# Patient Record
Sex: MALE | Race: BLACK OR AFRICAN AMERICAN | Employment: OTHER | URBAN - METROPOLITAN AREA
[De-identification: names, ages, dates, MRNs, and addresses within clinical notes are randomized per-mention and may not be internally consistent; named-entity substitution may affect disease eponyms.]

---

## 2019-02-28 ENCOUNTER — HOSPITAL ENCOUNTER (OUTPATIENT)
Age: 64
Discharge: HOME | End: 2019-03-02
Payer: MEDICARE

## 2019-02-28 ENCOUNTER — OFFICE VISIT (OUTPATIENT)
Dept: FAMILY MEDICINE CLINIC | Age: 64
End: 2019-02-28
Payer: MEDICARE

## 2019-02-28 VITALS
DIASTOLIC BLOOD PRESSURE: 82 MMHG | SYSTOLIC BLOOD PRESSURE: 118 MMHG | HEIGHT: 69 IN | OXYGEN SATURATION: 98 % | HEART RATE: 82 BPM | WEIGHT: 207.6 LBS | BODY MASS INDEX: 30.75 KG/M2

## 2019-02-28 DIAGNOSIS — Z12.11 COLON CANCER SCREENING: ICD-10-CM

## 2019-02-28 DIAGNOSIS — M19.011 ARTHRITIS OF RIGHT ACROMIOCLAVICULAR JOINT: ICD-10-CM

## 2019-02-28 DIAGNOSIS — J30.89 ALLERGIC RHINITIS DUE TO OTHER ALLERGIC TRIGGER, UNSPECIFIED SEASONALITY: ICD-10-CM

## 2019-02-28 DIAGNOSIS — M54.50 LUMBAR PAIN: ICD-10-CM

## 2019-02-28 DIAGNOSIS — M19.041 OSTEOARTHRITIS OF FINGER OF RIGHT HAND: Primary | ICD-10-CM

## 2019-02-28 DIAGNOSIS — Z79.899 ENCOUNTER FOR MEDICATION MANAGEMENT: ICD-10-CM

## 2019-02-28 DIAGNOSIS — M19.041 OSTEOARTHRITIS OF FINGER OF RIGHT HAND: ICD-10-CM

## 2019-02-28 DIAGNOSIS — F14.11 HISTORY OF COCAINE ABUSE (HCC): ICD-10-CM

## 2019-02-28 LAB
ALBUMIN SERPL-MCNC: 4 G/DL (ref 3.5–5.2)
ALP BLD-CCNC: 64 U/L (ref 40–129)
ALT SERPL-CCNC: 19 U/L (ref 0–40)
ANION GAP SERPL CALCULATED.3IONS-SCNC: 14 MMOL/L (ref 7–16)
AST SERPL-CCNC: 22 U/L (ref 0–39)
BILIRUB SERPL-MCNC: 0.2 MG/DL (ref 0–1.2)
BUN BLDV-MCNC: 16 MG/DL (ref 8–23)
CALCIUM SERPL-MCNC: 9.2 MG/DL (ref 8.6–10.2)
CHLORIDE BLD-SCNC: 101 MMOL/L (ref 98–107)
CO2: 26 MMOL/L (ref 22–29)
CREAT SERPL-MCNC: 0.8 MG/DL (ref 0.7–1.2)
GFR AFRICAN AMERICAN: >60
GFR NON-AFRICAN AMERICAN: >60 ML/MIN/1.73
GLUCOSE BLD-MCNC: 77 MG/DL (ref 74–99)
HCT VFR BLD CALC: 40.1 % (ref 37–54)
HEMOGLOBIN: 13 G/DL (ref 12.5–16.5)
MCH RBC QN AUTO: 30 PG (ref 26–35)
MCHC RBC AUTO-ENTMCNC: 32.4 % (ref 32–34.5)
MCV RBC AUTO: 92.6 FL (ref 80–99.9)
PDW BLD-RTO: 13.4 FL (ref 11.5–15)
PLATELET # BLD: 319 E9/L (ref 130–450)
PMV BLD AUTO: 11.5 FL (ref 7–12)
POTASSIUM SERPL-SCNC: 4.1 MMOL/L (ref 3.5–5)
RBC # BLD: 4.33 E12/L (ref 3.8–5.8)
SODIUM BLD-SCNC: 141 MMOL/L (ref 132–146)
TOTAL PROTEIN: 6.9 G/DL (ref 6.4–8.3)
WBC # BLD: 6.4 E9/L (ref 4.5–11.5)

## 2019-02-28 PROCEDURE — 99203 OFFICE O/P NEW LOW 30 MIN: CPT | Performed by: PHYSICIAN ASSISTANT

## 2019-02-28 PROCEDURE — 85027 COMPLETE CBC AUTOMATED: CPT

## 2019-02-28 PROCEDURE — 81003 URINALYSIS AUTO W/O SCOPE: CPT | Performed by: PHYSICIAN ASSISTANT

## 2019-02-28 PROCEDURE — 80053 COMPREHEN METABOLIC PANEL: CPT

## 2019-02-28 RX ORDER — CYCLOBENZAPRINE HCL 5 MG
5 TABLET ORAL 2 TIMES DAILY PRN
Qty: 30 TABLET | Refills: 0 | Status: SHIPPED | OUTPATIENT
Start: 2019-02-28 | End: 2019-03-10

## 2019-02-28 RX ORDER — FLUTICASONE PROPIONATE 50 MCG
1 SPRAY, SUSPENSION (ML) NASAL DAILY
Qty: 1 BOTTLE | Refills: 3 | Status: SHIPPED | OUTPATIENT
Start: 2019-02-28 | End: 2019-02-28 | Stop reason: SDUPTHER

## 2019-02-28 RX ORDER — LORATADINE 10 MG/1
10 TABLET ORAL DAILY
Qty: 30 TABLET | Refills: 3 | Status: SHIPPED | OUTPATIENT
Start: 2019-02-28 | End: 2019-02-28 | Stop reason: SDUPTHER

## 2019-02-28 RX ORDER — LORATADINE 10 MG/1
10 TABLET ORAL DAILY
Qty: 30 TABLET | Refills: 3 | Status: SHIPPED | OUTPATIENT
Start: 2019-02-28 | End: 2019-07-31

## 2019-02-28 RX ORDER — FLUTICASONE PROPIONATE 50 MCG
1 SPRAY, SUSPENSION (ML) NASAL DAILY
Qty: 1 BOTTLE | Refills: 3 | Status: SHIPPED | OUTPATIENT
Start: 2019-02-28 | End: 2019-07-31 | Stop reason: ALTCHOICE

## 2019-02-28 ASSESSMENT — PATIENT HEALTH QUESTIONNAIRE - PHQ9
SUM OF ALL RESPONSES TO PHQ QUESTIONS 1-9: 0
2. FEELING DOWN, DEPRESSED OR HOPELESS: 0
SUM OF ALL RESPONSES TO PHQ QUESTIONS 1-9: 0
SUM OF ALL RESPONSES TO PHQ9 QUESTIONS 1 & 2: 0
1. LITTLE INTEREST OR PLEASURE IN DOING THINGS: 0

## 2019-03-01 ENCOUNTER — HOSPITAL ENCOUNTER (OUTPATIENT)
Age: 64
Discharge: HOME | End: 2019-03-03
Payer: MEDICARE

## 2019-03-01 DIAGNOSIS — Z79.899 ENCOUNTER FOR MEDICATION MANAGEMENT: ICD-10-CM

## 2019-03-01 LAB
AMPHETAMINE SCREEN, URINE: NOT DETECTED
BARBITURATE SCREEN URINE: NOT DETECTED
BENZODIAZEPINE SCREEN, URINE: NOT DETECTED
BILIRUBIN, POC: NEGATIVE
BLOOD URINE, POC: NEGATIVE
CANNABINOID SCREEN URINE: NOT DETECTED
CLARITY, POC: CLEAR
COCAINE METABOLITE SCREEN URINE: NOT DETECTED
COLOR, POC: YELLOW
GLUCOSE URINE, POC: NEGATIVE
KETONES, POC: NEGATIVE
LEUKOCYTE EST, POC: NEGATIVE
METHADONE SCREEN, URINE: NOT DETECTED
NITRITE, POC: NEGATIVE
OPIATE SCREEN URINE: NOT DETECTED
PH, POC: 6.5
PHENCYCLIDINE SCREEN URINE: NOT DETECTED
PROPOXYPHENE SCREEN: NOT DETECTED
PROTEIN, POC: NEGATIVE
SPECIFIC GRAVITY, POC: 1.02
UROBILINOGEN, POC: NORMAL

## 2019-03-01 PROCEDURE — 80307 DRUG TEST PRSMV CHEM ANLYZR: CPT

## 2019-03-04 ENCOUNTER — TELEPHONE (OUTPATIENT)
Dept: FAMILY MEDICINE CLINIC | Age: 64
End: 2019-03-04

## 2019-03-04 ENCOUNTER — HOSPITAL ENCOUNTER (OUTPATIENT)
Age: 64
Discharge: HOME | End: 2019-03-06
Payer: MEDICARE

## 2019-03-04 ENCOUNTER — HOSPITAL ENCOUNTER (OUTPATIENT)
Dept: GENERAL RADIOLOGY | Age: 64
Discharge: HOME | End: 2019-03-06
Payer: MEDICARE

## 2019-03-04 DIAGNOSIS — M54.50 LUMBAR PAIN: ICD-10-CM

## 2019-03-04 DIAGNOSIS — Z12.11 COLON CANCER SCREENING: ICD-10-CM

## 2019-03-04 LAB
CONTROL: PRESENT
HEMOCCULT STL QL: POSITIVE

## 2019-03-04 PROCEDURE — 82274 ASSAY TEST FOR BLOOD FECAL: CPT | Performed by: PHYSICIAN ASSISTANT

## 2019-03-04 PROCEDURE — 72110 X-RAY EXAM L-2 SPINE 4/>VWS: CPT

## 2019-03-05 DIAGNOSIS — M19.041 OSTEOARTHRITIS OF FINGER OF RIGHT HAND: Primary | ICD-10-CM

## 2019-03-05 DIAGNOSIS — R19.5 POSITIVE FIT (FECAL IMMUNOCHEMICAL TEST): Primary | ICD-10-CM

## 2019-03-06 ENCOUNTER — HOSPITAL ENCOUNTER (OUTPATIENT)
Dept: GENERAL RADIOLOGY | Age: 64
Discharge: HOME | End: 2019-03-08
Payer: MEDICARE

## 2019-03-06 ENCOUNTER — HOSPITAL ENCOUNTER (OUTPATIENT)
Age: 64
Discharge: HOME | End: 2019-03-08
Payer: MEDICARE

## 2019-03-06 ENCOUNTER — TELEPHONE (OUTPATIENT)
Dept: FAMILY MEDICINE CLINIC | Age: 64
End: 2019-03-06

## 2019-03-06 DIAGNOSIS — M19.041 OSTEOARTHRITIS OF FINGER OF RIGHT HAND: ICD-10-CM

## 2019-03-06 PROCEDURE — 73130 X-RAY EXAM OF HAND: CPT

## 2019-03-13 ENCOUNTER — OFFICE VISIT (OUTPATIENT)
Dept: FAMILY MEDICINE CLINIC | Age: 64
End: 2019-03-13
Payer: MEDICARE

## 2019-03-13 VITALS
HEART RATE: 89 BPM | DIASTOLIC BLOOD PRESSURE: 72 MMHG | HEIGHT: 69 IN | WEIGHT: 206.4 LBS | SYSTOLIC BLOOD PRESSURE: 118 MMHG | OXYGEN SATURATION: 97 % | BODY MASS INDEX: 30.57 KG/M2

## 2019-03-13 DIAGNOSIS — M54.50 LUMBAR PAIN: ICD-10-CM

## 2019-03-13 DIAGNOSIS — G89.29 CHRONIC RIGHT SHOULDER PAIN: ICD-10-CM

## 2019-03-13 DIAGNOSIS — R05.9 COUGH: ICD-10-CM

## 2019-03-13 DIAGNOSIS — M25.511 CHRONIC RIGHT SHOULDER PAIN: ICD-10-CM

## 2019-03-13 DIAGNOSIS — M19.041 OSTEOARTHRITIS OF FINGER OF RIGHT HAND: ICD-10-CM

## 2019-03-13 DIAGNOSIS — J32.0 CHRONIC MAXILLARY SINUSITIS: Primary | ICD-10-CM

## 2019-03-13 DIAGNOSIS — Z13.220 LIPID SCREENING: ICD-10-CM

## 2019-03-13 DIAGNOSIS — H66.92 LEFT OTITIS MEDIA, UNSPECIFIED OTITIS MEDIA TYPE: ICD-10-CM

## 2019-03-13 PROCEDURE — G8417 CALC BMI ABV UP PARAM F/U: HCPCS | Performed by: NURSE PRACTITIONER

## 2019-03-13 PROCEDURE — 4004F PT TOBACCO SCREEN RCVD TLK: CPT | Performed by: NURSE PRACTITIONER

## 2019-03-13 PROCEDURE — 3017F COLORECTAL CA SCREEN DOC REV: CPT | Performed by: NURSE PRACTITIONER

## 2019-03-13 PROCEDURE — 99214 OFFICE O/P EST MOD 30 MIN: CPT | Performed by: NURSE PRACTITIONER

## 2019-03-13 PROCEDURE — G8484 FLU IMMUNIZE NO ADMIN: HCPCS | Performed by: NURSE PRACTITIONER

## 2019-03-13 PROCEDURE — G8427 DOCREV CUR MEDS BY ELIG CLIN: HCPCS | Performed by: NURSE PRACTITIONER

## 2019-03-13 RX ORDER — DEXTROMETHORPHAN POLISTIREX 30 MG/5ML
60 SUSPENSION ORAL 2 TIMES DAILY PRN
Qty: 148 ML | Refills: 0 | Status: SHIPPED | OUTPATIENT
Start: 2019-03-13 | End: 2019-03-23

## 2019-03-13 RX ORDER — CEFDINIR 300 MG/1
300 CAPSULE ORAL 2 TIMES DAILY
Qty: 14 CAPSULE | Refills: 0 | Status: SHIPPED | OUTPATIENT
Start: 2019-03-13 | End: 2019-03-20

## 2019-03-13 ASSESSMENT — ENCOUNTER SYMPTOMS
SHORTNESS OF BREATH: 0
VOMITING: 0
SINUS PRESSURE: 0
CONSTIPATION: 0
BACK PAIN: 1
WHEEZING: 0
NAUSEA: 0
DIARRHEA: 0
SINUS PAIN: 0
COUGH: 1

## 2019-03-14 ENCOUNTER — OFFICE VISIT (OUTPATIENT)
Dept: SURGERY | Age: 64
End: 2019-03-14
Payer: MEDICARE

## 2019-03-14 ENCOUNTER — TELEPHONE (OUTPATIENT)
Dept: FAMILY MEDICINE CLINIC | Age: 64
End: 2019-03-14

## 2019-03-14 ENCOUNTER — TELEPHONE (OUTPATIENT)
Dept: SURGERY | Age: 64
End: 2019-03-14

## 2019-03-14 VITALS
TEMPERATURE: 98 F | OXYGEN SATURATION: 98 % | SYSTOLIC BLOOD PRESSURE: 100 MMHG | WEIGHT: 205 LBS | BODY MASS INDEX: 30.36 KG/M2 | DIASTOLIC BLOOD PRESSURE: 70 MMHG | RESPIRATION RATE: 18 BRPM | HEIGHT: 69 IN | HEART RATE: 87 BPM

## 2019-03-14 DIAGNOSIS — M54.50 LUMBAR PAIN: Primary | ICD-10-CM

## 2019-03-14 DIAGNOSIS — G89.29 CHRONIC RIGHT SHOULDER PAIN: ICD-10-CM

## 2019-03-14 DIAGNOSIS — R19.5 POSITIVE FIT (FECAL IMMUNOCHEMICAL TEST): Primary | ICD-10-CM

## 2019-03-14 DIAGNOSIS — M19.041 OSTEOARTHRITIS OF FINGER OF RIGHT HAND: ICD-10-CM

## 2019-03-14 DIAGNOSIS — M25.511 CHRONIC RIGHT SHOULDER PAIN: ICD-10-CM

## 2019-03-14 PROCEDURE — 99203 OFFICE O/P NEW LOW 30 MIN: CPT | Performed by: SURGERY

## 2019-03-21 ENCOUNTER — OFFICE VISIT (OUTPATIENT)
Dept: PAIN MANAGEMENT | Age: 64
End: 2019-03-21
Payer: MEDICARE

## 2019-03-21 ENCOUNTER — HOSPITAL ENCOUNTER (OUTPATIENT)
Age: 64
Discharge: HOME | End: 2019-03-23
Payer: MEDICARE

## 2019-03-21 VITALS
DIASTOLIC BLOOD PRESSURE: 74 MMHG | TEMPERATURE: 98.6 F | WEIGHT: 206 LBS | SYSTOLIC BLOOD PRESSURE: 120 MMHG | RESPIRATION RATE: 16 BRPM | HEIGHT: 69 IN | HEART RATE: 88 BPM | OXYGEN SATURATION: 97 % | BODY MASS INDEX: 30.51 KG/M2

## 2019-03-21 DIAGNOSIS — M25.561 CHRONIC PAIN OF RIGHT KNEE: ICD-10-CM

## 2019-03-21 DIAGNOSIS — G89.4 CHRONIC PAIN SYNDROME: Primary | ICD-10-CM

## 2019-03-21 DIAGNOSIS — G89.29 CHRONIC PAIN OF RIGHT KNEE: ICD-10-CM

## 2019-03-21 DIAGNOSIS — M54.41 CHRONIC RIGHT-SIDED LOW BACK PAIN WITH RIGHT-SIDED SCIATICA: ICD-10-CM

## 2019-03-21 DIAGNOSIS — G89.29 CHRONIC RIGHT-SIDED LOW BACK PAIN WITH RIGHT-SIDED SCIATICA: ICD-10-CM

## 2019-03-21 DIAGNOSIS — M79.10 MYALGIA: ICD-10-CM

## 2019-03-21 DIAGNOSIS — M25.511 CHRONIC RIGHT SHOULDER PAIN: ICD-10-CM

## 2019-03-21 DIAGNOSIS — M79.641 RIGHT HAND PAIN: ICD-10-CM

## 2019-03-21 DIAGNOSIS — G89.29 CHRONIC RIGHT SHOULDER PAIN: ICD-10-CM

## 2019-03-21 LAB — SPECIFIC GRAVITY UA: 1.02 (ref 1–1.03)

## 2019-03-21 PROCEDURE — G0480 DRUG TEST DEF 1-7 CLASSES: HCPCS

## 2019-03-21 PROCEDURE — 99204 OFFICE O/P NEW MOD 45 MIN: CPT | Performed by: PAIN MEDICINE

## 2019-03-21 PROCEDURE — 4004F PT TOBACCO SCREEN RCVD TLK: CPT | Performed by: PAIN MEDICINE

## 2019-03-21 PROCEDURE — 80307 DRUG TEST PRSMV CHEM ANLYZR: CPT

## 2019-03-21 PROCEDURE — G8417 CALC BMI ABV UP PARAM F/U: HCPCS | Performed by: PAIN MEDICINE

## 2019-03-21 PROCEDURE — G8484 FLU IMMUNIZE NO ADMIN: HCPCS | Performed by: PAIN MEDICINE

## 2019-03-21 PROCEDURE — G8427 DOCREV CUR MEDS BY ELIG CLIN: HCPCS | Performed by: PAIN MEDICINE

## 2019-03-21 PROCEDURE — 3017F COLORECTAL CA SCREEN DOC REV: CPT | Performed by: PAIN MEDICINE

## 2019-03-25 LAB
6AM URINE: <10 NG/ML
7-AMINOCLONAZEPAM, URINE: <5 NG/ML
ALPHA-HYDROXYALPRAZOLAM, URINE: <5 NG/ML
ALPHA-HYDROXYMIDAZOLAM, URINE: <20 NG/ML
ALPRAZOLAM, URINE: <5 NG/ML
CHLORDIAZEPOXIDE, URINE: <20 NG/ML
CLONAZEPAM, URINE: <5 NG/ML
CODEINE, URINE: <20 NG/ML
DIAZEPAM, URINE: <20 NG/ML
HYDROCODONE, URINE: 774 NG/ML
HYDROMORPHONE, URINE: <20 NG/ML
LORAZEPAM, URINE: <20 NG/ML
MIDAZOLAM, URINE: <20 NG/ML
MORPHINE URINE: <20 NG/ML
NORDIAZEPAM, URINE: <20 NG/ML
NORHYDROCODONE, URINE: 923 NG/ML
NOROXYCODONE, URINE: <20 NG/ML
NOROXYMORPHONE, URINE: <20 NG/ML
OXAZEPAM, URINE: <20 NG/ML
OXYCODONE, URINE CONFIRMATION: <20 NG/ML
OXYMORPHONE, URINE: <20 NG/ML
TEMAZEPAM, URINE: <20 NG/ML

## 2019-03-26 ENCOUNTER — TELEPHONE (OUTPATIENT)
Dept: SURGERY | Age: 64
End: 2019-03-26

## 2019-03-26 LAB
Lab: NORMAL
REPORT: NORMAL
THIS TEST SENT TO: NORMAL

## 2019-03-27 ENCOUNTER — TELEPHONE (OUTPATIENT)
Dept: PAIN MANAGEMENT | Age: 64
End: 2019-03-27

## 2019-03-28 ENCOUNTER — HOSPITAL ENCOUNTER (OUTPATIENT)
Dept: PHYSICAL THERAPY | Age: 64
Setting detail: THERAPIES SERIES
Discharge: HOME | End: 2019-03-28
Payer: MEDICARE

## 2019-03-28 PROCEDURE — 97161 PT EVAL LOW COMPLEX 20 MIN: CPT | Performed by: PHYSICAL THERAPIST

## 2019-03-28 ASSESSMENT — PAIN DESCRIPTION - PAIN TYPE: TYPE: CHRONIC PAIN

## 2019-03-28 ASSESSMENT — PAIN SCALES - GENERAL: PAINLEVEL_OUTOF10: 7

## 2019-03-28 ASSESSMENT — PAIN DESCRIPTION - LOCATION: LOCATION: BACK;LEG

## 2019-03-28 ASSESSMENT — PAIN - FUNCTIONAL ASSESSMENT: PAIN_FUNCTIONAL_ASSESSMENT: PREVENTS OR INTERFERES SOME ACTIVE ACTIVITIES AND ADLS

## 2019-03-28 ASSESSMENT — PAIN DESCRIPTION - DESCRIPTORS: DESCRIPTORS: ACHING;CONSTANT

## 2019-03-28 ASSESSMENT — PAIN DESCRIPTION - ORIENTATION: ORIENTATION: RIGHT;LEFT

## 2019-03-29 NOTE — PROGRESS NOTES
[facility] - BEHAVIORAL HEALTH SERVICES Pain Management        [address] [facility], [place]  Dept: [phone]        Follow up Note      Ravi Johnson     Date of Visit:  04/02/19     CC:  Patient presents for follow up   Chief Complaint   Patient presents with    Pain     rt. lower back would like to discuss injections       HPI:    Pain is unchanged. Change in quality of symptoms:no. Medication side effects:none. Recent diagnostic testing:none. Recent interventional procedures:none. He has been on anticoagulation medications to include NSAIDS and has not been on herbal supplements. He is not diabetic.     Imaging:   Rt hand xray 3/2019 -   Findings: There is no evidence of fracture or dislocation. Patient is status   post fusion of the third proximal interphalangeal joint which was seen   before and appears to be unchanged. There is osteopenia of the osseous   structure. There is no interval change from prior study. . The soft   tissues appear normal.      Lumbar xray 3/2019 -   Vertebral body height and alignment are unremarkable. There are small   posterior spurs at L4-5. There is moderate disc space narrowing small   posterior spurs at L5-S1 and moderate size anterior spurs. There is   some sclerosis about the left SI joint.       There is some facet arthrosis in the lower lumbar spine. Small amount aortic vascular plaque.         Potential Aberrant Drug-Related Behavior:  Yes, pt requests opioid pain medications, and reports using medications that belong to a family member    Urine Drug Screening:  3/2019 inconsistent + hydrocodone and metabolite which is not on OARRS    OARRS report:  4/2019 shows prometh cough syrup only    Past Medical History:   Diagnosis Date    Anxiety     Chronic right-sided low back pain with right-sided sciatica 3/21/2019       Past Surgical History:   Procedure Laterality Date    ARTHRODESIS      Right 3rd PIP joint    FINGER SURGERY      SHOULDER SURGERY      Right AC joint surgery       Prior to Admission medications    Medication Sig Start Date End Date Taking? Authorizing Provider   Aspirin-Acetaminophen-Caffeine (EXCEDRIN PO) Take by mouth Q8 hrs. Yes Historical Provider, MD   diclofenac sodium (VOLTAREN) 1 % GEL Apply 4 g topically 4 times daily as needed for Pain 3/21/19 4/20/19 Yes Tamiko Branch,    diclofenac (VOLTAREN) 50 MG EC tablet Take 1 tablet by mouth 3 times daily (with meals) 2/28/19  Yes PAVAN Mcmillan   fluticasone Evorn Heck) 50 MCG/ACT nasal spray 1 spray by Nasal route daily 2/28/19  Yes PAVAN Mcmillan   loratadine (CLARITIN) 10 MG tablet Take 1 tablet by mouth daily 2/28/19  Yes PAVAN Mcmillan       Allergies   Allergen Reactions    Penicillins Other (See Comments)     Pt. Doesn't know reaction.         Social History     Socioeconomic History    Marital status:      Spouse name: Not on file    Number of children: Not on file    Years of education: Not on file    Highest education level: Not on file   Occupational History    Not on file   Social Needs    Financial resource strain: Not on file    Food insecurity:     Worry: Not on file     Inability: Not on file    Transportation needs:     Medical: Not on file     Non-medical: Not on file   Tobacco Use    Smoking status: Current Some Day Smoker     Packs/day: 0.50     Years: 35.00     Pack years: 17.50     Types: Cigarettes    Smokeless tobacco: Never Used   Substance and Sexual Activity    Alcohol use: No    Drug use: Not Currently     Types: Cocaine     Comment: pt denied     Sexual activity: Not on file   Lifestyle    Physical activity:     Days per week: Not on file     Minutes per session: Not on file    Stress: Not on file   Relationships    Social connections:     Talks on phone: Not on file     Gets together: Not on file     Attends Cheondoism service: Not on file     Active member of club or organization: Not on file     Attends meetings of clubs or organizations: Not on file     Relationship status: Not on file    Intimate partner violence:     Fear of current or ex partner: Not on file     Emotionally abused: Not on file     Physically abused: Not on file     Forced sexual activity: Not on file   Other Topics Concern    Not on file   Social History Narrative    Not on file       Family History   Problem Relation Age of Onset    Heart Disease Father         64    Stroke Mother        REVIEW OF SYSTEMS:     Luis Carlos denies fever/chills, chest pain, shortness of breath, new bowel or bladder complaints. All other review of systems was negative. PHYSICAL EXAMINATION:      /62   Pulse 74   Temp 98.7 °F (37.1 °C) (Oral)   Resp 18   Ht 5' 9\" (1.753 m)   Wt 206 lb (93.4 kg)   SpO2 100%   BMI 30.42 kg/m²     General:       General appearance:well-hydrated, in no distress and A & O x3  Build:Normal Weight  Function:Rises from a seated position easily.     HEENT:     Head:normocephalic, atraumatic  Pupils:regular, round, equal  Sclera: icterus absent     Lungs:     Breathing:normal breathing pattern     Abdomen:     Shape:non-distended and normal  Tenderness:none  Guarding:none     Lumbar spine:     Spine inspection:normal   CVA tenderness:No   Palpation:tenderness paravertebral muscles, left, right and positive.   Range of motion:abnormal mildly in lateral bending, flexion, extension rotation bilateral and is painful.     Musculoskeletal:     Trigger points in Paraveteral:absent bilaterally  SI joint tenderness:positive right, negative left              GREG test:negative right, not done             left  Piriformis tenderness:positive right, negative left  Trochanteric bursa tenderness:negative right, negative left  SLR:positive right, negative left, sitting      Extremities:     Tremors:None bilaterally upper and lower  Range of motion:Generally normal shoulders  Intact:Yes, post surgical changes rt shoulder and right third digit of hand  Varicose veins:absent Pulses:present Lt radial  Cyanosis:none  Edema:none x all 4 extremities     Knee:     Inspection:symmetric, swelling none bilaterally  Tenderness of Bony Landmarks:none, right  Tenderness of Bursa:none, right  Drawer Test:negative  Effusion:absent bilaterally  Crepitus:absent bilaterally  ROM:Left full  Right full      Neurological:     Sensory:normal to light touch x all 4 extremities     Motor:   Right Grip5/5              Left Grip5/5               Right Bicep5/5           Left Bicep5/5              Right Triceps5/5       Left Triceps5/5          Right Deltoid5/5     Left Deltoid5/5                  Right Quadriceps5/5          Left Quadriceps5/5           Right Gastrocnemius5/5    Left Gastrocnemius5/5  Right Ant Tibialis5/5  Left Ant Tibialis5/5     Gait:normal station     Assessment/Plan:  LBP radiating into the RLE in L5 distribution  Lumbar xray shows moderate DDD of L5-S1 and some sclerosis of left SIJ   Right shoulder pain s/p 2 surgeries on the [facility] joint  Rt third digit pain, s/p fusion surgery  Rt hand xray shows pt is s/p fusion of third PIP joint, osteopenia  [facility] pain mgmt in [facility], first practice was dismissed for non-compliant use per records of second practice, second practice patient had injections and was treated with one tramadol daily for a period of time, per records of second practice the patient's [place] equivalent of OARRS report was concerning as patient had high quantities as well as repeated prometh cough syrup scripts without an active pulmonary process and the physician stated diversion could not be ruled out  Pt states he prefers opioid pain medications vs any type of interventional treatment  On disability  Pt moved back to the [facility] from [facility] 4 weeks prior to consult  Pt states his local cousin gets treated with pain medications and has been giving him some pills he's been taking intermittently which have been helpful     Previously discussed with patient I would not be treating him with controlled substances  Offered injections as patient has received 60-80% relief x 3 months with previous LESI  Pt stated he prefers opioid pain medications over injections \"I have a thing with needles\", however, since the consult visit called in wondering why he was not scheduled for an injection  Discussed the above with patient today and he states he never told me he did not want the injections and that he is requesting an injection  Discussed given I do not have a lumbar MRI I would be able to do a caudal BETZY, when patient went to the check out area to schedule his injection he was upset that he would have to wait until next week for the injection  Overall, I have concerns that our treatment plan and our abilities as an office will ever match this patient's desires     Xray rt shoulder and rt knee - encouraged to have done  Urine screen and OARRS report reviewed  Initial UDS is inconsistent with OARRS and is + for hydrocodone and metabolites  Pt is not a candidate for chronic opioid therapy  Voltaren gel in place of diclofenac PO was utilized and pt states it is not as helfpul as the PO dosing  PT - encouraged to continue  TENS - pt declined  Caudal BETZY ordered - r/b discussed   Consider lumbar MRI once PT complete  Patient encouraged to stay active  Treatment plan discussed with the patient including medication and procedure side effects     Cc:  Referring physician    LIU Angel.

## 2019-04-01 ENCOUNTER — HOSPITAL ENCOUNTER (OUTPATIENT)
Dept: PHYSICAL THERAPY | Age: 64
Setting detail: THERAPIES SERIES
Discharge: HOME | End: 2019-04-01
Payer: MEDICARE

## 2019-04-01 PROCEDURE — 97110 THERAPEUTIC EXERCISES: CPT | Performed by: PHYSICAL THERAPIST

## 2019-04-01 PROCEDURE — G0283 ELEC STIM OTHER THAN WOUND: HCPCS | Performed by: PHYSICAL THERAPIST

## 2019-04-02 ENCOUNTER — PREP FOR PROCEDURE (OUTPATIENT)
Dept: PAIN MANAGEMENT | Age: 64
End: 2019-04-02

## 2019-04-02 ENCOUNTER — OFFICE VISIT (OUTPATIENT)
Dept: PAIN MANAGEMENT | Age: 64
End: 2019-04-02
Payer: MEDICARE

## 2019-04-02 VITALS
HEART RATE: 74 BPM | DIASTOLIC BLOOD PRESSURE: 62 MMHG | TEMPERATURE: 98.7 F | BODY MASS INDEX: 30.51 KG/M2 | SYSTOLIC BLOOD PRESSURE: 126 MMHG | HEIGHT: 69 IN | RESPIRATION RATE: 18 BRPM | WEIGHT: 206 LBS | OXYGEN SATURATION: 100 %

## 2019-04-02 DIAGNOSIS — G89.29 CHRONIC PAIN OF RIGHT KNEE: ICD-10-CM

## 2019-04-02 DIAGNOSIS — M79.10 MYALGIA: ICD-10-CM

## 2019-04-02 DIAGNOSIS — M79.641 RIGHT HAND PAIN: ICD-10-CM

## 2019-04-02 DIAGNOSIS — M54.16 LUMBAR RADICULOPATHY: ICD-10-CM

## 2019-04-02 DIAGNOSIS — G89.29 CHRONIC RIGHT SHOULDER PAIN: ICD-10-CM

## 2019-04-02 DIAGNOSIS — M25.511 CHRONIC RIGHT SHOULDER PAIN: ICD-10-CM

## 2019-04-02 DIAGNOSIS — G89.4 CHRONIC PAIN SYNDROME: Primary | ICD-10-CM

## 2019-04-02 DIAGNOSIS — M54.41 CHRONIC RIGHT-SIDED LOW BACK PAIN WITH RIGHT-SIDED SCIATICA: ICD-10-CM

## 2019-04-02 DIAGNOSIS — M25.561 CHRONIC PAIN OF RIGHT KNEE: ICD-10-CM

## 2019-04-02 DIAGNOSIS — G89.29 CHRONIC RIGHT-SIDED LOW BACK PAIN WITH RIGHT-SIDED SCIATICA: ICD-10-CM

## 2019-04-02 DIAGNOSIS — M54.16 LUMBAR RADICULOPATHY: Primary | ICD-10-CM

## 2019-04-02 PROCEDURE — 4004F PT TOBACCO SCREEN RCVD TLK: CPT | Performed by: PAIN MEDICINE

## 2019-04-02 PROCEDURE — 3017F COLORECTAL CA SCREEN DOC REV: CPT | Performed by: PAIN MEDICINE

## 2019-04-02 PROCEDURE — 99214 OFFICE O/P EST MOD 30 MIN: CPT | Performed by: PAIN MEDICINE

## 2019-04-02 PROCEDURE — G8427 DOCREV CUR MEDS BY ELIG CLIN: HCPCS | Performed by: PAIN MEDICINE

## 2019-04-02 PROCEDURE — G8417 CALC BMI ABV UP PARAM F/U: HCPCS | Performed by: PAIN MEDICINE

## 2019-04-02 NOTE — PROGRESS NOTES
Analia Cross presents to the [facility] on 4/2/2019. Luis Carlos is complaining of pain rt. Lower back The pain is constant. The pain is described as aching and stabbing. Pain is rated on his best day at a 8, on his worst day at a 10, and on average at a 8 on the VAS scale. He took his last dose of none    Any procedures since your last visit. Pacemaker or defibrilator: No managed by none. He has been on anticoagulation medications to include NSAIDS excedrin and is managed by self .       /62   Pulse 74   Temp 98.7 °F (37.1 °C) (Oral)   Resp 18   Ht 5' 9\" (1.753 m)   Wt 206 lb (93.4 kg)   SpO2 100%   BMI 30.42 kg/m²

## 2019-04-05 ENCOUNTER — HOSPITAL ENCOUNTER (OUTPATIENT)
Dept: PHYSICAL THERAPY | Age: 64
Setting detail: THERAPIES SERIES
Discharge: HOME | End: 2019-04-05
Payer: MEDICARE

## 2019-04-05 PROCEDURE — G0283 ELEC STIM OTHER THAN WOUND: HCPCS | Performed by: PHYSICAL THERAPIST

## 2019-04-05 PROCEDURE — 97110 THERAPEUTIC EXERCISES: CPT | Performed by: PHYSICAL THERAPIST

## 2019-04-05 NOTE — PROGRESS NOTES
[address] [facility]                Phone: [phone]   Fax: [phone]    Physical Therapy Daily Treatment Note  Date:  2019    Patient Name:  Hudson Mares    :  1955  MRN: [id number]    Evaluating therapist:  DEVONTE Mariscal                     (3/28/19)  Restrictions/Precautions:    Diagnosis:  chronic pain syndrome  Treatment Diagnosis:    Insurance/Certification information:  [employer]   Referring Physician:  Rachid Koo. Plan of care signed (Y/N):    Visit# / total visits:  3/6  Pain level: 5/10   Time In:  932  Time Out:  1032    Subjective:      Exercises:  Exercise/Equipment Resistance/Repetitions Other comments   StepOne  10 min            tball flex/rot  10x10s           rot st 3x20s    SKTC 3x20s    piriformis st 3x20s           pelvic tilts  15x3s              bridges  15x3s                                                                      Other Therapeutic Activities:      Home Exercise Program:  provided 19    Manual Treatments:      Modalities:  IFC/MH to LB x 15 min     Timed Code Treatment Minutes: Total Treatment Minutes:      Treatment/Activity Tolerance:  [] Patient tolerated treatment well [] Patient limited by fatique  [] Patient limited by pain  [] Patient limited by other medical complications  [] Other:     Prognosis: [] Good [] Fair  [] Poor    Patient Requires Follow-up: [] Yes  [] No    Plan:   [] Continue per plan of care [] Alter current plan (see comments)  [] Plan of care initiated [] Hold pending MD visit [] Discharge  Plan for Next Session:      See Weekly Progress Note: []  Yes  []  No  Next due:        Electronically signed by:   Natividad Osgood

## 2019-04-09 ENCOUNTER — HOSPITAL ENCOUNTER (OUTPATIENT)
Dept: PHYSICAL THERAPY | Age: 64
Setting detail: THERAPIES SERIES
Discharge: HOME | End: 2019-04-09
Payer: MEDICARE

## 2019-04-09 PROCEDURE — 97110 THERAPEUTIC EXERCISES: CPT | Performed by: PHYSICAL THERAPIST

## 2019-04-09 PROCEDURE — G0283 ELEC STIM OTHER THAN WOUND: HCPCS | Performed by: PHYSICAL THERAPIST

## 2019-04-09 NOTE — PROGRESS NOTES
[address] [facility]                Phone: [phone]   Fax: [phone]    Physical Therapy Daily Treatment Note  Date:  2019    Patient Name:  Yoel Mcclelland    :  1955  MRN: [id number]    Evaluating therapist:  DEVONTE Deluna                     (3/28/19)  Restrictions/Precautions:    Diagnosis:  chronic pain syndrome  Treatment Diagnosis:    Insurance/Certification information:  [place] [employer]   Referring Physician:  Nagi Santana. Plan of care signed (Y/N):    Visit# / total visits:    Pain level: 10   Time In:  9382  Time Out:  1032    Subjective:      Exercises:  Exercise/Equipment Resistance/Repetitions Other comments   StepOne  10 min            tball flex/rot  10x10s           rot st 3x20s    SKTC 3x20s    piriformis st 3x20s           pelvic tilts  15x3s              bridges  15x3s                                                                      Other Therapeutic Activities:      Home Exercise Program:  provided 19    Manual Treatments:      Modalities:  IFC/MH to LB x 15 min     Timed Code Treatment Minutes:       Total Treatment Minutes:      Treatment/Activity Tolerance:  [] Patient tolerated treatment well [] Patient limited by fatique  [] Patient limited by pain  [] Patient limited by other medical complications  [] Other:     Prognosis: [] Good [] Fair  [] Poor    Patient Requires Follow-up: [] Yes  [] No    Plan:   [] Continue per plan of care [] Alter current plan (see comments)  [] Plan of care initiated [] Hold pending MD visit [] Discharge  Plan for Next Session:      See Weekly Progress Note: []  Yes  []  No  Next due:        Electronically signed by:  Mark Rao

## 2019-04-09 NOTE — PROGRESS NOTES
[facility]  INSTRUCTIONS    . ..........................................................................................................................................       ? Parking the day of Surgery is located in the [facility]. Upon entering the door, make an immediate right to the surgery reception desk    ? Bring photo ID and insurance card     ? You may have a light breakfast day of procedure    ? Wear loose comfortable clothing    ? Please follow instructions for medications as given per Dr's office     ? Stop blood thinners as per Dr's office instructions    ? You can expect a call the business day prior to procedure to notify you if your arrival time changes    ? Please arrange for     ?   Other instructions

## 2019-04-11 ENCOUNTER — HOSPITAL ENCOUNTER (OUTPATIENT)
Dept: GENERAL RADIOLOGY | Age: 64
Discharge: HOME | End: 2019-04-13
Attending: PAIN MEDICINE
Payer: MEDICARE

## 2019-04-11 ENCOUNTER — HOSPITAL ENCOUNTER (OUTPATIENT)
Age: 64
Setting detail: OUTPATIENT SURGERY
Discharge: HOME | End: 2019-04-11
Attending: PAIN MEDICINE | Admitting: PAIN MEDICINE
Payer: MEDICARE

## 2019-04-11 VITALS
HEIGHT: 69 IN | BODY MASS INDEX: 30.07 KG/M2 | DIASTOLIC BLOOD PRESSURE: 59 MMHG | OXYGEN SATURATION: 98 % | WEIGHT: 203 LBS | HEART RATE: 69 BPM | TEMPERATURE: 98 F | SYSTOLIC BLOOD PRESSURE: 116 MMHG | RESPIRATION RATE: 16 BRPM

## 2019-04-11 DIAGNOSIS — R52 PAIN MANAGEMENT: ICD-10-CM

## 2019-04-11 PROCEDURE — 62323 NJX INTERLAMINAR LMBR/SAC: CPT | Performed by: PAIN MEDICINE

## 2019-04-11 PROCEDURE — 3209999900 FLUORO FOR SURGICAL PROCEDURES

## 2019-04-11 PROCEDURE — 6360000004 HC RX CONTRAST MEDICATION: Performed by: PAIN MEDICINE

## 2019-04-11 PROCEDURE — 7100000010 HC PHASE II RECOVERY - FIRST 15 MIN: Performed by: PAIN MEDICINE

## 2019-04-11 PROCEDURE — 7100000011 HC PHASE II RECOVERY - ADDTL 15 MIN: Performed by: PAIN MEDICINE

## 2019-04-11 PROCEDURE — 6360000002 HC RX W HCPCS: Performed by: PAIN MEDICINE

## 2019-04-11 PROCEDURE — 3600000002 HC SURGERY LEVEL 2 BASE: Performed by: PAIN MEDICINE

## 2019-04-11 PROCEDURE — 2500000003 HC RX 250 WO HCPCS: Performed by: PAIN MEDICINE

## 2019-04-11 PROCEDURE — 2709999900 HC NON-CHARGEABLE SUPPLY: Performed by: PAIN MEDICINE

## 2019-04-11 RX ORDER — METHYLPREDNISOLONE ACETATE 40 MG/ML
INJECTION, SUSPENSION INTRA-ARTICULAR; INTRALESIONAL; INTRAMUSCULAR; SOFT TISSUE PRN
Status: DISCONTINUED | OUTPATIENT
Start: 2019-04-11 | End: 2019-04-11 | Stop reason: ALTCHOICE

## 2019-04-11 RX ORDER — LIDOCAINE HYDROCHLORIDE 5 MG/ML
INJECTION, SOLUTION INFILTRATION; INTRAVENOUS PRN
Status: DISCONTINUED | OUTPATIENT
Start: 2019-04-11 | End: 2019-04-11 | Stop reason: ALTCHOICE

## 2019-04-11 ASSESSMENT — PAIN - FUNCTIONAL ASSESSMENT: PAIN_FUNCTIONAL_ASSESSMENT: 0-10

## 2019-04-11 ASSESSMENT — PAIN SCALES - GENERAL: PAINLEVEL_OUTOF10: 0

## 2019-04-11 NOTE — OP NOTE
2019    Patient: Shantanu Brown  :  1955  Age:  61 y.o. Sex:  male     PRE-OPERATIVE DIAGNOSIS: Displacement of lumbar intervertebral disc, Lumbar DDD, Spinal stenosis. POST-OPERATIVE DIAGNOSIS: Same. PROCEDURE PERFORMED:  #1 Therapeutic Caudal Epidural done under fluoroscopic guidance aborted and L5-S1 epidural steroid injection completed. SURGEON:   LIU Fenton. ANESTHESIA: local    ESTIMATED BLOOD LOSS: None. BRIEF HISTORY: Shantanu Brown comes in today for the first  therapeutic caudal epidural steroid injection under fluorsoscopic guidance. After discussing the potential risks and benefits of the procedure with the patient  Luis Carlos did request that we proceed. A complete History & Physical was reviewed and it is unchanged. DESCRIPTION OF PROCEDURE:    After confirming written and informed consent, a time-out was performed and Kit name and date of birth, the procedure to be performed as well as the plan for the location of the needle insertion were confirmed. Patient was brought into the procedure room and was placed in the prone position on a fluoroscopy table. Standard monitors were placed and vital signs were observed throughout the procedure. The lumbosacral and caudal area were prepped with chloraprep and draped in a sterile manner. The sacral hiatus was identified and marked under AP fluoroscopy. A sterile gauze was placed in the midgluteal cleft for increased sterility. The overlying skin and subcutaneous tissues were anesthetized with 0.5% Lidocaine. Under lateral fluoroscopic guidance, a # 22 gauge 3.5 inch spinal needle was advanced into the sacral hiatus . Multiple attempts were undertaken to enter the caudal epidural space but these attempts were unsuccessful. Thus, the procedure was aborted and a L5-S1 epidural steroid injection was completed (see procedure note below).     Rashaad Oliver DO     2019    Patient: Shantanu Brown  :  1955  Age:  61 placed. Negative aspiration of blood and CSF was confirmed. Two ml of Isovue-M 200 was used for confirmation of even epidural spread under both live lateral and live AP fluoroscopy. After negative aspiration, a solution of 0.5 % Lidocaine 3 ml and 40 mg DepoMedrol was easily injected. The needle was gently removed intact . The patient's back was cleaned and a Band-Aid was placed over the needle insertion point. Disposition the patient tolerated the procedure well and there were no complications . Vital signs remained stable throughout the procedure. The patient was escorted to the recovery area where they remained until discharge and written discharge instructions for the procedure were given. Plan: Manuel Dewitt will return to our pain management center as scheduled.      Tanna Hawk DO

## 2019-04-11 NOTE — H&P
[facility] - BEHAVIORAL HEALTH SERVICES Pain Management        [address] [facility] [facility], [address] [facility]  Dept: [phone]    Procedure History & Physical      Anil Covert     HPI:    Patient  is here for LBP RLE pain for caudal BETZY  Labs/imaging studies reviewed   All question and concerns addressed including R/B/A associated with the procedure    Past Medical History:   Diagnosis Date    Anxiety     Chronic right-sided low back pain with right-sided sciatica 3/21/2019    Right hand pain        Past Surgical History:   Procedure Laterality Date    ARTHRODESIS      Right 3rd PIP joint    FINGER SURGERY      fusion    SHOULDER SURGERY      Right AC joint surgery       Prior to Admission medications    Medication Sig Start Date End Date Taking? Authorizing Provider   Aspirin-Acetaminophen-Caffeine (EXCEDRIN PO) Take by mouth Q8 hrs. Yes Historical Provider, MD   diclofenac sodium (VOLTAREN) 1 % GEL Apply 4 g topically 4 times daily as needed for Pain 3/21/19 4/20/19 Yes Mann Gómez,    diclofenac (VOLTAREN) 50 MG EC tablet Take 1 tablet by mouth 3 times daily (with meals) 2/28/19  Yes PAVAN Rosales   fluticasone [facility]) 50 MCG/ACT nasal spray 1 spray by Nasal route daily 2/28/19  Yes PAVAN Rosales   loratadine (CLARITIN) 10 MG tablet Take 1 tablet by mouth daily 2/28/19  Yes PAVAN Rosales       Allergies   Allergen Reactions    Penicillins Other (See Comments)     Pt. Doesn't know reaction.         Social History     Socioeconomic History    Marital status:      Spouse name: Not on file    Number of children: Not on file    Years of education: Not on file    Highest education level: Not on file   Occupational History    Not on file   Social Needs    Financial resource strain: Not on file    Food insecurity:     Worry: Not on file     Inability: Not on file    Transportation needs:     Medical: Not on file     Non-medical: Not on file   Tobacco Use    Smoking status: Current Some Day Smoker Packs/day: 0.50     Years: 35.00     Pack years: 17.50     Types: Cigarettes    Smokeless tobacco: Never Used   Substance and Sexual Activity    Alcohol use: No    Drug use: Not Currently     Types: Cocaine    Sexual activity: Not on file   Lifestyle    Physical activity:     Days per week: Not on file     Minutes per session: Not on file    Stress: Not on file   Relationships    Social connections:     Talks on phone: Not on file     Gets together: Not on file     Attends Gnosticism service: Not on file     Active member of club or organization: Not on file     Attends meetings of clubs or organizations: Not on file     Relationship status: Not on file    Intimate partner violence:     Fear of current or ex partner: Not on file     Emotionally abused: Not on file     Physically abused: Not on file     Forced sexual activity: Not on file   Other Topics Concern    Not on file   Social History Narrative    Not on file       Family History   Problem Relation Age of Onset    Heart Disease Father         64    Stroke Mother          REVIEW OF SYSTEMS:    CONSTITUTIONAL:  negative for  fevers, chills, sweats and fatigue    RESPIRATORY:  negative for  dry cough, cough with sputum, dyspnea, wheezing and chest pain    CARDIOVASCULAR:  negative for chest pain, dyspnea, palpitations, syncope    GASTROINTESTINAL:  negative for nausea, vomiting, change in bowel habits, diarrhea, constipation and abdominal pain    MUSCULOSKELETAL: negative for muscle weakness    SKIN: negative for itching or rashes.     BEHAVIOR/PSYCH:  negative for poor appetite, increased appetite, decreased sleep and poor concentration    All other systems negative      PHYSICAL EXAM:    VITALS:  BP (!) 152/69   Pulse 74   Temp 98 °F (36.7 °C) (Temporal)   Resp 20   Ht 5' 9\" (1.753 m)   Wt 203 lb (92.1 kg)   SpO2 96%   BMI 29.98 kg/m²     CONSTITUTIONAL:  awake, alert, cooperative, no apparent distress, and appears stated age    EYES: PERRLA, EOMI    LUNGS:  No increased work of breathing, no audible wheezing    CARDIOVASCULAR:  regular rate and rhythm    ABDOMEN:  Soft non tender non distended     EXTREMITIES: no signs of clubbing or cyanosis. MUSCULOSKELETAL: negative for flaccid muscle tone or spastic movements. SKIN: gross examination reveals no signs of rashes, or diaphoresis. NEURO: Cranial nerves II-XII grossly intact. No signs of agitated mood.        Assessment/Plan:    LBP RLE pain for caudal BETZY

## 2019-04-12 ENCOUNTER — HOSPITAL ENCOUNTER (OUTPATIENT)
Dept: PHYSICAL THERAPY | Age: 64
Setting detail: THERAPIES SERIES
Discharge: HOME | End: 2019-04-12
Payer: MEDICARE

## 2019-04-12 PROCEDURE — 97110 THERAPEUTIC EXERCISES: CPT | Performed by: PHYSICAL THERAPIST

## 2019-04-12 PROCEDURE — G0283 ELEC STIM OTHER THAN WOUND: HCPCS | Performed by: PHYSICAL THERAPIST

## 2019-04-12 NOTE — PROGRESS NOTES
S:  pt presents to therapy for two of two scheduled visits this week; at week's end he reports that his back feels a little better; had first epidural yesterday and is a little sore at injection site; pain level down to 4/10 and seems less limiting to him; continues to have issue with occassional spasms across trunk; sleep is better; HEP going well per pt    O:  performed the exercises/treatments as written in the flowsheet for the week ending 4/12/19; intiated HEP for home management of condition; LB AROM grossly 85% WNL for all ranges; strength across B LE's grossly 5/5 for all ranges     A:  marita tx well; pt able to perform all requested tasks with good form and pacing noted; needed some verbal cuing at times calling faithful performance of HEP into question; LB AROM shows some overall improvement while B LE strength grossly stable since eval; movement does appear to be smoother and less guarded across trunk; gait stable with normal mechanics noted; endurance for all prolonged activities is GOOD-    P:  cont with POC of stretching/strengthening for LB/LE's with modalities as needed

## 2019-04-12 NOTE — PROGRESS NOTES
[facility]                Phone: [phone]   Fax: [phone]    Physical Therapy Daily Treatment Note  Date:  2019    Patient Name:  Gris Jha    :  1955  MRN: [id number]    Evaluating therapist:  DEVONTE Michaels                     (3/28/19)  Restrictions/Precautions:    Diagnosis:  chronic pain syndrome  Treatment Diagnosis:    Insurance/Certification information:  [employer]   Referring Physician:  Nicole Bagley. Plan of care signed (Y/N):    Visit# / total visits:    Pain level: 5/10   Time In:  912  Time Out:  954    Subjective:      Exercises:  Exercise/Equipment Resistance/Repetitions Other comments   StepOne  10 min            tball flex/rot  10x10s           rot st 3x20s    SKTC 3x20s    piriformis st 3x20s           pelvic tilts  15x3s              bridges  15x3s                                                                      Other Therapeutic Activities:      Home Exercise Program:  provided 19    Manual Treatments:      Modalities:  IFC/MH to LB x 15 min     Timed Code Treatment Minutes: Total Treatment Minutes:      Treatment/Activity Tolerance:  [] Patient tolerated treatment well [] Patient limited by fatique  [] Patient limited by pain  [] Patient limited by other medical complications  [] Other:     Prognosis: [] Good [] Fair  [] Poor    Patient Requires Follow-up: [] Yes  [] No    Plan:   [] Continue per plan of care [] Alter current plan (see comments)  [] Plan of care initiated [] Hold pending MD visit [] Discharge  Plan for Next Session:      See Weekly Progress Note: []  Yes  []  No  Next due:        Electronically signed by:   Jeanette Wing

## 2019-04-15 ENCOUNTER — HOSPITAL ENCOUNTER (OUTPATIENT)
Dept: PHYSICAL THERAPY | Age: 64
Setting detail: THERAPIES SERIES
Discharge: HOME | End: 2019-04-15
Payer: MEDICARE

## 2019-04-15 PROCEDURE — G0283 ELEC STIM OTHER THAN WOUND: HCPCS | Performed by: PHYSICAL THERAPIST

## 2019-04-15 PROCEDURE — 97110 THERAPEUTIC EXERCISES: CPT | Performed by: PHYSICAL THERAPIST

## 2019-04-15 NOTE — PROGRESS NOTES
[facility]                Phone: [phone]   Fax: [phone]    Physical Therapy Daily Treatment Note  Date:  4/15/2019    Patient Name:  Ravi Johnson    :  1955  MRN: [id number]    Evaluating therapist:  DEVONTE Persaud                     (3/28/19)  Restrictions/Precautions:    Diagnosis:  chronic pain syndrome  Treatment Diagnosis:    Insurance/Certification information:  [employer]   Referring Physician:  Kwaku Garay Plan of care signed (Y/N):    Visit# / total visits:    Pain level: 510   Time In:  946  Time Out:  1031    Subjective:      Exercises:  Exercise/Equipment Resistance/Repetitions Other comments   StepOne  10 min            tball flex/rot  10x10s           rot st 3x20s    SKTC 3x20s    piriformis st 3x20s           pelvic tilts  15x3s              bridges  15x3s                                                                      Other Therapeutic Activities:      Home Exercise Program:  provided 19    Manual Treatments:      Modalities:  IFC/MH to LB x 15 min     Timed Code Treatment Minutes: Total Treatment Minutes:      Treatment/Activity Tolerance:  [] Patient tolerated treatment well [] Patient limited by fatique  [] Patient limited by pain  [] Patient limited by other medical complications  [] Other:     Prognosis: [] Good [] Fair  [] Poor    Patient Requires Follow-up: [] Yes  [] No    Plan:   [] Continue per plan of care [] Alter current plan (see comments)  [] Plan of care initiated [] Hold pending MD visit [] Discharge  Plan for Next Session:      See Weekly Progress Note: []  Yes  []  No  Next due:        Electronically signed by:   Merle Harley

## 2019-05-02 ENCOUNTER — OFFICE VISIT (OUTPATIENT)
Dept: PAIN MANAGEMENT | Age: 64
End: 2019-05-02
Payer: MEDICARE

## 2019-05-02 VITALS
DIASTOLIC BLOOD PRESSURE: 72 MMHG | SYSTOLIC BLOOD PRESSURE: 118 MMHG | TEMPERATURE: 98.6 F | OXYGEN SATURATION: 94 % | HEIGHT: 69 IN | RESPIRATION RATE: 18 BRPM | WEIGHT: 203 LBS | HEART RATE: 84 BPM | BODY MASS INDEX: 30.07 KG/M2

## 2019-05-02 DIAGNOSIS — M51.36 DDD (DEGENERATIVE DISC DISEASE), LUMBAR: Primary | ICD-10-CM

## 2019-05-02 DIAGNOSIS — G89.4 CHRONIC PAIN SYNDROME: ICD-10-CM

## 2019-05-02 PROCEDURE — G8427 DOCREV CUR MEDS BY ELIG CLIN: HCPCS | Performed by: PHYSICIAN ASSISTANT

## 2019-05-02 PROCEDURE — 4004F PT TOBACCO SCREEN RCVD TLK: CPT | Performed by: PHYSICIAN ASSISTANT

## 2019-05-02 PROCEDURE — G8417 CALC BMI ABV UP PARAM F/U: HCPCS | Performed by: PHYSICIAN ASSISTANT

## 2019-05-02 PROCEDURE — 99213 OFFICE O/P EST LOW 20 MIN: CPT | Performed by: PHYSICIAN ASSISTANT

## 2019-05-02 PROCEDURE — 3017F COLORECTAL CA SCREEN DOC REV: CPT | Performed by: PHYSICIAN ASSISTANT

## 2019-05-02 NOTE — PROGRESS NOTES
[address]  [address], [address] [place] [address] Pedro  [phone]    Follow up Note      Anil Covert     Date of Visit:  5/2/2019    CC:  Patient presents for follow up   Chief Complaint   Patient presents with    Pain     lower back on the right side        HPI:    Pain is better. Injection still helping. Helping family member move heavy furniture recently. Right sided lower back pain. Overall, happy with his progress. Appropriate analgesia with current medications regimen: Doing well. Change in quality of symptoms:no. Medication side effects:not applicable . Recent diagnostic testing:none. Recent interventional procedures: 4/11/2019  PROCEDURE PERFORMED:  #1 Therapeutic Caudal Epidural done under fluoroscopic guidance aborted and L5-S1 epidural steroid injection completed. good. He has been on anticoagulation medications to include ASA and NSAIDs. The patient  has not been on herbal supplements. The patient is not diabetic. Imaging:   Rt hand xray 3/2019 -   Findings: There is no evidence of fracture or dislocation. Patient is status   post fusion of the third proximal interphalangeal joint which was seen   before and appears to be unchanged. There is osteopenia of the osseous   structure. There is no interval change from prior study. . The soft   tissues appear normal.      Lumbar xray 3/2019 -   Vertebral body height and alignment are unremarkable. There are small   posterior spurs at L4-5. There is moderate disc space narrowing small   posterior spurs at L5-S1 and moderate size anterior spurs. There is   some sclerosis about the left SI joint.       There is some facet arthrosis in the lower lumbar spine. Small amount aortic vascular plaque.                                          Potential Aberrant Drug-Related Behavior:  Yes, pt requests opioid pain medications, and reports using medications that belong to a family member     Urine Drug Screening:  3/2019 inconsistent + hydrocodone and metabolite which is not on OARRS     OARRS report:  4/2019 shows prometh cough syrup only       Past Medical History:   Diagnosis Date    Anxiety     Chronic right-sided low back pain with right-sided sciatica 3/21/2019    Right hand pain        Past Surgical History:   Procedure Laterality Date    ARTHRODESIS      Right 3rd PIP joint    EPIDURAL STEROID INJECTION Right 4/11/2019    L5 S1 EPIDURAL STEROID INJECTION performed by Allie Grove DO at [address] [facility]      fusion    SHOULDER SURGERY      Right AC joint surgery       Prior to Admission medications    Medication Sig Start Date End Date Taking? Authorizing Provider   Aspirin-Acetaminophen-Caffeine (EXCEDRIN PO) Take by mouth Q8 hrs. Yes Historical Provider, MD   diclofenac (VOLTAREN) 50 MG EC tablet Take 1 tablet by mouth 3 times daily (with meals) 2/28/19  Yes PAVAN Moralez   fluticasone [facility]) 50 MCG/ACT nasal spray 1 spray by Nasal route daily 2/28/19  Yes PAVAN Moralez   loratadine (CLARITIN) 10 MG tablet Take 1 tablet by mouth daily 2/28/19  Yes PAVAN Moralez   diclofenac sodium (VOLTAREN) 1 % GEL Apply 4 g topically 4 times daily as needed for Pain 3/21/19 4/20/19  Allie Grove DO       Allergies   Allergen Reactions    Penicillins Other (See Comments)     Pt. Doesn't know reaction.         Social History     Socioeconomic History    Marital status:      Spouse name: Not on file    Number of children: Not on file    Years of education: Not on file    Highest education level: Not on file   Occupational History    Not on file   Social Needs    Financial resource strain: Not on file    Food insecurity:     Worry: Not on file     Inability: Not on file    Transportation needs:     Medical: Not on file     Non-medical: Not on file   Tobacco Use    Smoking status: Current Some Day Smoker     Packs/day: 0.50     Years: 35.00     Pack years: 17.50     Types: Cigarettes    Smokeless tobacco: Never Used   Substance and Sexual Activity    Alcohol use: No    Drug use: Not Currently     Types: Cocaine    Sexual activity: Not on file   Lifestyle    Physical activity:     Days per week: Not on file     Minutes per session: Not on file    Stress: Not on file   Relationships    Social connections:     Talks on phone: Not on file     Gets together: Not on file     Attends Evangelical service: Not on file     Active member of club or organization: Not on file     Attends meetings of clubs or organizations: Not on file     Relationship status: Not on file    Intimate partner violence:     Fear of current or ex partner: Not on file     Emotionally abused: Not on file     Physically abused: Not on file     Forced sexual activity: Not on file   Other Topics Concern    Not on file   Social History Narrative    Not on file       Family History   Problem Relation Age of Onset    Heart Disease Father         64    Stroke Mother        REVIEW OF SYSTEMS:     Luis Carlos denies fever/chills, chest pain, shortness of breath, new bowel or bladder complaints. All other review of systems was negative. PHYSICAL EXAMINATION:      /72   Pulse 84   Temp 98.6 °F (37 °C)   Resp 18   Ht 5' 9\" (1.753 m)   Wt 203 lb (92.1 kg)   SpO2 94%   BMI 29.98 kg/m²     General:      General appearance:   pleasant and well-hydrated. , in no discomfort and A & O x3  Build:Overweight  Function:Moves about room without difficulty. HEENT:    Head:normocephalic and atraumatic  Pupils:regular, round and equal.  Sclera: icterus absent,    Lungs:    Breathing:Normal expansion. Clear to auscultation. No rales, rhonchi, or wheezing. Abdomen:    Shape:non-distended and normal  Tenderness:none  Guarding:none    Lumbar spine:    Spine inspection:normal   CVA tenderness:No   Palpation:tenderness paravertebral muscles, facet loading, left, right and negative.   Right lateral sided TTP - lower prior to consult  Pt states his local cousin gets treated with pain medications and has been giving him some pills he's been taking intermittently which have been helpful     Previously discussed with patient I would not be treating him with controlled substances  Offered injections as patient has received 60-80% relief x 3 months  with previous LESI               Patient is s/p:  PROCEDURE PERFORMED:  #1 Therapeutic                  Caudal Epidural done under fluoroscopic guidance aborted                  and L5-S1 epidural steroid injection completed with good                      relief  at this point. Patient pleased with current progress. Pt stated he prefers opioid pain medications over injections \"I have a thing with needles\", however, since the consult visit called in wondering why he was not scheduled for an injection  Discussed the above with patient today and he states he never told me he did not want the injections and that he is requesting an injection     Xray rt shoulder and rt knee - encouraged to have done  OARRS report reviewed  Initial UDS is inconsistent with OARRS and is + for hydrocodone and metabolites  Pt is not a candidate for chronic opioid therapy  Voltaren gel in place of diclofenac PO was utilized and pt states it is not as helfpul as the PO dosing  PT - encouraged to continue. Has been doing therapy for several weeks. Helping. TENS - Helping. Consider lumbar MRI once PT complete              Patient encouraged to stay active              Treatment plan discussed with the patient        Controlled Substances Monitoring:     RX Monitoring 5/2/2019   Attestation The Prescription Monitoring Report for this patient was reviewed today. Chronic Pain Routine Monitoring -                       Plan:    We discussed with the patient that combining opioids, benzodiazepines, alcohol, illicit drugs or sleep aids increases the risk of respiratory depression including death.  We discussed

## 2019-05-02 NOTE — PROGRESS NOTES
Wyatt Martinez presents to the [facility] on 5/2/2019. Luis Carlos is complaining of pain in the back right side  . The pain is constant have been helping his sister move, when he over does it has pain in the back . The pain is described as aching, throbbing, shooting and stabbing. Pain is rated on his best day at a 6, on his worst day at a 6, and on average at a 6 on the VAS scale. He took his last dose of otc excedrin  and therapy . Any procedures since your last visit: Yes, with 80 % relief. Pacemaker or defibrilator: No managed by none. He has been on anticoagulation medications to include ASA and is managed by on his own .       /72   Pulse 84   Temp 98.6 °F (37 °C)   Resp 18   Ht 5' 9\" (1.753 m)   Wt 203 lb (92.1 kg)   SpO2 94%   BMI 29.98 kg/m²

## 2019-05-06 ENCOUNTER — HOSPITAL ENCOUNTER (OUTPATIENT)
Dept: PHYSICAL THERAPY | Age: 64
Setting detail: THERAPIES SERIES
Discharge: HOME | End: 2019-05-06
Payer: MEDICARE

## 2019-05-06 PROCEDURE — G0283 ELEC STIM OTHER THAN WOUND: HCPCS | Performed by: PHYSICAL THERAPIST

## 2019-05-06 PROCEDURE — 97110 THERAPEUTIC EXERCISES: CPT | Performed by: PHYSICAL THERAPIST

## 2019-05-07 ENCOUNTER — PREP FOR PROCEDURE (OUTPATIENT)
Dept: SURGERY | Age: 64
End: 2019-05-07

## 2019-05-07 RX ORDER — SODIUM CHLORIDE 0.9 % (FLUSH) 0.9 %
10 SYRINGE (ML) INJECTION EVERY 12 HOURS SCHEDULED
Status: CANCELLED | OUTPATIENT
Start: 2019-05-07

## 2019-05-07 RX ORDER — SODIUM CHLORIDE 9 MG/ML
INJECTION, SOLUTION INTRAVENOUS CONTINUOUS
Status: CANCELLED | OUTPATIENT
Start: 2019-05-07

## 2019-05-07 RX ORDER — 0.9 % SODIUM CHLORIDE 0.9 %
10 VIAL (ML) INJECTION PRN
Status: CANCELLED | OUTPATIENT
Start: 2019-05-07

## 2019-05-08 ENCOUNTER — TELEPHONE (OUTPATIENT)
Dept: SURGERY | Age: 64
End: 2019-05-08

## 2019-05-08 NOTE — TELEPHONE ENCOUNTER
Attempted to call the patient to go over the colon prep and clear liquid diet. No answer, left message on the phone.   Electronically signed by Navjot Burch on 5/8/19 at 9:31 AM

## 2019-05-10 NOTE — TELEPHONE ENCOUNTER
2nd attempt, called the patient, no answer, left message on the phone.   Electronically signed by Rick Logan on 5/10/2019 at 3:13 PM

## 2019-05-14 NOTE — TELEPHONE ENCOUNTER
3rd attempt, no answer, left message on the phone. Letter mailed.   Electronically signed by Maciej Clement on 5/14/2019 at 12:00 PM

## 2019-05-30 NOTE — PROGRESS NOTES
[address]  [address], [address] [place] [address] Pedro  [phone]    Follow up Note      Timmy Hamilton     Date of Visit:  5/31/2019    CC:  Patient presents for follow up   Chief Complaint   Patient presents with    Follow-up     post procedure     HPI:    Pain is unchanged. Change in quality of symptoms:no. Medication side effects:not applicable . Recent diagnostic testing:none. Recent interventional procedures:none. He has been on anticoagulation medications to include ASA and NSAIDs. The patient  has not been on herbal supplements. The patient is not diabetic. Imaging:   Rt hand xray 3/2019 -   Findings: There is no evidence of fracture or dislocation. Patient is status   post fusion of the third proximal interphalangeal joint which was seen   before and appears to be unchanged. There is osteopenia of the osseous   structure. There is no interval change from prior study. . The soft   tissues appear normal.      Lumbar xray 3/2019 -   Vertebral body height and alignment are unremarkable. There are small   posterior spurs at L4-5. There is moderate disc space narrowing small   posterior spurs at L5-S1 and moderate size anterior spurs. There is   some sclerosis about the left SI joint.       There is some facet arthrosis in the lower lumbar spine. Small amount aortic vascular plaque.                                          Potential Aberrant Drug-Related Behavior:  Yes, pt requests opioid pain medications, and reports using medications that belong to a family member     Urine Drug Screening:  3/2019 inconsistent + hydrocodone and metabolite which is not on OARRS     OARRS report:  4/2019 shows prometh cough syrup only       Past Medical History:   Diagnosis Date    Anxiety     Chronic right-sided low back pain with right-sided sciatica 3/21/2019    Right hand pain        Past Surgical History:   Procedure Laterality Date    ARTHRODESIS      Right 3rd PIP joint    EPIDURAL STEROID INJECTION Right 4/11/2019    L5 S1 EPIDURAL STEROID INJECTION performed by Akila Bee DO at [address] [facility]      fusion    SH[facility] SURG[facility]      [facility] [place]       Prior to Admission medications    Medication Sig Start Date End Date Taking? Authorizing Provider   Multiple Vitamins-Minerals (CENTRUM PO) Take by mouth STOP PREOP MED   Yes Historical Provider, MD   Aspirin-Acetaminophen-Caffeine (EXCEDRIN PO) Take by mouth Q8 hrs.    Yes Historical Provider, MD   fluticasone [facility]) 50 MCG/ACT nasal spray 1 spray by Nasal route daily 2/28/19  Yes PAVAN Otto   loratadine (CLARITIN) 10 MG tablet Take 1 tablet by mouth daily 2/28/19  Yes PAVAN Otto       Allergies   Allergen Reactions    Penicillins Other (See Comments)                                                          Social History     Socioeconomic History    Marital status:      Spouse name: Not on file    Number of children: Not on file    Years of education: Not on file    Highest education level: Not on file   Occupational History    Not on file   Social Needs    Financial resource strain: Not on file    Food insecurity:     Worry: Not on file     Inability: Not on file    Transportation needs:     Medical: Not on file     Non-medical: Not on file   Tobacco Use    Smoking status: Current Some Day Smoker     Packs/day: 0.50     Years: 35.00     Pack years: 17.50     Types: Cigarettes    Smokeless tobacco: Never Used   Substance and Sexual Activity    Alcohol use: No    Drug use: Not Currently     Types: Cocaine     Comment: denies    Sexual activity: Not on file   Lifestyle    Physical activity:     Days per week: Not on file     Minutes per session: Not on file    Stress: Not on file   Relationships    Social connections:     Talks on phone: Not on file     Gets together: Not on file     Attends Pentecostalism service: Not on file     Active member of club or organization: Not on file     Attends meetings of clubs or organizations: Not on file     Relationship status: Not on file    Intimate partner violence:     Fear of current or ex partner: Not on file     Emotionally abused: Not on file     Physically abused: Not on file     Forced sexual activity: Not on file   Other Topics Concern    Not on file   Social History Narrative    Not on file       Family History   Problem Relation Age of Onset    Heart Disease Father         64    Stroke Mother        REVIEW OF SYSTEMS:     Luis Carlos denies fever/chills, chest pain, shortness of breath, new bowel or bladder complaints. All other review of systems was negative. PHYSICAL EXAMINATION:      /88   Pulse 74   Temp 98.6 °F (37 °C) (Oral)   Resp 16   Ht 5' 9\" (1.753 m)   Wt 203 lb (92.1 kg)   SpO2 99%   BMI 29.98 kg/m²     General:      General appearance:pleasant and well-hydrated, in no discomfort and A & O x3  Build:Overweight  Function:Moves about room without difficulty. HEENT:    Head:normocephalic and atraumatic  Pupils:regular, round and equal.  Sclera: icterus absent,    Lungs:    Breathing:Normal expansion. Abdomen:    Shape:non-distended and normal  Tenderness:none  Guarding:none    Lumbar spine:    Spine inspection:normal   CVA tenderness:No   Palpation:tenderness paravertebral muscles, facet loading, left, right and negative. Range of motion:abnormal mildly in lateral bending, flexion, extension rotation bilateral and is not painful.     Musculoskeletal:    SI joint tenderness:negative right, negative left              GRGE test:negative right, negative left  Piriformis tenderness:negative right, negative left  Trochanteric bursa tenderness:negative right, negative left  SLR:negative right, negative left, sitting     Extremities:    Tremors:None bilaterally upper and lower  Intact:Yes  Varicose veins:absent   Pulses:radial pulse 2+ left  Cyanosis:none  Edema:Normal      Neurological:    Sensory:normal to light touch   Motor:   Right Quadriceps5/5          Left Quadriceps5/5           Right Gastrocnemius5/5    Left Gastrocnemius5/5  Right Ant Tibialis5/5  Left Ant Tibialis5/5  [address]    Dermatology:    Skin:no unusual rashes, no skin lesions    Impression:      LBP radiating into the RLE in L5 distribution  Lumbar xray shows moderate DDD of L5-S1 and some sclerosis of left SIJ  Right shoulder pain s/p 2 surgeries on the [facility] joint  Rt third digit pain, s/p fusion surgery  Rt hand xray shows pt is s/p fusion of third PIP joint, osteopenia  [facility] pain mgmt in [place], first practice was dismissed for non-compliant use per records of second practice, second practice patient had injections and was treated with one tramadol daily for a period of time, per records of second practice the patient's [place] equivalent of OAR[place] report was concerning as patient had high quantities as well as repeated prometh cough syrup scripts without an active pulmonary process and the physician stated diversion could not be ruled out  Pt states he prefers opioid pain medications vs any type of interventional treatment  On disability  Pt moved back to the [facility] from [facility] 4 weeks prior to consult  Pt states his local cousin gets treated with pain medications and has been giving him some pills he's been taking intermittently which have been helpful   Previously discussed with patient I would not be treating him with controlled substances  Offered injections as patient has received 60-80% relief x 3 months with previous LESI     Xray rt shoulder and rt knee - encouraged to have done, still has not had done  OARRS report reviewed  Initial UDS is inconsistent with OARRS and is + for hydrocodone and metabolites  Pt is not a candidate for chronic opioid therapy  Voltaren gel in place of diclofenac PO was utilized and pt states it is not as helfpul as the PO dosing  PT - did 5 sessions and was a no show to last 2 sessions, states he does HEP at the [address] Repeat L5-S1 BETZY - pt prefers July, r/b discussed  Lumbar MRI w/o contrast for injection planning              Patient encouraged to stay active              Treatment plan discussed with the patient      Cc:   Referring physician

## 2019-05-31 ENCOUNTER — PREP FOR PROCEDURE (OUTPATIENT)
Dept: PAIN MANAGEMENT | Age: 64
End: 2019-05-31

## 2019-05-31 ENCOUNTER — OFFICE VISIT (OUTPATIENT)
Dept: PAIN MANAGEMENT | Age: 64
End: 2019-05-31
Payer: MEDICARE

## 2019-05-31 VITALS
SYSTOLIC BLOOD PRESSURE: 120 MMHG | DIASTOLIC BLOOD PRESSURE: 88 MMHG | TEMPERATURE: 98.6 F | HEART RATE: 74 BPM | OXYGEN SATURATION: 99 % | BODY MASS INDEX: 30.07 KG/M2 | RESPIRATION RATE: 16 BRPM | WEIGHT: 203 LBS | HEIGHT: 69 IN

## 2019-05-31 DIAGNOSIS — G89.29 CHRONIC RIGHT-SIDED LOW BACK PAIN WITH RIGHT-SIDED SCIATICA: ICD-10-CM

## 2019-05-31 DIAGNOSIS — M54.41 CHRONIC RIGHT-SIDED LOW BACK PAIN WITH RIGHT-SIDED SCIATICA: ICD-10-CM

## 2019-05-31 DIAGNOSIS — M54.16 LUMBAR RADICULOPATHY: Primary | ICD-10-CM

## 2019-05-31 DIAGNOSIS — G89.4 CHRONIC PAIN SYNDROME: Primary | ICD-10-CM

## 2019-05-31 DIAGNOSIS — M54.16 LUMBAR RADICULOPATHY: ICD-10-CM

## 2019-05-31 PROCEDURE — 4004F PT TOBACCO SCREEN RCVD TLK: CPT | Performed by: PAIN MEDICINE

## 2019-05-31 PROCEDURE — G8427 DOCREV CUR MEDS BY ELIG CLIN: HCPCS | Performed by: PAIN MEDICINE

## 2019-05-31 PROCEDURE — G8417 CALC BMI ABV UP PARAM F/U: HCPCS | Performed by: PAIN MEDICINE

## 2019-05-31 PROCEDURE — 99213 OFFICE O/P EST LOW 20 MIN: CPT | Performed by: PAIN MEDICINE

## 2019-05-31 PROCEDURE — 3017F COLORECTAL CA SCREEN DOC REV: CPT | Performed by: PAIN MEDICINE

## 2019-05-31 NOTE — PROGRESS NOTES
Katrin Thomas presents to the [facility] on 5/31/2019. Luis Carlos is complaining of pain *rt. Lower back. The pain is constant. The pain is described as aching. Pain is rated on his best day at a 6, on his worst day at a 7, and on average at a 0 on the VAS scale. He took his last dose of     Any procedures since your last visit    Pacemaker or defibrilator: No managed by none    He has been on anticoagulation medications to include NSAIDS and is managed by OTC.       /88   Pulse 74   Temp 98.6 °F (37 °C) (Oral)   Resp 16   Ht 5' 9\" (1.753 m)   Wt 203 lb (92.1 kg)   SpO2 99%   BMI 29.98 kg/m²

## 2019-05-31 NOTE — LETTER
Pain Management Department    8/21/2019    Dear Timmy Hamilton,    We find it necessary to inform you that we will no longer be able to serve as your pain management provider as of 8/21/2019. The primary difficulty has been multiple no shows for appointments and procedures. We recommend that you promptly find another pain management provider to continue your medical needs. We suggest you contact your insurance company at the phone number listed on the back of your insurance card for assistance in choosing a new physician. Any delay could jeopardize your health, so we urge you to act promptly. We will remain available to provide medical services to you, on an emergency basis only, for thirty (30) days from the date of this letter. A medical records release authorization form is enclosed for your convenience. Upon receipt of your signed authorization, our medical records department will forward a copy of your medical record to your new provider. Our physicians will also be happy to discuss your case with the physician who assumes your care. Please contact our office if you have any questions.     Sincerely:      Dr. Chanda Jara

## 2019-10-07 ENCOUNTER — HOSPITAL ENCOUNTER (EMERGENCY)
Age: 64
Discharge: HOME | End: 2019-10-07
Payer: MEDICARE

## 2019-10-07 VITALS
HEART RATE: 94 BPM | WEIGHT: 187 LBS | DIASTOLIC BLOOD PRESSURE: 89 MMHG | HEIGHT: 69 IN | TEMPERATURE: 96 F | RESPIRATION RATE: 14 BRPM | BODY MASS INDEX: 27.7 KG/M2 | OXYGEN SATURATION: 97 % | SYSTOLIC BLOOD PRESSURE: 143 MMHG

## 2019-10-07 DIAGNOSIS — K02.9 DENTAL CARIES: Primary | ICD-10-CM

## 2019-10-07 PROCEDURE — 6370000000 HC RX 637 (ALT 250 FOR IP): Performed by: NURSE PRACTITIONER

## 2019-10-07 PROCEDURE — 99282 EMERGENCY DEPT VISIT SF MDM: CPT

## 2019-10-07 RX ORDER — IBUPROFEN 800 MG/1
800 TABLET ORAL EVERY 6 HOURS PRN
Qty: 20 TABLET | Refills: 0 | Status: SHIPPED | OUTPATIENT
Start: 2019-10-07 | End: 2020-04-21 | Stop reason: ALTCHOICE

## 2019-10-07 RX ORDER — CLINDAMYCIN HYDROCHLORIDE 150 MG/1
300 CAPSULE ORAL ONCE
Status: COMPLETED | OUTPATIENT
Start: 2019-10-07 | End: 2019-10-07

## 2019-10-07 RX ORDER — OXYCODONE HYDROCHLORIDE AND ACETAMINOPHEN 5; 325 MG/1; MG/1
1 TABLET ORAL ONCE
Status: COMPLETED | OUTPATIENT
Start: 2019-10-07 | End: 2019-10-07

## 2019-10-07 RX ORDER — CLINDAMYCIN HYDROCHLORIDE 300 MG/1
300 CAPSULE ORAL 3 TIMES DAILY
Qty: 30 CAPSULE | Refills: 0 | Status: SHIPPED | OUTPATIENT
Start: 2019-10-07 | End: 2019-10-17

## 2019-10-07 RX ADMIN — OXYCODONE HYDROCHLORIDE AND ACETAMINOPHEN 1 TABLET: 5; 325 TABLET ORAL at 22:52

## 2019-10-07 RX ADMIN — CLINDAMYCIN HYDROCHLORIDE 300 MG: 150 CAPSULE ORAL at 22:52

## 2019-10-07 ASSESSMENT — PAIN DESCRIPTION - LOCATION: LOCATION: MOUTH;TEETH

## 2019-10-07 ASSESSMENT — PAIN DESCRIPTION - PROGRESSION: CLINICAL_PROGRESSION: GRADUALLY WORSENING

## 2019-10-07 ASSESSMENT — PAIN DESCRIPTION - ORIENTATION: ORIENTATION: LEFT

## 2019-10-07 ASSESSMENT — PAIN DESCRIPTION - PAIN TYPE: TYPE: ACUTE PAIN

## 2019-10-07 ASSESSMENT — PAIN DESCRIPTION - DESCRIPTORS: DESCRIPTORS: ACHING;THROBBING

## 2019-10-07 ASSESSMENT — PAIN DESCRIPTION - FREQUENCY: FREQUENCY: CONTINUOUS

## 2019-10-07 ASSESSMENT — PAIN SCALES - GENERAL
PAINLEVEL_OUTOF10: 10
PAINLEVEL_OUTOF10: 10

## 2019-10-07 ASSESSMENT — PAIN DESCRIPTION - ONSET: ONSET: ON-GOING

## 2019-10-10 ENCOUNTER — HOSPITAL ENCOUNTER (EMERGENCY)
Age: 64
Discharge: HOME | End: 2019-10-10
Attending: EMERGENCY MEDICINE
Payer: MEDICARE

## 2019-10-10 VITALS
BODY MASS INDEX: 27.47 KG/M2 | TEMPERATURE: 98 F | DIASTOLIC BLOOD PRESSURE: 86 MMHG | WEIGHT: 186 LBS | SYSTOLIC BLOOD PRESSURE: 168 MMHG | HEART RATE: 88 BPM | OXYGEN SATURATION: 97 % | RESPIRATION RATE: 17 BRPM

## 2019-10-10 VITALS
OXYGEN SATURATION: 98 % | WEIGHT: 186 LBS | SYSTOLIC BLOOD PRESSURE: 134 MMHG | TEMPERATURE: 97.7 F | RESPIRATION RATE: 18 BRPM | BODY MASS INDEX: 27.55 KG/M2 | DIASTOLIC BLOOD PRESSURE: 66 MMHG | HEART RATE: 62 BPM | HEIGHT: 69 IN

## 2019-10-10 DIAGNOSIS — R22.0 FACIAL SWELLING: ICD-10-CM

## 2019-10-10 DIAGNOSIS — K08.409 S/P TOOTH EXTRACTION: ICD-10-CM

## 2019-10-10 DIAGNOSIS — K02.9 PAIN DUE TO DENTAL CARIES: Primary | ICD-10-CM

## 2019-10-10 DIAGNOSIS — K08.89 PAIN, DENTAL: Primary | ICD-10-CM

## 2019-10-10 LAB
EKG ATRIAL RATE: 81 BPM
EKG P AXIS: 65 DEGREES
EKG P-R INTERVAL: 160 MS
EKG Q-T INTERVAL: 378 MS
EKG QRS DURATION: 84 MS
EKG QTC CALCULATION (BAZETT): 439 MS
EKG R AXIS: 33 DEGREES
EKG T AXIS: 60 DEGREES
EKG VENTRICULAR RATE: 81 BPM

## 2019-10-10 PROCEDURE — 99282 EMERGENCY DEPT VISIT SF MDM: CPT

## 2019-10-10 PROCEDURE — 6370000000 HC RX 637 (ALT 250 FOR IP): Performed by: PHYSICIAN ASSISTANT

## 2019-10-10 PROCEDURE — 93010 ELECTROCARDIOGRAM REPORT: CPT | Performed by: INTERNAL MEDICINE

## 2019-10-10 PROCEDURE — 93005 ELECTROCARDIOGRAM TRACING: CPT | Performed by: PHYSICIAN ASSISTANT

## 2019-10-10 RX ORDER — HYDROCODONE BITARTRATE AND ACETAMINOPHEN 5; 325 MG/1; MG/1
1 TABLET ORAL ONCE
Status: COMPLETED | OUTPATIENT
Start: 2019-10-10 | End: 2019-10-10

## 2019-10-10 RX ORDER — HYDROCODONE BITARTRATE AND ACETAMINOPHEN 5; 325 MG/1; MG/1
1 TABLET ORAL EVERY 6 HOURS PRN
Qty: 8 TABLET | Refills: 0 | Status: SHIPPED | OUTPATIENT
Start: 2019-10-10 | End: 2019-10-12

## 2019-10-10 RX ADMIN — HYDROCODONE BITARTRATE AND ACETAMINOPHEN 1 TABLET: 5; 325 TABLET ORAL at 12:19

## 2019-10-10 ASSESSMENT — PAIN SCALES - GENERAL
PAINLEVEL_OUTOF10: 5
PAINLEVEL_OUTOF10: 10
PAINLEVEL_OUTOF10: 8
PAINLEVEL_OUTOF10: 7
PAINLEVEL_OUTOF10: 9

## 2019-10-10 ASSESSMENT — PAIN DESCRIPTION - LOCATION
LOCATION: TEETH
LOCATION: FACE
LOCATION: TEETH

## 2019-10-10 ASSESSMENT — PAIN DESCRIPTION - PAIN TYPE: TYPE: ACUTE PAIN

## 2019-10-10 ASSESSMENT — PAIN DESCRIPTION - DESCRIPTORS
DESCRIPTORS: ACHING
DESCRIPTORS: SHARP

## 2020-02-24 ENCOUNTER — OFFICE VISIT (OUTPATIENT)
Dept: FAMILY MEDICINE CLINIC | Age: 65
End: 2020-02-24
Payer: MEDICARE

## 2020-02-24 VITALS
DIASTOLIC BLOOD PRESSURE: 83 MMHG | RESPIRATION RATE: 20 BRPM | SYSTOLIC BLOOD PRESSURE: 131 MMHG | TEMPERATURE: 98.5 F | OXYGEN SATURATION: 97 % | BODY MASS INDEX: 30.51 KG/M2 | HEIGHT: 69 IN | WEIGHT: 206 LBS | HEART RATE: 87 BPM

## 2020-02-24 PROCEDURE — G8417 CALC BMI ABV UP PARAM F/U: HCPCS | Performed by: PHYSICIAN ASSISTANT

## 2020-02-24 PROCEDURE — 90732 PPSV23 VACC 2 YRS+ SUBQ/IM: CPT | Performed by: PHYSICIAN ASSISTANT

## 2020-02-24 PROCEDURE — 90471 IMMUNIZATION ADMIN: CPT | Performed by: PHYSICIAN ASSISTANT

## 2020-02-24 PROCEDURE — G8427 DOCREV CUR MEDS BY ELIG CLIN: HCPCS | Performed by: PHYSICIAN ASSISTANT

## 2020-02-24 PROCEDURE — 99214 OFFICE O/P EST MOD 30 MIN: CPT | Performed by: PHYSICIAN ASSISTANT

## 2020-02-24 PROCEDURE — G8484 FLU IMMUNIZE NO ADMIN: HCPCS | Performed by: PHYSICIAN ASSISTANT

## 2020-02-24 PROCEDURE — 4004F PT TOBACCO SCREEN RCVD TLK: CPT | Performed by: PHYSICIAN ASSISTANT

## 2020-02-24 PROCEDURE — 3017F COLORECTAL CA SCREEN DOC REV: CPT | Performed by: PHYSICIAN ASSISTANT

## 2020-02-24 RX ORDER — FLUTICASONE PROPIONATE 50 MCG
1 SPRAY, SUSPENSION (ML) NASAL DAILY
Qty: 2 BOTTLE | Refills: 1 | Status: SHIPPED
Start: 2020-02-24 | End: 2020-04-17

## 2020-02-24 RX ORDER — KETOROLAC TROMETHAMINE 30 MG/ML
30 INJECTION, SOLUTION INTRAMUSCULAR; INTRAVENOUS ONCE
Status: COMPLETED | OUTPATIENT
Start: 2020-02-24 | End: 2020-02-24

## 2020-02-24 RX ORDER — LORATADINE 10 MG/1
10 TABLET ORAL DAILY
Qty: 30 TABLET | Refills: 0 | Status: SHIPPED | OUTPATIENT
Start: 2020-02-24 | End: 2020-03-25

## 2020-02-24 RX ADMIN — KETOROLAC TROMETHAMINE 30 MG: 30 INJECTION, SOLUTION INTRAMUSCULAR; INTRAVENOUS at 15:22

## 2020-02-24 ASSESSMENT — PATIENT HEALTH QUESTIONNAIRE - PHQ9
1. LITTLE INTEREST OR PLEASURE IN DOING THINGS: 1
SUM OF ALL RESPONSES TO PHQ QUESTIONS 1-9: 1
2. FEELING DOWN, DEPRESSED OR HOPELESS: 0
SUM OF ALL RESPONSES TO PHQ QUESTIONS 1-9: 1
SUM OF ALL RESPONSES TO PHQ9 QUESTIONS 1 & 2: 1

## 2020-02-24 NOTE — PROGRESS NOTES
[facility]   [facility], [address] [place] [address] N   [phone]      Loree Mask  1955      HPI:  Patient presents for f/u for chronic pain in his back, finger and shoulder. He was unhappy with Dr Haley Eduardo bc she would not prescribe opiate pain medication, which he very bluntly \"prefers. \" he lists his previous physicians, 2 of which are , that used to give him Norco. We discussed the law changes that prevent loose prescribing of these meds any longer. We discussed that Dr Haley Eduardo did not think this was indicated for him and he states \" I dont really care what she thinks, it is my body. \" he admits he took opiates that were not prescribed to him, and he has had inconsistent Oarrs and UDS. He has refused interventions stating \"im not letting someone stick needles in my back that may be an idiot. \" he has not done the xrays as ordered by Dr Haley Eduardo almost a year ago. The  at the hospital gave him the cards of Dr Melissa Paz and Dr Gerson Oden, and he is asking for a referral. When I told him that he would need the xrays and a finding that would make that appropriate he states that he \"just wants them to write the medications. \" he was clearly misinformed by the  about how this works. He also has nasal congestion. No sore throat and ear pain. No fevers. No cp or sob.      Past Medical History:   Diagnosis Date    Anxiety     Chronic right-sided low back pain with right-sided sciatica 3/21/2019    Elbow pain     Right hand pain         Past Surgical History:   Procedure Laterality Date    ARTHRODESIS      Right 3rd PIP joint    EPIDURAL STEROID INJECTION Right 2019    L5 S1 EPIDURAL STEROID INJECTION performed by Luis Hanson DO at [facility]      fusion    SHOULDER SURGERY      Right AC joint surgery       Current Outpatient Medications   Medication Sig Dispense Refill    fluticasone (FLONASE) 50 MCG/ACT nasal spray 1 spray by Each Nostril route daily 2 Bottle 1    loratadine (CLARITIN) 10 MG tablet Take 1 tablet by mouth daily 30 tablet 0    aspirin 325 MG tablet Take 325 mg by mouth daily      Multiple Vitamins-Minerals (CENTRUM PO) Take by mouth       Aspirin-Acetaminophen-Caffeine (EXCEDRIN PO) Take by mouth as needed Q8 hrs.        ibuprofen (ADVIL;MOTRIN) 800 MG tablet Take 1 tablet by mouth every 6 hours as needed for Pain 20 tablet 0     Current Facility-Administered Medications   Medication Dose Route Frequency Provider Last Rate Last Dose    ketorolac (TORADOL) injection 30 mg  30 mg Intravenous Once PAVAN [place]           Allergies   Allergen Reactions    Penicillins Other (See Comments)     Unknown reaction                                                   Family History   Problem Relation Age of Onset    Heart Disease Father         64    Stroke Mother        Social History     Socioeconomic History    Marital status:      Spouse name: Not on file    Number of children: Not on file    Years of education: Not on file    Highest education level: Not on file   Occupational History    Not on file   Social Needs    Financial resource strain: Not on file    Food insecurity:     Worry: Not on file     Inability: Not on file    Transportation needs:     Medical: Not on file     Non-medical: Not on file   Tobacco Use    Smoking status: Current Some Day Smoker     Packs/day: 0.50     Years: 35.00     Pack years: 17.50     Types: Cigarettes    Smokeless tobacco: Never Used   Substance and Sexual Activity    Alcohol use: No    Drug use: Not Currently     Types: Cocaine     Comment: denies    Sexual activity: Not on file   Lifestyle    Physical activity:     Days per week: Not on file     Minutes per session: Not on file    Stress: Not on file   Relationships    Social connections:     Talks on phone: Not on file     Gets together: Not on file     Attends Holiness service: Not on file     Active member of club or Rate and Rhythm: Normal rate and regular rhythm. Heart sounds: Normal heart sounds. No murmur. Pulmonary:      Effort: Pulmonary effort is normal. No accessory muscle usage or respiratory distress. Breath sounds: Normal breath sounds. No wheezing. Musculoskeletal: Normal range of motion. Skin:     General: Skin is warm and dry. Findings: No rash. Neurological:      Mental Status: He is alert and oriented to person, place, and time. Deep Tendon Reflexes: Reflexes are normal and symmetric. Psychiatric:         Speech: Speech normal.         Behavior: Behavior normal.           Assessment/Plan:     Luis Carlos was seen today for sinus problem, other and other. Diagnoses and all orders for this visit:    Lumbar radiculopathy  -     Artemio Collet, MD, Pain Medicine, [place]  -     ketorolac (TORADOL) injection 30 mg    Chronic right shoulder pain  -     Artemio Collet, MD, Pain Medicine, [place]    Osteoarthritis of finger of right hand  -     Artemio Collet, MD, Pain Medicine, [place]    Arthritis of right acromioclavicular joint  -     Artemio Collet, MD, Pain Medicine, [place]    Nasal congestion  -     fluticasone (FLONASE) 50 MCG/ACT nasal spray; 1 spray by Each Nostril route daily  -     loratadine (CLARITIN) 10 MG tablet; Take 1 tablet by mouth daily    Need for pneumococcal vaccination  -     Pneumococcal polysaccharide vaccine 23-valent greater than or equal to 1yo subcutaneous/IM        F/u prn. We had a long discussion on opiates and the laws and reasoning behind the strict rules we have for prescribing. He also has inconsistent UDS and Oarrs. We reviewed Dr Richter Skill note and the imaging she wanted. A referral to Dr Carson Dance and Delfina Cortes are not appropriate right now. Injections tolerated.      PAVAN Novoa

## 2020-03-26 ENCOUNTER — APPOINTMENT (OUTPATIENT)
Dept: GENERAL RADIOLOGY | Age: 65
End: 2020-03-26
Payer: MEDICARE

## 2020-03-26 ENCOUNTER — HOSPITAL ENCOUNTER (EMERGENCY)
Age: 65
Discharge: HOME | End: 2020-03-27
Payer: MEDICARE

## 2020-03-26 VITALS
WEIGHT: 206 LBS | RESPIRATION RATE: 16 BRPM | DIASTOLIC BLOOD PRESSURE: 86 MMHG | HEART RATE: 88 BPM | HEIGHT: 69 IN | SYSTOLIC BLOOD PRESSURE: 159 MMHG | BODY MASS INDEX: 30.51 KG/M2 | TEMPERATURE: 97.1 F | OXYGEN SATURATION: 95 %

## 2020-03-26 PROCEDURE — 99283 EMERGENCY DEPT VISIT LOW MDM: CPT

## 2020-03-26 PROCEDURE — 6360000002 HC RX W HCPCS: Performed by: PHYSICIAN ASSISTANT

## 2020-03-26 PROCEDURE — 96372 THER/PROPH/DIAG INJ SC/IM: CPT

## 2020-03-26 PROCEDURE — 73130 X-RAY EXAM OF HAND: CPT

## 2020-03-26 PROCEDURE — 73070 X-RAY EXAM OF ELBOW: CPT

## 2020-03-26 PROCEDURE — 73630 X-RAY EXAM OF FOOT: CPT

## 2020-03-26 PROCEDURE — 73030 X-RAY EXAM OF SHOULDER: CPT

## 2020-03-26 RX ORDER — KETOROLAC TROMETHAMINE 30 MG/ML
30 INJECTION, SOLUTION INTRAMUSCULAR; INTRAVENOUS ONCE
Status: COMPLETED | OUTPATIENT
Start: 2020-03-26 | End: 2020-03-26

## 2020-03-26 RX ORDER — CYCLOBENZAPRINE HCL 10 MG
10 TABLET ORAL 3 TIMES DAILY PRN
Qty: 21 TABLET | Refills: 0 | Status: SHIPPED | OUTPATIENT
Start: 2020-03-26 | End: 2020-04-05

## 2020-03-26 RX ORDER — IBUPROFEN 800 MG/1
800 TABLET ORAL EVERY 8 HOURS PRN
Qty: 42 TABLET | Refills: 0 | Status: SHIPPED | OUTPATIENT
Start: 2020-03-26 | End: 2020-04-21 | Stop reason: ALTCHOICE

## 2020-03-26 RX ADMIN — KETOROLAC TROMETHAMINE 30 MG: 30 INJECTION, SOLUTION INTRAMUSCULAR at 15:00

## 2020-03-26 ASSESSMENT — ENCOUNTER SYMPTOMS
BACK PAIN: 0
CHEST TIGHTNESS: 0
ABDOMINAL PAIN: 0
DIARRHEA: 0
VOMITING: 0
COLOR CHANGE: 0
COUGH: 0
NAUSEA: 0
SHORTNESS OF BREATH: 0
CONSTIPATION: 0

## 2020-03-26 ASSESSMENT — PAIN DESCRIPTION - ORIENTATION: ORIENTATION: RIGHT

## 2020-03-26 ASSESSMENT — PAIN DESCRIPTION - DESCRIPTORS: DESCRIPTORS: ACHING

## 2020-03-26 ASSESSMENT — PAIN DESCRIPTION - PAIN TYPE: TYPE: ACUTE PAIN

## 2020-03-26 ASSESSMENT — PAIN DESCRIPTION - FREQUENCY: FREQUENCY: INTERMITTENT

## 2020-03-26 ASSESSMENT — PAIN DESCRIPTION - LOCATION: LOCATION: ARM;BACK;SHOULDER

## 2020-03-26 ASSESSMENT — PAIN SCALES - GENERAL
PAINLEVEL_OUTOF10: 9
PAINLEVEL_OUTOF10: 9

## 2020-03-26 ASSESSMENT — PAIN DESCRIPTION - ONSET: ONSET: ON-GOING

## 2020-03-26 NOTE — ED PROVIDER NOTES
[facility]        Department of Emergency Medicine   ED  Provider Note  Admit Date/RoomTime: 3/26/2020  2:39 PM  ED Room: 38/38  HPI:  3/26/20, Time: 2:54 PM EDT      The patient is a 80-year-old male presenting to the emergency department with right shoulder pain, right hand pain and right foot pain after a fall 2 days ago. Patient states he was walking up some rocks rain 2 days ago and slipped and fell landing predominantly on his right side. He hit his right shoulder and his right hand. He also thinks he \"sprained his foot but has been able to ambulate on it. He has been taking Motrin 800 mg for the pain with minimal improvement. Patient denies use of blood thinners. He did not hit his head or have LOC. He denies any numbness or tingling in the arms or legs, warmth or redness, any open wounds, neck or back pain. The history is provided by the patient. No  was used. REVIEW OF SYSTEMS:  Review of Systems   Constitutional: Negative for activity change, chills, fatigue and fever. Respiratory: Negative for cough, chest tightness and shortness of breath. Cardiovascular: Negative for chest pain, palpitations and leg swelling. Gastrointestinal: Negative for abdominal pain, constipation, diarrhea, nausea and vomiting. Genitourinary: Negative for dysuria, flank pain, frequency and hematuria. Musculoskeletal: Positive for arthralgias and joint swelling. Negative for back pain, myalgias, neck pain and neck stiffness. Skin: Negative for color change, pallor and rash. Neurological: Negative for dizziness, light-headedness and headaches. Psychiatric/Behavioral: Negative for agitation, behavioral problems and confusion.       Pertinent positives and negatives are stated within HPI, all other systems reviewed and are negative.      --------------------------------------------- PAST HISTORY ---------------------------------------------  Past Medical History:  has a past medical history of Anxiety, Chronic right-sided low back pain with right-sided sciatica, Elbow pain, and Right hand pain. Past Surgical History:  has a past surgical history that includes shoulder surgery; arthrodesis; Finger surgery; and epidural steroid injection (Right, 4/11/2019). Social History:  reports that he has been smoking cigarettes. He has a 17.50 pack-year smoking history. He has never used smokeless tobacco. He reports previous drug use. Drug: Cocaine. He reports that he does not drink alcohol. Family History: family history includes Heart Disease in his father; Stroke in his mother. The patients home medications have been reviewed. Allergies: Penicillins    -------------------------------------------------- RESULTS -------------------------------------------------  All laboratory and radiology results have been personally reviewed by myself   LABS:  No results found for this visit on 03/26/20. RADIOLOGY:  Interpreted by Radiologist.  XR FOOT RIGHT (MIN 3 VIEWS)   Final Result   No significant abnormal findings. XR HAND RIGHT (MIN 3 VIEWS)   Final Result   No acute process               XR ELBOW RIGHT (2 VIEWS)   Final Result      1. No acute fracture or dislocation at the right shoulder or right   elbow. 2. Prominent olecranon spur. 3. Minimal calcific tendinitis suggested adjacent to the humeral head. XR SHOULDER RIGHT (MIN 2 VIEWS)   Final Result      1. No acute fracture or dislocation at the right shoulder or right   elbow. 2. Prominent olecranon spur. 3. Minimal calcific tendinitis suggested adjacent to the humeral head.          ------------------------- NURSING NOTES AND VITALS REVIEWED ---------------------------   The nursing notes within the ED encounter and vital signs as below have been reviewed.    BP (!) 159/86   Pulse 88   Temp 97.1 °F (36.2 °C) (Temporal)   Resp 16   Ht 5' 9\" (1.753 m)   Wt 206 lb (93.4 kg)   SpO2 95%   BMI 30.42 kg/m² Oxygen Saturation Interpretation: Normal      ---------------------------------------------------PHYSICAL EXAM--------------------------------------    Physical Exam  Vitals signs and nursing note reviewed. Constitutional:       General: He is not in acute distress. Appearance: Normal appearance. He is well-developed. He is not ill-appearing. HENT:      Head: Normocephalic and atraumatic. Mouth/Throat:      Mouth: Mucous membranes are moist.      Pharynx: Oropharynx is clear. Neck:      Musculoskeletal: Normal range of motion and neck supple. No neck rigidity. Cardiovascular:      Rate and Rhythm: Normal rate and regular rhythm. Heart sounds: Normal heart sounds. No murmur. Pulmonary:      Effort: Pulmonary effort is normal. No respiratory distress. Breath sounds: Normal breath sounds. Musculoskeletal: Normal range of motion. General: Swelling and tenderness present. No deformity. Comments: TTP over RT trapezius muscle and anterior RT shoulder. TTP over RT index finger with overlying edema at the PIP joint. TTP to RT foot at the base of the RT 2nd-4th toes. FROM of the toes. No edema or erythema. Distal sensation intact all extremities. Normal cap refill. Skin:     General: Skin is warm and dry. Capillary Refill: Capillary refill takes less than 2 seconds. Findings: No erythema. Neurological:      General: No focal deficit present. Mental Status: He is alert and oriented to person, place, and time. Mental status is at baseline. Coordination: Coordination normal.   Psychiatric:         Mood and Affect: Mood normal.         Behavior: Behavior normal.         Thought Content:  Thought content normal.            ------------------------------ ED COURSE/MEDICAL DECISION MAKING----------------------  Medications   ketorolac (TORADOL) injection 30 mg (30 mg Intramuscular Given 3/26/20 1500)         ED COURSE:  ED Course as of Mar 26 1551   Thu Mar 26, 2020   1545 XR FOOT RIGHT (MIN 3 VIEWS) [BE]      ED Course User Index  [BE] Taty Parker PA-C      XR FOOT RIGHT (MIN 3 VIEWS)   Final Result   No significant abnormal findings. XR HAND RIGHT (MIN 3 VIEWS)   Final Result   No acute process               XR ELBOW RIGHT (2 VIEWS)   Final Result      1. No acute fracture or dislocation at the right shoulder or right   elbow. 2. Prominent olecranon spur. 3. Minimal calcific tendinitis suggested adjacent to the humeral head. XR SHOULDER RIGHT (MIN 2 VIEWS)   Final Result      1. No acute fracture or dislocation at the right shoulder or right   elbow. 2. Prominent olecranon spur. 3. Minimal calcific tendinitis suggested adjacent to the humeral head. Procedures:  Procedures     Medical Decision Making:   MDM   17-year-old male presenting to the ED with arthralgias after mechanical fall 2 days ago. Patient has been taking Motrin 800 for the pain. He has no signs of neurovascular deficits, deformities or crepitus. Pt given IM toradol. Patient has no acute fractures or dislocations. He does have a bony spur in his right elbow which is likely consistent with olecranon bursitis. Patient placed in an Ace wrap. He will be treated with ibuprofen 800 and Flexeril. He is encouraged to ice the joints as well. Patient advised to follow-up with his primary care physician or to return with any new or worsening symptoms. Counseling: The emergency provider has spoken with the patient and discussed todays results, in addition to providing specific details for the plan of care and counseling regarding the diagnosis and prognosis. Questions are answered at this time and they are agreeable with the plan.      --------------------------------- IMPRESSION AND DISPOSITION ---------------------------------    IMPRESSION  1. Fall, initial encounter    2. Olecranon bursitis of right elbow    3.  Arthralgia, unspecified joint DISPOSITION  Disposition: Discharge to home  Patient condition is good      Electronically signed by Manav Clarke PA-C   DD: 3/26/20  **This report was transcribed using voice recognition software. Every effort was made to ensure accuracy; however, inadvertent computerized transcription errors may be present.   END OF ED PROVIDER NOTE         Manav Clarke PA-C  03/26/20 1557

## 2020-04-07 ENCOUNTER — HOSPITAL ENCOUNTER (EMERGENCY)
Age: 65
Discharge: HOME | End: 2020-04-07
Payer: MEDICARE

## 2020-04-07 VITALS
RESPIRATION RATE: 16 BRPM | OXYGEN SATURATION: 95 % | HEART RATE: 78 BPM | SYSTOLIC BLOOD PRESSURE: 138 MMHG | DIASTOLIC BLOOD PRESSURE: 78 MMHG | TEMPERATURE: 96.9 F

## 2020-04-07 PROCEDURE — 99282 EMERGENCY DEPT VISIT SF MDM: CPT

## 2020-04-07 PROCEDURE — 96372 THER/PROPH/DIAG INJ SC/IM: CPT

## 2020-04-07 PROCEDURE — 6360000002 HC RX W HCPCS: Performed by: NURSE PRACTITIONER

## 2020-04-07 PROCEDURE — 69210 REMOVE IMPACTED EAR WAX UNI: CPT

## 2020-04-07 RX ORDER — KETOROLAC TROMETHAMINE 30 MG/ML
60 INJECTION, SOLUTION INTRAMUSCULAR; INTRAVENOUS ONCE
Status: COMPLETED | OUTPATIENT
Start: 2020-04-07 | End: 2020-04-07

## 2020-04-07 RX ADMIN — KETOROLAC TROMETHAMINE 60 MG: 30 INJECTION, SOLUTION INTRAMUSCULAR at 17:13

## 2020-04-07 ASSESSMENT — PAIN SCALES - GENERAL
PAINLEVEL_OUTOF10: 10
PAINLEVEL_OUTOF10: 10

## 2020-04-07 ASSESSMENT — PAIN DESCRIPTION - ORIENTATION: ORIENTATION: RIGHT

## 2020-04-08 ENCOUNTER — TELEPHONE (OUTPATIENT)
Dept: ADMINISTRATIVE | Age: 65
End: 2020-04-08

## 2020-04-08 NOTE — ED PROVIDER NOTES
[facility]    Department of Emergency Medicine   ED  Provider Note  Admit Date/RoomTime: 4/7/2020  4:47 PM  ED Room: 31/31  Chief Complaint   Otalgia (left ear x 1 week ) and Generalized Body Aches (ongoing right elbow and right shoulder and right foot pain, exacerbated by fall that patient was evaluated for on 3/26)    History of Present Illness   Source of history provided by:  patient. History/Exam Limitations: none. Harman Hutchison is a 59 y.o. old male who has a past medical history of:   Past Medical History:   Diagnosis Date    Anxiety     Chronic right-sided low back pain with right-sided sciatica 3/21/2019    Elbow pain     Right hand pain     presents to the emergency department by private vehicle requesting medication(s) as result of:    [x]   Running out of Medication(s)    []   Lost Medication(s)    []   Lost Prescription(s)    []   Medication(s) Julio Cesar Roshni               He has been without requested medications for approximately a \"while\" and is not currently enrolled in a pain management program. He states long standing history of back pain, used to be in pain management [place] and is no longer getting pain management. He states he attempted epidurals but \"the lady couldn't get it because she was trying to see if I was miller\". He denies any fever, chills, bowel/bladder dysfunction or perianal numbness. He reports fall approximately 2 weeks ago and was seen had xrays done that were negative, denies new injury to back but reports right foot is still swollen. He also complains of fullness to left ear. ROS   Pertinent positives and negatives are stated within HPI, all other systems reviewed and are negative. Past Surgical History:  has a past surgical history that includes shoulder surgery; arthrodesis; Finger surgery; and epidural steroid injection (Right, 4/11/2019). Social History:  reports that he has been smoking cigarettes. He has a 17.50 pack-year smoking history. DAILY Starting Tue 4/7/2020, Disp-2 Tube, R-0, Print           Electronically signed by RADHA Hickey CNP   DD: 4/8/20  **This report was transcribed using voice recognition software. Every effort was made to ensure accuracy; however, inadvertent computerized transcription errors may be present.   END OF ED PROVIDER NOTE       RADHA Rowell CNP  04/08/20 1039

## 2020-04-09 ENCOUNTER — TELEPHONE (OUTPATIENT)
Dept: PRIMARY CARE CLINIC | Age: 65
End: 2020-04-09

## 2020-04-09 NOTE — TELEPHONE ENCOUNTER
Patient called this am stating he has pain in his AC joint, fingers, toes and feet. He wishes for something. He also has sinus and wishes for his provider to call him back for guidance.

## 2020-04-12 ENCOUNTER — HOSPITAL ENCOUNTER (EMERGENCY)
Age: 65
Discharge: HOME | End: 2020-04-12
Attending: EMERGENCY MEDICINE
Payer: MEDICARE

## 2020-04-12 VITALS
RESPIRATION RATE: 18 BRPM | SYSTOLIC BLOOD PRESSURE: 160 MMHG | HEIGHT: 69 IN | OXYGEN SATURATION: 98 % | WEIGHT: 206 LBS | BODY MASS INDEX: 30.51 KG/M2 | HEART RATE: 86 BPM | TEMPERATURE: 98.2 F | DIASTOLIC BLOOD PRESSURE: 88 MMHG

## 2020-04-12 PROCEDURE — 99282 EMERGENCY DEPT VISIT SF MDM: CPT

## 2020-04-12 RX ORDER — HYDROCODONE BITARTRATE AND ACETAMINOPHEN 5; 325 MG/1; MG/1
1 TABLET ORAL EVERY 4 HOURS PRN
Qty: 18 TABLET | Refills: 0 | Status: SHIPPED | OUTPATIENT
Start: 2020-04-12 | End: 2020-04-15

## 2020-04-12 ASSESSMENT — PAIN DESCRIPTION - DESCRIPTORS
DESCRIPTORS: THROBBING;CONSTANT
DESCRIPTORS: THROBBING;CONSTANT

## 2020-04-12 ASSESSMENT — PAIN DESCRIPTION - ORIENTATION
ORIENTATION: RIGHT
ORIENTATION: RIGHT

## 2020-04-12 ASSESSMENT — PAIN SCALES - GENERAL
PAINLEVEL_OUTOF10: 10
PAINLEVEL_OUTOF10: 7

## 2020-04-12 ASSESSMENT — PAIN DESCRIPTION - FREQUENCY
FREQUENCY: CONTINUOUS
FREQUENCY: CONTINUOUS

## 2020-04-12 NOTE — ED PROVIDER NOTES
Department of Emergency Medicine   ED  Provider Note  Admit Date/RoomTime: 4/12/2020  9:50 AM  ED Room: 31/31          History of Present Illness:  4/12/20, Time: 10:03 AM EDT  Chief Complaint   Patient presents with    Shoulder Pain     chronic right shoulder pain from injury years ago-was seen here twice in last 2 weeks-has appt for tuesday but in too much pain-c/o right middle finger and right 4th toe pain. Cami Griffin is a 59 y.o. male presenting to the ED for right shoulder pain, beginning several weeks ago. The complaint has been persistent, mild in severity, and worsened by movement of the right shoulder. Patient states that he was evaluated for an ongoing right shoulder pain that has not gotten much better with Voltaren gel and muscle relaxer. Patient describes a pain to the superior right shoulder that radiates slightly into the lateral shoulder that worsens with movement, especially abduction. Patient denies any numbness or tingling to his hand or fingers. He denies any recent fall or trauma. He denies any neck or chest pain. Review of Systems:   Pertinent positives and negatives are stated within HPI, all other systems reviewed and are negative.        --------------------------------------------- PAST HISTORY ---------------------------------------------  Past Medical History:  has a past medical history of Anxiety, Chronic right-sided low back pain with right-sided sciatica, Elbow pain, and Right hand pain. Past Surgical History:  has a past surgical history that includes shoulder surgery; arthrodesis; Finger surgery; and epidural steroid injection (Right, 4/11/2019). Social History:  reports that he has been smoking cigarettes. He has a 17.50 pack-year smoking history. He has never used smokeless tobacco. He reports previous drug use. Drug: Cocaine. He reports that he does not drink alcohol.     Family History: family history includes Heart Disease in his father; Stroke in his mother. . Unless otherwise noted, family history is non contributory    The patients home medications have been reviewed. Allergies: Penicillins        ---------------------------------------------------PHYSICAL EXAM--------------------------------------    Constitutional/General: Alert and oriented x3  Head: Normocephalic and atraumatic  Eyes: PERRL, EOMI, sclera non icteric  Mouth: Oropharynx clear, handling secretions, no trismus, no asymmetry of the posterior oropharynx or uvular edema  Neck: Supple, full ROM, no stridor, no meningeal signs  Respiratory: Lungs clear to auscultation bilaterally, no wheezes, rales, or rhonchi. Not in respiratory distress  Cardiovascular:  Regular rate. Regular rhythm. No murmurs, no aortic murmurs, no gallops, or rubs. 2+ distal pulses. Equal extremity pulses. Chest: No chest wall tenderness  GI:  Abdomen Soft, Non tender, Non distended. No rebound, guarding, or rigidity. No pulsatile masses. Musculoskeletal: Limited range of motion both passive and active to the right shoulder mostly in abduction and flexion. No bony tenderness. Warm and well perfused, no clubbing, cyanosis, or edema. Capillary refill <3 seconds  Integument: skin warm and dry. No rashes. Neurologic: GCS 15, no focal deficits, symmetric strength 5/5 in the upper and lower extremities bilaterally  Psychiatric: Normal Affect          -------------------------------------------------- RESULTS -------------------------------------------------  I have personally reviewed all laboratory and imaging results for this patient. Results are listed below.      LABS: (Lab results interpreted by me)  No results found for this visit on 04/12/20.,       RADIOLOGY:  Interpreted by Radiologist unless otherwise specified  No orders to display         ------------------------- NURSING NOTES AND VITALS REVIEWED ---------------------------   The nursing notes within the ED encounter and vital signs as below have Bita Henry,   04/12/20 1019

## 2020-04-14 ENCOUNTER — TELEPHONE (OUTPATIENT)
Dept: PRIMARY CARE CLINIC | Age: 65
End: 2020-04-14

## 2020-04-14 NOTE — TELEPHONE ENCOUNTER
[facility] ED Follow Up Call    ED Visit Date:  2020    Patient: Elaine Zimmer Patient : 1955     PCP: PAVAN Mario     Left message to call the office back regarding ED visit. Patient was seen in the ED on 2020. Awaiting call back.

## 2020-04-17 ENCOUNTER — HOSPITAL ENCOUNTER (EMERGENCY)
Age: 65
Discharge: HOME | End: 2020-04-17
Attending: EMERGENCY MEDICINE
Payer: MEDICARE

## 2020-04-17 VITALS
WEIGHT: 210 LBS | SYSTOLIC BLOOD PRESSURE: 136 MMHG | DIASTOLIC BLOOD PRESSURE: 93 MMHG | BODY MASS INDEX: 31.1 KG/M2 | OXYGEN SATURATION: 100 % | RESPIRATION RATE: 18 BRPM | HEART RATE: 98 BPM | HEIGHT: 69 IN | TEMPERATURE: 98.4 F

## 2020-04-17 PROCEDURE — 99282 EMERGENCY DEPT VISIT SF MDM: CPT

## 2020-04-17 RX ORDER — NAPROXEN 500 MG/1
500 TABLET ORAL 2 TIMES DAILY WITH MEALS
Qty: 20 TABLET | Refills: 0 | Status: SHIPPED | OUTPATIENT
Start: 2020-04-17 | End: 2020-04-21 | Stop reason: SDUPTHER

## 2020-04-17 RX ORDER — FLUTICASONE PROPIONATE 50 MCG
1 SPRAY, SUSPENSION (ML) NASAL DAILY
Qty: 1 BOTTLE | Refills: 0 | Status: SHIPPED | OUTPATIENT
Start: 2020-04-17 | End: 2020-09-04 | Stop reason: SDUPTHER

## 2020-04-17 RX ORDER — METHYLPREDNISOLONE 4 MG/1
TABLET ORAL
Qty: 1 KIT | Refills: 0 | Status: SHIPPED | OUTPATIENT
Start: 2020-04-17 | End: 2020-04-23

## 2020-04-17 RX ORDER — CLINDAMYCIN HYDROCHLORIDE 300 MG/1
300 CAPSULE ORAL 2 TIMES DAILY
Qty: 20 CAPSULE | Refills: 0 | Status: SHIPPED | OUTPATIENT
Start: 2020-04-17 | End: 2020-04-27

## 2020-04-17 ASSESSMENT — PAIN DESCRIPTION - LOCATION: LOCATION: SHOULDER

## 2020-04-17 ASSESSMENT — PAIN DESCRIPTION - FREQUENCY: FREQUENCY: CONTINUOUS

## 2020-04-17 ASSESSMENT — PAIN DESCRIPTION - ORIENTATION: ORIENTATION: RIGHT

## 2020-04-17 ASSESSMENT — PAIN DESCRIPTION - DESCRIPTORS: DESCRIPTORS: ACHING;CONSTANT

## 2020-04-17 ASSESSMENT — PAIN SCALES - GENERAL: PAINLEVEL_OUTOF10: 10

## 2020-04-21 ENCOUNTER — OFFICE VISIT (OUTPATIENT)
Dept: FAMILY MEDICINE CLINIC | Age: 65
End: 2020-04-21
Payer: MEDICARE

## 2020-04-21 VITALS
OXYGEN SATURATION: 97 % | BODY MASS INDEX: 31.04 KG/M2 | DIASTOLIC BLOOD PRESSURE: 86 MMHG | WEIGHT: 209.6 LBS | HEART RATE: 83 BPM | TEMPERATURE: 98.1 F | HEIGHT: 69 IN | RESPIRATION RATE: 16 BRPM | SYSTOLIC BLOOD PRESSURE: 118 MMHG

## 2020-04-21 PROCEDURE — G8427 DOCREV CUR MEDS BY ELIG CLIN: HCPCS | Performed by: FAMILY MEDICINE

## 2020-04-21 PROCEDURE — 4004F PT TOBACCO SCREEN RCVD TLK: CPT | Performed by: FAMILY MEDICINE

## 2020-04-21 PROCEDURE — G8417 CALC BMI ABV UP PARAM F/U: HCPCS | Performed by: FAMILY MEDICINE

## 2020-04-21 PROCEDURE — 3017F COLORECTAL CA SCREEN DOC REV: CPT | Performed by: FAMILY MEDICINE

## 2020-04-21 PROCEDURE — 4130F TOPICAL PREP RX AOE: CPT | Performed by: FAMILY MEDICINE

## 2020-04-21 PROCEDURE — 99214 OFFICE O/P EST MOD 30 MIN: CPT | Performed by: FAMILY MEDICINE

## 2020-04-21 RX ORDER — CIPROFLOXACIN/HYDROCORTISONE 0.2 %-1 %
3 SUSPENSION, DROPS(FINAL DOSAGE FORM)(ML) OTIC (EAR) 2 TIMES DAILY
Qty: 1 BOTTLE | Refills: 0 | Status: SHIPPED | OUTPATIENT
Start: 2020-04-21 | End: 2020-04-28

## 2020-04-21 RX ORDER — NAPROXEN 500 MG/1
500 TABLET ORAL 2 TIMES DAILY WITH MEALS
Qty: 60 TABLET | Refills: 1 | Status: SHIPPED
Start: 2020-04-21 | End: 2020-09-04

## 2020-04-21 RX ORDER — BROMPHENIRAMINE MALEATE, PSEUDOEPHEDRINE HYDROCHLORIDE, AND DEXTROMETHORPHAN HYDROBROMIDE 2; 30; 10 MG/5ML; MG/5ML; MG/5ML
5 SYRUP ORAL 4 TIMES DAILY PRN
Qty: 240 ML | Refills: 1 | Status: SHIPPED
Start: 2020-04-21 | End: 2020-05-19 | Stop reason: SDUPTHER

## 2020-04-21 RX ORDER — IBUPROFEN 600 MG/1
1 TABLET ORAL EVERY 6 HOURS PRN
COMMUNITY
Start: 2020-02-20 | End: 2020-05-19 | Stop reason: SDUPTHER

## 2020-04-21 NOTE — PROGRESS NOTES
Chief Complaint   Patient presents with    Shoulder Pain     Right, injection did not help; was told to f/u w/PCP    Finger Pain     Right middle    Discuss Medications     Requesting \"pain medicine\" prescribed by ED physician. Informed him we cannot prescribe narcotics       HPI:  Bhavani Solano presents to the office for ER follow up. Patient of Erica COLLIER Seen in ED 3/26, 4/7, 4/12, and 4/17  Parviz Gimenez initially, slipped on rock, +shoulder/toe/finger pain  Impacted cerumen removed 4/7 and debrox given  XR 3/26 with calcified tendinosis and arthritic changes  No help with flexeril or voltaren gel  Pain with ROM, ROM limited  Previous fusion of right middle finger  Most recently given medrol pack and naproxen, questionable relief  Narcotics given some of the ED visits  Right shoulder pain, chronic issue, no previous joint injection (only ketorolac), no previous PT, no previous MRI, no previous ortho evaluation  Seen previously by pain management, reviewed notes, +discrepancies with UDS, +not a candidate for chronic opiate therapy as patient is requesting  Opiates in past, thinks works better  Concerned about \"injections\"  Pain management referral placed in February, no appt made at this point  No fever  No h/o kidney disease  No h/o bleeding ulcer    Left ear still hurting, trouble hearing from it  Asking for cough medicine, +occ congestion    Patient's past medical, surgical, social and/or family history reviewed, updated in chart, and are non-contributory (unless otherwise stated). Medications and allergies also reviewed and updated in chart. Vitals:    04/21/20 1122   BP: 118/86   Site: Left Upper Arm   Position: Sitting   Cuff Size: Medium Adult   Pulse: 83   Resp: 16   Temp: 98.1 °F (36.7 °C)   TempSrc: Oral   SpO2: 97%   Weight: 209 lb 9.6 oz (95.1 kg)   Height: 5' 9\" (1.753 m)     Body mass index is 30.95 kg/m².   General:  Patient alert and oriented x 3, NAD, pleasant  HEENT:  Atraumatic, normocephalic,

## 2020-05-19 ENCOUNTER — OFFICE VISIT (OUTPATIENT)
Dept: FAMILY MEDICINE CLINIC | Age: 65
End: 2020-05-19
Payer: MEDICARE

## 2020-05-19 VITALS
RESPIRATION RATE: 18 BRPM | HEIGHT: 69 IN | OXYGEN SATURATION: 97 % | HEART RATE: 85 BPM | TEMPERATURE: 96.9 F | DIASTOLIC BLOOD PRESSURE: 68 MMHG | SYSTOLIC BLOOD PRESSURE: 96 MMHG | BODY MASS INDEX: 31.87 KG/M2 | WEIGHT: 215.2 LBS

## 2020-05-19 PROCEDURE — G8427 DOCREV CUR MEDS BY ELIG CLIN: HCPCS | Performed by: FAMILY MEDICINE

## 2020-05-19 PROCEDURE — 4004F PT TOBACCO SCREEN RCVD TLK: CPT | Performed by: FAMILY MEDICINE

## 2020-05-19 PROCEDURE — 99213 OFFICE O/P EST LOW 20 MIN: CPT | Performed by: FAMILY MEDICINE

## 2020-05-19 PROCEDURE — 3017F COLORECTAL CA SCREEN DOC REV: CPT | Performed by: FAMILY MEDICINE

## 2020-05-19 PROCEDURE — G8417 CALC BMI ABV UP PARAM F/U: HCPCS | Performed by: FAMILY MEDICINE

## 2020-05-19 RX ORDER — IBUPROFEN 600 MG/1
600 TABLET ORAL EVERY 6 HOURS PRN
Qty: 90 TABLET | Refills: 0 | Status: SHIPPED
Start: 2020-05-19 | End: 2021-04-12

## 2020-05-19 RX ORDER — DOXYCYCLINE HYCLATE 100 MG
100 TABLET ORAL 2 TIMES DAILY
Qty: 14 TABLET | Refills: 0 | Status: SHIPPED
Start: 2020-05-19 | End: 2021-01-29 | Stop reason: SDUPTHER

## 2020-05-19 RX ORDER — BROMPHENIRAMINE MALEATE, PSEUDOEPHEDRINE HYDROCHLORIDE, AND DEXTROMETHORPHAN HYDROBROMIDE 2; 30; 10 MG/5ML; MG/5ML; MG/5ML
5 SYRUP ORAL 4 TIMES DAILY PRN
Qty: 240 ML | Refills: 1 | Status: SHIPPED
Start: 2020-05-19 | End: 2020-11-21 | Stop reason: SDUPTHER

## 2020-05-19 NOTE — PROGRESS NOTES
medication before taking. Patient and/or guardian verbalizes understanding and agrees with above counseling, assessment and plan. All questions answered.

## 2020-06-03 ENCOUNTER — OFFICE VISIT (OUTPATIENT)
Dept: ORTHOPEDIC SURGERY | Age: 65
End: 2020-06-03
Payer: MEDICARE

## 2020-06-03 VITALS — BODY MASS INDEX: 31.84 KG/M2 | WEIGHT: 215 LBS | TEMPERATURE: 97.3 F | HEIGHT: 69 IN

## 2020-06-03 PROCEDURE — 99203 OFFICE O/P NEW LOW 30 MIN: CPT | Performed by: ORTHOPAEDIC SURGERY

## 2020-06-03 PROCEDURE — G8417 CALC BMI ABV UP PARAM F/U: HCPCS | Performed by: ORTHOPAEDIC SURGERY

## 2020-06-03 PROCEDURE — 3017F COLORECTAL CA SCREEN DOC REV: CPT | Performed by: ORTHOPAEDIC SURGERY

## 2020-06-03 PROCEDURE — G8427 DOCREV CUR MEDS BY ELIG CLIN: HCPCS | Performed by: ORTHOPAEDIC SURGERY

## 2020-06-03 PROCEDURE — 4004F PT TOBACCO SCREEN RCVD TLK: CPT | Performed by: ORTHOPAEDIC SURGERY

## 2020-06-03 RX ORDER — ASPIRIN 81 MG/1
81 TABLET, CHEWABLE ORAL DAILY
COMMUNITY

## 2020-06-03 RX ORDER — IBUPROFEN 800 MG/1
800 TABLET ORAL EVERY 6 HOURS PRN
Qty: 40 TABLET | Refills: 2 | Status: SHIPPED
Start: 2020-06-03 | End: 2021-04-12

## 2020-06-03 NOTE — PROGRESS NOTES
History:   TOBACCO:   reports that he has been smoking cigarettes. He has a 17.50 pack-year smoking history. He has never used smokeless tobacco.  ETOH:   reports no history of alcohol use. DRUGS:   reports previous drug use. Drug: Cocaine. ACTIVITIES OF DAILY LIVING:    OCCUPATION:    Family History:       Problem Relation Age of Onset    Heart Disease Father         64    Stroke Mother        REVIEW OF SYSTEMS:   Skin: no abnormal pigmentation, rash  Eyes: no blurring or eye pain   Ears/Nose/Throat: no hearing loss, tinnitus  Respiratory: No increased work of breathing, no coughing  Cardiovascular: Brisk capillary refill bilaterally, well perfused extremities  Gastrointestinal: no nausea, vomiting  Neurologic: no paralysis, or seizures  Musculoskeletal: Right shoulder pain      PHYSICAL EXAM:    VITALS:  Temp 97.3 °F (36.3 °C) (Infrared)   Ht 5' 9\" (1.753 m)   Wt 215 lb (97.5 kg)   BMI 31.75 kg/m²   Constitutional: Oriented to person, place, and time; Answer questions appropriately  HENT: Head: Normocephalic and atraumatic. Eyes: EOMI, DHARA  Neck: Supple, trachea midline  Cardiovascular: Brisk capillary refill to all extremities, extremities well perfused  Pulmonary/Chest: No increased work of breathing, no cough  Abdominal: Non-tender, non-distended  Neurologic:  Awake, alert and oriented in three planes.  No gross deficits   Musculoskeletal: Upper extremity right  · Skin intact  · Nonspecific tenderness palpation about the shoulder  · Compartment soft compressible  · +2 radial pulse  · Sensation intact light touch about median, radial, ulnar, axillary nerves  · Positive AIN, PIN, ulnar nerve function  · Forward flexion and 90 degrees actively before pain  · Positive Arabella's weakness  · Positive Brewster's  · External rotation at the side to 40 degrees  · Negative speeds  · No weakness with belly press  · Internal rotation to belt line  ·         DATA:    CBC:   Lab Results   Component Value Date    WBC 6.4 02/28/2019    RBC 4.33 02/28/2019    HGB 13.0 02/28/2019    HCT 40.1 02/28/2019    MCV 92.6 02/28/2019    MCH 30.0 02/28/2019    MCHC 32.4 02/28/2019    RDW 13.4 02/28/2019     02/28/2019    MPV 11.5 02/28/2019     Radiology Review:      No images taken today, previous studies x-ray right shoulder reviewed: No acute fractures or dislocations. Chronic degenerative changes present about the glenohumeral circulation however these are mild there are chronic calcifications about the insertion of the supraspinatus tendon at the footprint on the greater tuberosity. Surgical changes about the [facility] joint indicating a prior decompression    IMPRESSION:  Chronic right shoulder pain  Suspect subacute right traumatic rotator cuff tear secondary to fall    PLAN:  1. Had long discussion with patient about possible treatment options including possibility of injection versus MRI. Patient like to proceed with MRI we will discuss possible treatment options after those have resulted he is to call the office and see him back to go over results. Follow-up after results    Staff Addendum    I have seen and evaluated the patient and agree with the assessment and plan as documented by the orthopaedic surgery resident. I have performed the key components of the history and physical examination and concur with the findings and plan, and have made changes where appropriate/necessary. Agree with above. He has had shoulder pain after a fall several months ago which has not improved. I am concerned for traumatic rotator cuff tear.   We will obtain an MRI and see him back after results      Marco Priest MD  [facility] [employer]

## 2020-06-16 ENCOUNTER — TELEPHONE (OUTPATIENT)
Dept: ADMINISTRATIVE | Age: 65
End: 2020-06-16

## 2020-06-16 NOTE — TELEPHONE ENCOUNTER
I called Luis Carlos since he was on the call list for COLON. He said that he had a colonoscopy two years ago in [place], saying that he is not supposed to return for another for 5 years. He is not sure if when the next one is due if he will have it done her or in [place]. I did however, schedule patient for another reason.

## 2020-06-23 ENCOUNTER — HOSPITAL ENCOUNTER (OUTPATIENT)
Age: 65
Discharge: HOME | End: 2020-06-25
Payer: MEDICARE

## 2020-06-23 ENCOUNTER — OFFICE VISIT (OUTPATIENT)
Dept: FAMILY MEDICINE CLINIC | Age: 65
End: 2020-06-23
Payer: MEDICARE

## 2020-06-23 VITALS
DIASTOLIC BLOOD PRESSURE: 84 MMHG | HEIGHT: 69 IN | OXYGEN SATURATION: 98 % | WEIGHT: 216 LBS | HEART RATE: 83 BPM | SYSTOLIC BLOOD PRESSURE: 136 MMHG | RESPIRATION RATE: 18 BRPM | TEMPERATURE: 97.6 F | BODY MASS INDEX: 31.99 KG/M2

## 2020-06-23 LAB
BASOPHILS ABSOLUTE: 0.09 E9/L (ref 0–0.2)
BASOPHILS RELATIVE PERCENT: 1.1 % (ref 0–2)
EOSINOPHILS ABSOLUTE: 0.53 E9/L (ref 0.05–0.5)
EOSINOPHILS RELATIVE PERCENT: 6.6 % (ref 0–6)
HCT VFR BLD CALC: 45 % (ref 37–54)
HEMOGLOBIN: 14.1 G/DL (ref 12.5–16.5)
IMMATURE GRANULOCYTES #: 0.02 E9/L
IMMATURE GRANULOCYTES %: 0.3 % (ref 0–5)
LYMPHOCYTES ABSOLUTE: 3.72 E9/L (ref 1.5–4)
LYMPHOCYTES RELATIVE PERCENT: 46.5 % (ref 20–42)
MCH RBC QN AUTO: 29.8 PG (ref 26–35)
MCHC RBC AUTO-ENTMCNC: 31.3 % (ref 32–34.5)
MCV RBC AUTO: 95.1 FL (ref 80–99.9)
MONOCYTES ABSOLUTE: 0.7 E9/L (ref 0.1–0.95)
MONOCYTES RELATIVE PERCENT: 8.8 % (ref 2–12)
NEUTROPHILS ABSOLUTE: 2.94 E9/L (ref 1.8–7.3)
NEUTROPHILS RELATIVE PERCENT: 36.7 % (ref 43–80)
PDW BLD-RTO: 13.7 FL (ref 11.5–15)
PLATELET # BLD: 382 E9/L (ref 130–450)
PMV BLD AUTO: 11.3 FL (ref 7–12)
RBC # BLD: 4.73 E12/L (ref 3.8–5.8)
WBC # BLD: 8 E9/L (ref 4.5–11.5)

## 2020-06-23 PROCEDURE — 80061 LIPID PANEL: CPT

## 2020-06-23 PROCEDURE — G8427 DOCREV CUR MEDS BY ELIG CLIN: HCPCS | Performed by: PHYSICIAN ASSISTANT

## 2020-06-23 PROCEDURE — 99214 OFFICE O/P EST MOD 30 MIN: CPT | Performed by: PHYSICIAN ASSISTANT

## 2020-06-23 PROCEDURE — 4004F PT TOBACCO SCREEN RCVD TLK: CPT | Performed by: PHYSICIAN ASSISTANT

## 2020-06-23 PROCEDURE — 80053 COMPREHEN METABOLIC PANEL: CPT

## 2020-06-23 PROCEDURE — 85025 COMPLETE CBC W/AUTO DIFF WBC: CPT

## 2020-06-23 PROCEDURE — G8417 CALC BMI ABV UP PARAM F/U: HCPCS | Performed by: PHYSICIAN ASSISTANT

## 2020-06-23 PROCEDURE — 3017F COLORECTAL CA SCREEN DOC REV: CPT | Performed by: PHYSICIAN ASSISTANT

## 2020-06-23 RX ORDER — KETOROLAC TROMETHAMINE 30 MG/ML
30 INJECTION, SOLUTION INTRAMUSCULAR; INTRAVENOUS ONCE
Status: COMPLETED | OUTPATIENT
Start: 2020-06-23 | End: 2020-06-23

## 2020-06-23 RX ADMIN — KETOROLAC TROMETHAMINE 30 MG: 30 INJECTION, SOLUTION INTRAMUSCULAR; INTRAVENOUS at 15:59

## 2020-06-24 LAB
ALBUMIN SERPL-MCNC: 4.4 G/DL (ref 3.5–5.2)
ALP BLD-CCNC: 81 U/L (ref 40–129)
ALT SERPL-CCNC: 20 U/L (ref 0–40)
ANION GAP SERPL CALCULATED.3IONS-SCNC: 15 MMOL/L (ref 7–16)
AST SERPL-CCNC: 23 U/L (ref 0–39)
BILIRUB SERPL-MCNC: <0.2 MG/DL (ref 0–1.2)
BUN BLDV-MCNC: 19 MG/DL (ref 8–23)
CALCIUM SERPL-MCNC: 9.7 MG/DL (ref 8.6–10.2)
CHLORIDE BLD-SCNC: 104 MMOL/L (ref 98–107)
CHOLESTEROL, TOTAL: 304 MG/DL (ref 0–199)
CO2: 25 MMOL/L (ref 22–29)
CREAT SERPL-MCNC: 0.9 MG/DL (ref 0.7–1.2)
GFR AFRICAN AMERICAN: >60
GFR NON-AFRICAN AMERICAN: >60 ML/MIN/1.73
GLUCOSE BLD-MCNC: 90 MG/DL (ref 74–99)
HDLC SERPL-MCNC: 45 MG/DL
LDL CHOLESTEROL CALCULATED: 204 MG/DL (ref 0–99)
POTASSIUM SERPL-SCNC: 4.7 MMOL/L (ref 3.5–5)
SODIUM BLD-SCNC: 144 MMOL/L (ref 132–146)
TOTAL PROTEIN: 7.6 G/DL (ref 6.4–8.3)
TRIGL SERPL-MCNC: 273 MG/DL (ref 0–149)
VLDLC SERPL CALC-MCNC: 55 MG/DL

## 2020-06-26 RX ORDER — ATORVASTATIN CALCIUM 10 MG/1
10 TABLET, FILM COATED ORAL DAILY
Qty: 30 TABLET | Refills: 3 | Status: SHIPPED | OUTPATIENT
Start: 2020-06-26

## 2020-06-29 ENCOUNTER — HOSPITAL ENCOUNTER (OUTPATIENT)
Dept: MRI IMAGING | Age: 65
Discharge: HOME | End: 2020-07-01
Payer: MEDICARE

## 2020-06-29 PROCEDURE — 73221 MRI JOINT UPR EXTREM W/O DYE: CPT

## 2020-07-08 ENCOUNTER — OFFICE VISIT (OUTPATIENT)
Dept: ORTHOPEDIC SURGERY | Age: 65
End: 2020-07-08
Payer: MEDICARE

## 2020-07-08 VITALS — HEIGHT: 69 IN | WEIGHT: 214 LBS | TEMPERATURE: 97.6 F | BODY MASS INDEX: 31.7 KG/M2

## 2020-07-08 PROCEDURE — 3017F COLORECTAL CA SCREEN DOC REV: CPT | Performed by: ORTHOPAEDIC SURGERY

## 2020-07-08 PROCEDURE — 20610 DRAIN/INJ JOINT/BURSA W/O US: CPT | Performed by: ORTHOPAEDIC SURGERY

## 2020-07-08 PROCEDURE — G8427 DOCREV CUR MEDS BY ELIG CLIN: HCPCS | Performed by: ORTHOPAEDIC SURGERY

## 2020-07-08 PROCEDURE — 99213 OFFICE O/P EST LOW 20 MIN: CPT | Performed by: ORTHOPAEDIC SURGERY

## 2020-07-08 PROCEDURE — G8417 CALC BMI ABV UP PARAM F/U: HCPCS | Performed by: ORTHOPAEDIC SURGERY

## 2020-07-08 PROCEDURE — 4004F PT TOBACCO SCREEN RCVD TLK: CPT | Performed by: ORTHOPAEDIC SURGERY

## 2020-07-08 RX ORDER — LIDOCAINE HYDROCHLORIDE 10 MG/ML
4 INJECTION, SOLUTION INFILTRATION; PERINEURAL ONCE
Status: COMPLETED | OUTPATIENT
Start: 2020-07-08 | End: 2020-07-08

## 2020-07-08 RX ORDER — TRIAMCINOLONE ACETONIDE 40 MG/ML
40 INJECTION, SUSPENSION INTRA-ARTICULAR; INTRAMUSCULAR ONCE
Status: COMPLETED | OUTPATIENT
Start: 2020-07-08 | End: 2020-07-08

## 2020-07-08 RX ADMIN — LIDOCAINE HYDROCHLORIDE 4 ML: 10 INJECTION, SOLUTION INFILTRATION; PERINEURAL at 10:42

## 2020-07-08 RX ADMIN — TRIAMCINOLONE ACETONIDE 40 MG: 40 INJECTION, SUSPENSION INTRA-ARTICULAR; INTRAMUSCULAR at 10:42

## 2020-07-08 NOTE — PROGRESS NOTES
Assisted Dr. Annette Smith with injection of 4 cc lidocaine/1 cc kenalog in R shoulder. Patient tolerated procedure well. Verbal instructions were given.

## 2020-07-08 NOTE — PROGRESS NOTES
Follow Up Visit     Felicia Magallon returns today for follow up visit regarding Chronic right shoulder pain with history of 2 surgeries involving his distal clavicle in the remote past. Treatment thus far has included obtain further imaging, MRI, he is here today to review MRI results. He reports symptoms are unchanged. Physical Exam:     Height: 5' 9\" (1.753 m), Weight: 214 lb (97.1 kg)    General: Alert and oriented x3, no acute distress  Cardiovascular/pulmonary: No labored breathing, peripheral perfusion intact  Musculoskeletal:    On exam, his main issue is he continues to have tenderness in the vicinity of his previous scar over his AC joint. He also has what feels to almost be a mass a bit more proximally to this within the trapezius of unclear significance. He does have some mild pain with rotator cuff testing. Speed and Natrona's test are intact. Controlled Substances Monitoring:      Imaging:  MRI was reviewed which shows small full-thickness tear without retraction involving the anterior portion of the supraspinatus. There is also evidence of his previous surgeries. X-ray shows questionable soft tissue abnormality in the vicinity of the clavicle which could be previous surgical implant right shoulder pain, mainly over AC joint,      Assessment: History of AC joint surgery x2, MRI showing small probable full-thickness rotator cuff tear      Plan:   We discussed his shoulder today. Most of his pain still seems to be from [facility]. This is of unclear significance. He does have a palpable mass in the vicinity of this area which may correlate with x-rays showing possible radiopaque suture fixation device. We will try a steroid shot subacromial which will hopefully be diagnostic and therapeutic. If he does not improve we know his pain is coming more from [facility] joint and possibly retained hardware.   If he does improve then this would be more of an indicator that his rotator cuff pathology may be painful. He is in agreement with this plan.   Follow-up in 6 weeks    Omar Hurt MD  Orthopaedic Surgery   7/8/20  10:04 AM

## 2020-09-04 ENCOUNTER — OFFICE VISIT (OUTPATIENT)
Dept: FAMILY MEDICINE CLINIC | Age: 65
End: 2020-09-04
Payer: MEDICARE

## 2020-09-04 ENCOUNTER — TELEPHONE (OUTPATIENT)
Dept: FAMILY MEDICINE CLINIC | Age: 65
End: 2020-09-04

## 2020-09-04 VITALS
TEMPERATURE: 97.2 F | RESPIRATION RATE: 18 BRPM | DIASTOLIC BLOOD PRESSURE: 84 MMHG | WEIGHT: 213 LBS | HEIGHT: 69 IN | HEART RATE: 88 BPM | OXYGEN SATURATION: 96 % | BODY MASS INDEX: 31.55 KG/M2 | SYSTOLIC BLOOD PRESSURE: 136 MMHG

## 2020-09-04 PROCEDURE — 3017F COLORECTAL CA SCREEN DOC REV: CPT | Performed by: PHYSICIAN ASSISTANT

## 2020-09-04 PROCEDURE — 4040F PNEUMOC VAC/ADMIN/RCVD: CPT | Performed by: PHYSICIAN ASSISTANT

## 2020-09-04 PROCEDURE — G8417 CALC BMI ABV UP PARAM F/U: HCPCS | Performed by: PHYSICIAN ASSISTANT

## 2020-09-04 PROCEDURE — G8427 DOCREV CUR MEDS BY ELIG CLIN: HCPCS | Performed by: PHYSICIAN ASSISTANT

## 2020-09-04 PROCEDURE — 99214 OFFICE O/P EST MOD 30 MIN: CPT | Performed by: PHYSICIAN ASSISTANT

## 2020-09-04 PROCEDURE — 1123F ACP DISCUSS/DSCN MKR DOCD: CPT | Performed by: PHYSICIAN ASSISTANT

## 2020-09-04 PROCEDURE — 4004F PT TOBACCO SCREEN RCVD TLK: CPT | Performed by: PHYSICIAN ASSISTANT

## 2020-09-04 RX ORDER — LORATADINE 10 MG/1
10 TABLET ORAL DAILY
Qty: 30 TABLET | Refills: 0 | Status: SHIPPED | OUTPATIENT
Start: 2020-09-04 | End: 2020-10-04

## 2020-09-04 RX ORDER — FLUTICASONE PROPIONATE 50 MCG
1 SPRAY, SUSPENSION (ML) NASAL DAILY
Qty: 1 BOTTLE | Refills: 5 | Status: SHIPPED
Start: 2020-09-04 | End: 2021-01-29 | Stop reason: SDUPTHER

## 2020-09-04 RX ORDER — KETOROLAC TROMETHAMINE 30 MG/ML
30 INJECTION, SOLUTION INTRAMUSCULAR; INTRAVENOUS ONCE
Status: COMPLETED | OUTPATIENT
Start: 2020-09-04 | End: 2020-09-04

## 2020-09-04 RX ADMIN — KETOROLAC TROMETHAMINE 30 MG: 30 INJECTION, SOLUTION INTRAMUSCULAR; INTRAVENOUS at 14:31

## 2020-09-04 NOTE — PROGRESS NOTES
Pain:  Patient is here today with complaints of shoulder pain. This is a/an chronic problem. This has been going on for several years  Exacerbating factors include using the shoulder. Alleviating factors include resting . Pain is ache in nature. 8/10. Pain is  radiating. This has happen to patient before. Imaging to date includes MRI 6-. Therapy to date includes this year. Labs to date include none. Reviewed note from Dr Italo Maldonado. He was sent to [facility] twice, and never went. He wants to go to [facility]. He complains of clear rhinitis that is chronic. He wants an atb. No fever. No cough. Patient's past medical, surgical, social and/or family history reviewed, updated in chart, and are non-contributory (unless otherwise stated). Medications and allergies also reviewed and updated in chart.       Review of Systems:  Constitutional:  No fever, no fatigue, no chills, no headaches, no weight change  Dermatology:  No rash, no mole, no dry or sensitive skin  ENT:  No cough, no sore throat, no sinus pain, + runny nose, no ear pain  Cardiology:  No chest pain, no palpitations, no leg edema, no shortness of breath, no PND  Gastroenterology:  No dysphagia, no abdominal pain, no nausea, no vomiting, no constipation, no diarrhea, no heartburn  Musculoskeletal:  No joint pain, no leg cramps, + back pain, no muscle aches  Respiratory:  No shortness of breath, no orthopnea, no wheezing, no HAMMOND, no hemoptysis  Urology:  No blood in the urine, no urinary frequency, no urinary incontinence, no urinary urgency, no nocturia, no dysuria    Vitals:    09/04/20 1301 09/04/20 1311   BP: (!) 156/88 136/84   Site: Right Upper Arm Right Upper Arm   Position: Sitting Sitting   Cuff Size: Large Adult Large Adult   Pulse: 88    Resp: 18    Temp: 97.2 °F (36.2 °C)    TempSrc: Temporal    SpO2: 96%    Weight: 213 lb (96.6 kg)    Height: 5' 9\" (1.753 m)        Physical Exam  Constitutional:       General: He is not in acute distress. Appearance: He is well-developed. HENT:      Head: Normocephalic and atraumatic. Right Ear: External ear normal.      Left Ear: External ear normal.      Nose: Nose normal.   Eyes:      General: No scleral icterus. Conjunctiva/sclera: Conjunctivae normal.      Pupils: Pupils are equal, round, and reactive to light. Neck:      Musculoskeletal: Normal range of motion and neck supple. Thyroid: No thyromegaly. Cardiovascular:      Rate and Rhythm: Normal rate and regular rhythm. Heart sounds: Normal heart sounds. No murmur. Pulmonary:      Effort: Pulmonary effort is normal. No accessory muscle usage or respiratory distress. Breath sounds: Normal breath sounds. No wheezing. Musculoskeletal: Normal range of motion. Skin:     General: Skin is warm and dry. Findings: No rash. Neurological:      Mental Status: He is alert and oriented to person, place, and time. Deep Tendon Reflexes: Reflexes are normal and symmetric. Psychiatric:         Speech: Speech normal.         Behavior: Behavior normal.         Assessment/Plan:      Luis Carlos was seen today for shoulder pain and nicotine dependence. Diagnoses and all orders for this visit:    Chronic right-sided low back pain with right-sided sciatica  -     External Referral To Pain Clinic  -     ketorolac (TORADOL) injection 30 mg    Lumbar radiculopathy  -     External Referral To Pain Clinic    Chronic pain of right knee  -     External Referral To Pain Clinic    Seasonal allergies  -     loratadine (CLARITIN) 10 MG tablet; Take 1 tablet by mouth daily    Colon cancer screening  -     Cologuard (For External Results Only); Future    Other orders  -     fluticasone (FLONASE) 50 MCG/ACT nasal spray; 1 spray by Each Nostril route daily      As above. Call or go to ED immediately if symptoms worsen or persist.  Return if symptoms worsen or fail to improve. , or sooner if necessary.       Educational materials and/or home exercises printed for patient's review and were included in patient instructions on his/her After Visit Summary and given to patient at the end of visit. Counseled regarding above diagnosis, including possible risks and complications,  especially if left uncontrolled. Counseled regarding the possible side effects, risks, benefits and alternatives to treatment; patient and/or guardian verbalizes understanding, agrees, feels comfortable with and wishes to proceed with above treatment plan. Advised patient to call with any new medication issues, and read all Rx info from pharmacy to assure aware of all possible risks and side effects of medication before taking. Reviewed age and gender appropriate health screening exams and vaccinations. Advised patient regarding importance of keeping up with recommended health maintenance and to schedule as soon as possible if overdue, as this is important in assessing for undiagnosed pathology, especially cancer, as well as protecting against potentially harmful/life threatening disease. Patient and/or guardian verbalizes understanding and agrees with above counseling, assessment and plan. All questions answered. Meak Isaacs PA-C  9/4/2020    I have personally reviewed and updated the chief complaint, HPI, Past Medical, Family and Social History, as well as the above Review of Systems.

## 2020-09-04 NOTE — TELEPHONE ENCOUNTER
Last Appointment:  6/23/2020  Future Appointments   Date Time Provider Alise Megan   9/4/2020  4:30 PM Salma Espinal [facility]   9/16/2020 11:00 AM Doni Busby MD [facility]   9/25/2020 10:00 AM LAINA Matamoros [facility]       patient is requesting chantix to be called in to pharmacy

## 2020-09-16 ENCOUNTER — OFFICE VISIT (OUTPATIENT)
Dept: ORTHOPEDIC SURGERY | Age: 65
End: 2020-09-16
Payer: MEDICARE

## 2020-09-16 VITALS — HEIGHT: 69 IN | BODY MASS INDEX: 31.55 KG/M2 | TEMPERATURE: 97.3 F | WEIGHT: 213 LBS

## 2020-09-16 PROCEDURE — 4040F PNEUMOC VAC/ADMIN/RCVD: CPT | Performed by: ORTHOPAEDIC SURGERY

## 2020-09-16 PROCEDURE — 3017F COLORECTAL CA SCREEN DOC REV: CPT | Performed by: ORTHOPAEDIC SURGERY

## 2020-09-16 PROCEDURE — 20610 DRAIN/INJ JOINT/BURSA W/O US: CPT | Performed by: ORTHOPAEDIC SURGERY

## 2020-09-16 PROCEDURE — G8417 CALC BMI ABV UP PARAM F/U: HCPCS | Performed by: ORTHOPAEDIC SURGERY

## 2020-09-16 PROCEDURE — 99213 OFFICE O/P EST LOW 20 MIN: CPT | Performed by: ORTHOPAEDIC SURGERY

## 2020-09-16 PROCEDURE — 1123F ACP DISCUSS/DSCN MKR DOCD: CPT | Performed by: ORTHOPAEDIC SURGERY

## 2020-09-16 PROCEDURE — G8427 DOCREV CUR MEDS BY ELIG CLIN: HCPCS | Performed by: ORTHOPAEDIC SURGERY

## 2020-09-16 PROCEDURE — 4004F PT TOBACCO SCREEN RCVD TLK: CPT | Performed by: ORTHOPAEDIC SURGERY

## 2020-09-16 RX ORDER — TRIAMCINOLONE ACETONIDE 40 MG/ML
40 INJECTION, SUSPENSION INTRA-ARTICULAR; INTRAMUSCULAR ONCE
Status: COMPLETED | OUTPATIENT
Start: 2020-09-16 | End: 2020-09-16

## 2020-09-16 RX ORDER — LIDOCAINE HYDROCHLORIDE 10 MG/ML
4 INJECTION, SOLUTION INFILTRATION; PERINEURAL ONCE
Status: COMPLETED | OUTPATIENT
Start: 2020-09-16 | End: 2020-09-16

## 2020-09-16 RX ADMIN — TRIAMCINOLONE ACETONIDE 40 MG: 40 INJECTION, SUSPENSION INTRA-ARTICULAR; INTRAMUSCULAR at 13:01

## 2020-09-16 RX ADMIN — LIDOCAINE HYDROCHLORIDE 4 ML: 10 INJECTION, SOLUTION INFILTRATION; PERINEURAL at 13:00

## 2020-09-16 NOTE — PROGRESS NOTES
Follow Up Visit     Naheed Flowers returns today for follow up visit regarding History of right [facility] joint surgery x2, MRI showing possible small full-thickness rotator cuff tear. Treatment thus far has included obtained further imaging (MRI) and cortisone injection on 7/8/2020 with good improvement x 2-3 weeks. He reports symptoms are unchanged. Physical Exam:     Height: 5' 9\" (1.753 m), Weight: 213 lb (96.6 kg)    General: Alert and oriented x3, no acute distress  Cardiovascular/pulmonary: No labored breathing, peripheral perfusion intact  Musculoskeletal:    Right shoulder exam range of motion 140/60/iliac crest.  Generalized right shoulder and AC joint tenderness present on exam.  There is no swelling or deformity visualized on inspection. There is stiffness present on exam with elevation. Stiffness present with internal rotation. Speeds Jobes and O'Briens exams are positive for mild weakness and mild pain. Belly press exam is intact. Main issue with physical exam is tenderness over his previous scar at his distal clavicle resection    Controlled Substances Monitoring:      Imaging:  No new imaging obtained today. Procedure Note: Shoulder steroid injection     The Right shoulder was identified as the injection site. The risk and benefits of a cortisone injection were explained and the patient consented to the injection. Under sterile conditions, the subacromial space was injected from posterior approach with a mixture of 40 mg of Kenalog, 4 cc  1% Lidocaine without complication. A sterile bandage was applied.     Administrations This Visit     lidocaine 1 % injection 4 mL     Admin Date  09/16/2020 Action  Given Dose  4 mL Route  Intra-articular Administered By  Ilene Ryder RN          triamcinolone acetonide VIA [facility]) injection 40 mg     Admin Date  09/16/2020 Action  Given Dose  40 mg Route  Intra-articular Administered By  Ilene Ryder RN                  Assessment: Right shoulder pain over previous surgery site from distal clavicle resection    Plan: Today we discussed his right shoulder. Patient reports that the cortisone injection given in early July relieve symptoms mildly for about 3 weeks. He states that the symptoms are only reproducible when he raises his arm, and is comfortable when resting at his side. He reports that symptoms have returned to baseline. We discussed previously though if symptoms were not relieved that pain was likely stemming from [facility] joint. Patient was offered a AC joint cortisone injection today which he agreed to. This was performed by Dr. Zane Mar. Patient will follow-up in 3 months. Crystal Neal, [address] [facility]  Orthopedic Surgery   09/16/20  12:58 PM    Staff Addendum    I have seen and evaluated the patient and agree with the assessment and plan as documented by Crystal Neal CNP. I have performed the key components of the history and physical examination and concur with the findings and plan, and have made changes where appropriate/necessary. Agree with above. Unclear what is causing his level of pain currently. He had a large portion of his distal clavicle resected. There is no significant elevation of his clavicle. He is tender over this area. We discussed trial of steroid injection directly into this area. He was agreeable. We will check him back in a few months to see how he is doing.       Rickard Essex, MD  [address] [place] [address]

## 2020-09-16 NOTE — PROGRESS NOTES
Assisted Dr. Maxine Layton with injection of 4 cc lidocaine/1 cc kenalog in R shoulder. Patient tolerated procedure well. Verbal instructions were given.

## 2020-10-30 ENCOUNTER — HOSPITAL ENCOUNTER (OUTPATIENT)
Dept: PHYSICAL THERAPY | Age: 65
Setting detail: THERAPIES SERIES
Discharge: HOME | End: 2020-10-30
Payer: MEDICARE

## 2020-10-30 NOTE — PROGRESS NOTES
[address] [facility]                Phone: [phone]  Fax: [phone]    Physical Therapy  Cancellation/No-show Note  Patient Name:  Sebastian Teran  :  1955   Date:  10/30/2020    For today's appointment patient:  [x]  Cancelled  []  Rescheduled appointment  []  No-show     Reason given by patient:  []  Patient ill  []  Conflicting appointment  []  No transportation    []  Conflict with work  [x]  No reason given  []  Other:     Comments:  pt cancelled  initial evaluation. Rescheduled for next week. Electronically signed by:   Jud Landis

## 2020-11-02 ENCOUNTER — HOSPITAL ENCOUNTER (OUTPATIENT)
Dept: PHYSICAL THERAPY | Age: 65
Setting detail: THERAPIES SERIES
Discharge: HOME | End: 2020-11-02
Payer: MEDICARE

## 2020-11-02 PROCEDURE — 97161 PT EVAL LOW COMPLEX 20 MIN: CPT | Performed by: PHYSICAL THERAPIST

## 2020-11-02 ASSESSMENT — PAIN DESCRIPTION - DESCRIPTORS: DESCRIPTORS: ACHING;CONSTANT

## 2020-11-02 ASSESSMENT — PAIN DESCRIPTION - PAIN TYPE: TYPE: SURGICAL PAIN

## 2020-11-02 ASSESSMENT — PAIN - FUNCTIONAL ASSESSMENT: PAIN_FUNCTIONAL_ASSESSMENT: PREVENTS OR INTERFERES WITH ALL ACTIVE AND SOME PASSIVE ACTIVITIES

## 2020-11-02 ASSESSMENT — PAIN DESCRIPTION - LOCATION: LOCATION: SHOULDER

## 2020-11-02 ASSESSMENT — PAIN DESCRIPTION - ORIENTATION: ORIENTATION: RIGHT

## 2020-11-02 ASSESSMENT — PAIN SCALES - GENERAL: PAINLEVEL_OUTOF10: 9

## 2020-11-02 NOTE — PROGRESS NOTES
[facility]                Phone: [phone]   Fax: [phone]    Physical Therapy Daily Treatment Note  Date:  2020    Patient Name:  Valery Recinos    :  1955  MRN: [id number]    Evaluating therapist:  DEVONTE Rangel            (20)  Restrictions/Precautions:    Diagnosis:  s/p R shoulder sx   Treatment Diagnosis:    Insurance/Certification information:    Referring Physician:    Plan of care signed (Y/N):    Visit# / total visits:    Pain level: 9/10   Time In:  Time Out:    Subjective:      Exercises:  Exercise/Equipment Resistance/Repetitions Other comments   PROM all ranges R shoulder   10 min                                                                                                                                        Other Therapeutic Activities:      Home Exercise Program:      Manual Treatments:      Modalities:   IFC/ICE to R shoulder PRN    Timed Code Treatment Minutes: Total Treatment Minutes:      Treatment/Activity Tolerance:  [] Patient tolerated treatment well [] Patient limited by fatique  [] Patient limited by pain  [] Patient limited by other medical complications  [] Other:     Prognosis: [] Good [] Fair  [] Poor    Patient Requires Follow-up: [] Yes  [] No    Plan:   [] Continue per plan of care [] Alter current plan (see comments)  [] Plan of care initiated [] Hold pending MD visit [] Discharge  Plan for Next Session:      See Weekly Progress Note: []  Yes  []  No  Next due:        Electronically signed by:   Fabien De Jesus

## 2020-11-02 NOTE — PROGRESS NOTES
Physical Therapy  Initial Assessment  Date: 2020  Patient Name: Kym Diaz  MRN: [id number]  : 1955        Subjective   General  Chart Reviewed: Yes  Referring Practitioner: Cheri Camarillo  Referral Date : 10/20/20  Diagnosis: s/p R shoulder sx  PT Visit Information  Onset Date: 10/20/20  PT Insurance Information: [employer]  Total # of Visits Approved: 24  Total # of Visits to Date: 1  Subjective  Subjective: pt presents to therapy s/p R shoulder sx; at time of eval he has no script or protocol and does not remember specific procedure performed; no sling at time of eval; no c/o N/T or clicking/popping in his shoulder; pt is R handed; MES help with pain; sleep hampered due to aching; believes he is to return to surgeon for follow-up either this Thursday or Friday; PT advised pt to obtain a script for therapy with dx as well as any protocol surgeon prefers  Pain Screening  Patient Currently in Pain: Yes  Pain Assessment  Pain Assessment: 0-10  Pain Level: 9  Pain Type: Surgical pain  Pain Location: Shoulder  Pain Orientation: Right  Pain Descriptors: Aching;Constant  Functional Pain Assessment: Prevents or interferes with all active and some passive activities  Vital Signs  Patient Currently in Pain: Yes    Objective     Observation/Palpation  Observation: moderate swelling observed across ant/post poles of shoulder with stitches still intact    AROM RLE (degrees)  RLE AROM: WNL  AROM LLE (degrees)  LLE AROM : WNL  PROM RUE (degrees)  RUE General PROM: WNL for all ranges R elbow/wrist; R shoulder:  flex/abd= 120*/100*, IR/ER= 45*/40*  AROM LUE (degrees)  LUE AROM : WNL    Strength Other  Other: grossly 2-, 2/5 for all ranges R shoulder/RTC; grossly 4/5 across R elbow     Additional Measures  Other: endurance for all prolonged activities is FAIR  Sensation  Overall Sensation Status: WFL        Assessment   Activity Tolerance  Activity Tolerance: Patient limited by pain; Patient limited by endurance Plan   Plan  Times per week: pt to be seen 2x/week/10-12 weeks  Current Treatment Recommendations: ROM, Strengthening, Endurance Training, Modalities, Home Exercise Program  Plan Comment: POC will progress as per surgeon's protocol    Goals  Time Frame for goals: 10-12 weeks  goal 1: decrease pain across R shoulder with all prolonged activities, 0-4/10  goal 2: increase AROM R shoulder:  flex/abd= 140*/140*, IR/ER= L4/back of head  goal 3: increase strength across R shoulder to grossly 4-, 4/5 for all ranges  goal 4: improve endurance for all prolonged activities to GOOD-/GOOD  goal 5: assure I with HEP for home management of condition    Patient goals : to be able to use his arm normally again          Martínez Sargent, PT

## 2020-11-09 ENCOUNTER — HOSPITAL ENCOUNTER (OUTPATIENT)
Dept: PHYSICAL THERAPY | Age: 65
Setting detail: THERAPIES SERIES
Discharge: HOME | End: 2020-11-09
Payer: MEDICARE

## 2020-11-09 PROCEDURE — 97140 MANUAL THERAPY 1/> REGIONS: CPT

## 2020-11-09 NOTE — PROGRESS NOTES
[address] [facility]                Phone: [phone]   Fax: [phone]    Physical Therapy Daily Treatment Note  Date:  2020    Patient Name:  Selam Angel    :  1955  MRN: [id number]    Evaluating therapist:  DEVONTE Kaur            (20)  Restrictions/Precautions:    Diagnosis:  s/p R shoulder sx   Treatment Diagnosis:    Insurance/Certification information:    Referring Physician:    Plan of care signed (Y/N):    Visit# / total visits:    Pain level: 9/10   Time In:  1410  Time Out:1440    Subjective:    Pt reports R shoulder and L hip/ low back pain. Pt brought in script for therapy today with diagnosis code of shoulder strain. Pt continues to be unclear as to what surgery he had on R shoulder. Exercises:  Exercise/Equipment Resistance/Repetitions Other comments   PROM all ranges R shoulder   30 min  Supine                                                                                                                                       Comments:  Pt with muscle guarding noted with R shoulder PROM initially. Pt reports that his L low back and hip are very painful currently. Explained to pt that his script is only for his R shoulder. PT recommended pt speak with his physician regarding L hip/ low back pain.        Home Exercise Program:      Manual Treatments:      Modalities:   IFC/ICE to R shoulder PRN- NT     Timed Code Treatment Minutes:  30   Total Treatment Minutes:  30     Treatment/Activity Tolerance:  [] Patient tolerated treatment well [] Patient limited by fatique  [x] Patient limited by pain  [] Patient limited by other medical complications  [] Other:     Prognosis: [] Good [x] Fair  [] Poor    Patient Requires Follow-up: [x] Yes  [] No    Plan:   [x] Continue per plan of care [] Alter current plan (see comments)  [] Plan of care initiated [] Hold pending MD visit [] Discharge    Plan for Next Session:    Progress POC as tolerated See Weekly Progress Note: []  Yes  [x]  No  Next due:        Electronically signed by:    Arianna Mijares DPT  [id number]

## 2020-11-11 ENCOUNTER — APPOINTMENT (OUTPATIENT)
Dept: GENERAL RADIOLOGY | Age: 65
DRG: 179 | End: 2020-11-11
Payer: MEDICARE

## 2020-11-11 ENCOUNTER — HOSPITAL ENCOUNTER (INPATIENT)
Age: 65
LOS: 1 days | Discharge: HOME | DRG: 179 | End: 2020-11-13
Attending: EMERGENCY MEDICINE | Admitting: INTERNAL MEDICINE
Payer: MEDICARE

## 2020-11-11 LAB
ANION GAP SERPL CALCULATED.3IONS-SCNC: 11 MMOL/L (ref 7–16)
BASOPHILS ABSOLUTE: 0.06 E9/L (ref 0–0.2)
BASOPHILS RELATIVE PERCENT: 0.4 % (ref 0–2)
BUN BLDV-MCNC: 15 MG/DL (ref 8–23)
CALCIUM SERPL-MCNC: 9.1 MG/DL (ref 8.6–10.2)
CHLORIDE BLD-SCNC: 93 MMOL/L (ref 98–107)
CO2: 27 MMOL/L (ref 22–29)
CREAT SERPL-MCNC: 0.8 MG/DL (ref 0.7–1.2)
EOSINOPHILS ABSOLUTE: 0.22 E9/L (ref 0.05–0.5)
EOSINOPHILS RELATIVE PERCENT: 1.5 % (ref 0–6)
GFR AFRICAN AMERICAN: >60
GFR NON-AFRICAN AMERICAN: >60 ML/MIN/1.73
GLUCOSE BLD-MCNC: 135 MG/DL (ref 74–99)
HCT VFR BLD CALC: 35 % (ref 37–54)
HEMOGLOBIN: 11 G/DL (ref 12.5–16.5)
IMMATURE GRANULOCYTES #: 0.13 E9/L
IMMATURE GRANULOCYTES %: 0.9 % (ref 0–5)
LACTIC ACID: 1.2 MMOL/L (ref 0.5–2.2)
LYMPHOCYTES ABSOLUTE: 2.14 E9/L (ref 1.5–4)
LYMPHOCYTES RELATIVE PERCENT: 14.8 % (ref 20–42)
MCH RBC QN AUTO: 29.4 PG (ref 26–35)
MCHC RBC AUTO-ENTMCNC: 31.4 % (ref 32–34.5)
MCV RBC AUTO: 93.6 FL (ref 80–99.9)
MONOCYTES ABSOLUTE: 0.95 E9/L (ref 0.1–0.95)
MONOCYTES RELATIVE PERCENT: 6.6 % (ref 2–12)
NEUTROPHILS ABSOLUTE: 10.99 E9/L (ref 1.8–7.3)
NEUTROPHILS RELATIVE PERCENT: 75.8 % (ref 43–80)
PDW BLD-RTO: 13.6 FL (ref 11.5–15)
PLATELET # BLD: 636 E9/L (ref 130–450)
PMV BLD AUTO: 10.1 FL (ref 7–12)
POTASSIUM REFLEX MAGNESIUM: 4.5 MMOL/L (ref 3.5–5)
RBC # BLD: 3.74 E12/L (ref 3.8–5.8)
SODIUM BLD-SCNC: 131 MMOL/L (ref 132–146)
WBC # BLD: 14.5 E9/L (ref 4.5–11.5)

## 2020-11-11 PROCEDURE — 85651 RBC SED RATE NONAUTOMATED: CPT

## 2020-11-11 PROCEDURE — 80048 BASIC METABOLIC PNL TOTAL CA: CPT

## 2020-11-11 PROCEDURE — 6360000002 HC RX W HCPCS: Performed by: EMERGENCY MEDICINE

## 2020-11-11 PROCEDURE — 86140 C-REACTIVE PROTEIN: CPT

## 2020-11-11 PROCEDURE — 81001 URINALYSIS AUTO W/SCOPE: CPT

## 2020-11-11 PROCEDURE — 83605 ASSAY OF LACTIC ACID: CPT

## 2020-11-11 PROCEDURE — 84145 PROCALCITONIN (PCT): CPT

## 2020-11-11 PROCEDURE — 85025 COMPLETE CBC W/AUTO DIFF WBC: CPT

## 2020-11-11 PROCEDURE — 73030 X-RAY EXAM OF SHOULDER: CPT

## 2020-11-11 PROCEDURE — 73502 X-RAY EXAM HIP UNI 2-3 VIEWS: CPT

## 2020-11-11 RX ORDER — MORPHINE SULFATE 4 MG/ML
4 INJECTION, SOLUTION INTRAMUSCULAR; INTRAVENOUS ONCE
Status: COMPLETED | OUTPATIENT
Start: 2020-11-11 | End: 2020-11-11

## 2020-11-11 RX ADMIN — MORPHINE SULFATE 4 MG: 4 INJECTION, SOLUTION INTRAMUSCULAR; INTRAVENOUS at 23:28

## 2020-11-11 ASSESSMENT — ENCOUNTER SYMPTOMS
VOMITING: 0
COUGH: 0
TROUBLE SWALLOWING: 0
SHORTNESS OF BREATH: 0
BACK PAIN: 1
BLOOD IN STOOL: 0
NAUSEA: 0
ABDOMINAL PAIN: 0
COLOR CHANGE: 0
RHINORRHEA: 0
DIARRHEA: 0

## 2020-11-11 ASSESSMENT — PAIN DESCRIPTION - LOCATION: LOCATION: HIP

## 2020-11-11 ASSESSMENT — PAIN SCALES - GENERAL
PAINLEVEL_OUTOF10: 10
PAINLEVEL_OUTOF10: 10

## 2020-11-11 ASSESSMENT — PAIN DESCRIPTION - ORIENTATION: ORIENTATION: LEFT

## 2020-11-12 ENCOUNTER — APPOINTMENT (OUTPATIENT)
Dept: CT IMAGING | Age: 65
DRG: 179 | End: 2020-11-12
Payer: MEDICARE

## 2020-11-12 ENCOUNTER — APPOINTMENT (OUTPATIENT)
Dept: MRI IMAGING | Age: 65
DRG: 179 | End: 2020-11-12
Payer: MEDICARE

## 2020-11-12 ENCOUNTER — APPOINTMENT (OUTPATIENT)
Dept: GENERAL RADIOLOGY | Age: 65
DRG: 179 | End: 2020-11-12
Payer: MEDICARE

## 2020-11-12 PROBLEM — U07.1 COVID-19: Status: ACTIVE | Noted: 2020-11-12

## 2020-11-12 LAB
BACTERIA: NORMAL /HPF
BILIRUBIN URINE: NEGATIVE
BLOOD, URINE: NORMAL
C-REACTIVE PROTEIN: 15.3 MG/DL (ref 0–0.4)
CLARITY: CLEAR
COLOR: YELLOW
D DIMER: 1239 NG/ML DDU
GLUCOSE URINE: NEGATIVE MG/DL
KETONES, URINE: NEGATIVE MG/DL
LEUKOCYTE ESTERASE, URINE: NEGATIVE
NITRITE, URINE: NEGATIVE
PH UA: 7 (ref 5–9)
PROCALCITONIN: 0.09 NG/ML (ref 0–0.08)
PROTEIN UA: NEGATIVE MG/DL
RBC UA: NORMAL /HPF (ref 0–2)
SARS-COV-2, NAAT: DETECTED
SEDIMENTATION RATE, ERYTHROCYTE: 125 MM/HR (ref 0–15)
SPECIFIC GRAVITY UA: 1.01 (ref 1–1.03)
UROBILINOGEN, URINE: 0.2 E.U./DL
WBC UA: NORMAL /HPF (ref 0–5)

## 2020-11-12 PROCEDURE — A9579 GAD-BASE MR CONTRAST NOS,1ML: HCPCS | Performed by: STUDENT IN AN ORGANIZED HEALTH CARE EDUCATION/TRAINING PROGRAM

## 2020-11-12 PROCEDURE — 72197 MRI PELVIS W/O & W/DYE: CPT

## 2020-11-12 PROCEDURE — 6360000002 HC RX W HCPCS: Performed by: INTERNAL MEDICINE

## 2020-11-12 PROCEDURE — 36415 COLL VENOUS BLD VENIPUNCTURE: CPT

## 2020-11-12 PROCEDURE — 99285 EMERGENCY DEPT VISIT HI MDM: CPT

## 2020-11-12 PROCEDURE — 2580000003 HC RX 258: Performed by: INTERNAL MEDICINE

## 2020-11-12 PROCEDURE — 87040 BLOOD CULTURE FOR BACTERIA: CPT

## 2020-11-12 PROCEDURE — 6360000004 HC RX CONTRAST MEDICATION: Performed by: STUDENT IN AN ORGANIZED HEALTH CARE EDUCATION/TRAINING PROGRAM

## 2020-11-12 PROCEDURE — 71045 X-RAY EXAM CHEST 1 VIEW: CPT

## 2020-11-12 PROCEDURE — U0002 COVID-19 LAB TEST NON-CDC: HCPCS

## 2020-11-12 PROCEDURE — 96375 TX/PRO/DX INJ NEW DRUG ADDON: CPT

## 2020-11-12 PROCEDURE — 6370000000 HC RX 637 (ALT 250 FOR IP): Performed by: INTERNAL MEDICINE

## 2020-11-12 PROCEDURE — 96374 THER/PROPH/DIAG INJ IV PUSH: CPT

## 2020-11-12 PROCEDURE — 85378 FIBRIN DEGRADE SEMIQUANT: CPT

## 2020-11-12 PROCEDURE — 2580000003 HC RX 258: Performed by: EMERGENCY MEDICINE

## 2020-11-12 PROCEDURE — 2140000000 HC CCU INTERMEDIATE R&B

## 2020-11-12 PROCEDURE — 6360000004 HC RX CONTRAST MEDICATION: Performed by: RADIOLOGY

## 2020-11-12 PROCEDURE — 6360000002 HC RX W HCPCS: Performed by: EMERGENCY MEDICINE

## 2020-11-12 PROCEDURE — 72192 CT PELVIS W/O DYE: CPT

## 2020-11-12 PROCEDURE — 74177 CT ABD & PELVIS W/CONTRAST: CPT

## 2020-11-12 PROCEDURE — 70450 CT HEAD/BRAIN W/O DYE: CPT

## 2020-11-12 RX ORDER — ATORVASTATIN CALCIUM 10 MG/1
10 TABLET, FILM COATED ORAL DAILY
Status: DISCONTINUED | OUTPATIENT
Start: 2020-11-12 | End: 2020-11-13 | Stop reason: HOSPADM

## 2020-11-12 RX ORDER — ACETAMINOPHEN 650 MG/1
650 SUPPOSITORY RECTAL EVERY 6 HOURS PRN
Status: DISCONTINUED | OUTPATIENT
Start: 2020-11-12 | End: 2020-11-13 | Stop reason: HOSPADM

## 2020-11-12 RX ORDER — DEXAMETHASONE SODIUM PHOSPHATE 10 MG/ML
INJECTION, SOLUTION INTRAMUSCULAR; INTRAVENOUS
Status: DISPENSED
Start: 2020-11-12 | End: 2020-11-12

## 2020-11-12 RX ORDER — MORPHINE SULFATE 2 MG/ML
2 INJECTION, SOLUTION INTRAMUSCULAR; INTRAVENOUS EVERY 4 HOURS PRN
Status: DISCONTINUED | OUTPATIENT
Start: 2020-11-12 | End: 2020-11-13

## 2020-11-12 RX ORDER — DEXAMETHASONE SODIUM PHOSPHATE 10 MG/ML
8 INJECTION, SOLUTION INTRAMUSCULAR; INTRAVENOUS ONCE
Status: COMPLETED | OUTPATIENT
Start: 2020-11-12 | End: 2020-11-12

## 2020-11-12 RX ORDER — SODIUM CHLORIDE 0.9 % (FLUSH) 0.9 %
10 SYRINGE (ML) INJECTION PRN
Status: DISCONTINUED | OUTPATIENT
Start: 2020-11-12 | End: 2020-11-13 | Stop reason: HOSPADM

## 2020-11-12 RX ORDER — ASPIRIN 81 MG/1
81 TABLET, CHEWABLE ORAL DAILY
Status: DISCONTINUED | OUTPATIENT
Start: 2020-11-12 | End: 2020-11-13 | Stop reason: HOSPADM

## 2020-11-12 RX ORDER — 0.9 % SODIUM CHLORIDE 0.9 %
1000 INTRAVENOUS SOLUTION INTRAVENOUS ONCE
Status: COMPLETED | OUTPATIENT
Start: 2020-11-12 | End: 2020-11-12

## 2020-11-12 RX ORDER — DEXAMETHASONE SODIUM PHOSPHATE 10 MG/ML
6 INJECTION, SOLUTION INTRAMUSCULAR; INTRAVENOUS EVERY 24 HOURS
Status: DISCONTINUED | OUTPATIENT
Start: 2020-11-13 | End: 2020-11-12

## 2020-11-12 RX ORDER — KETOROLAC TROMETHAMINE 30 MG/ML
30 INJECTION, SOLUTION INTRAMUSCULAR; INTRAVENOUS EVERY 6 HOURS PRN
Status: DISCONTINUED | OUTPATIENT
Start: 2020-11-12 | End: 2020-11-13 | Stop reason: SDUPTHER

## 2020-11-12 RX ORDER — KETOROLAC TROMETHAMINE 30 MG/ML
15 INJECTION, SOLUTION INTRAMUSCULAR; INTRAVENOUS ONCE
Status: COMPLETED | OUTPATIENT
Start: 2020-11-12 | End: 2020-11-12

## 2020-11-12 RX ORDER — PANTOPRAZOLE SODIUM 40 MG/1
40 TABLET, DELAYED RELEASE ORAL
Status: DISCONTINUED | OUTPATIENT
Start: 2020-11-12 | End: 2020-11-13 | Stop reason: HOSPADM

## 2020-11-12 RX ORDER — POLYETHYLENE GLYCOL 3350 17 G/17G
17 POWDER, FOR SOLUTION ORAL DAILY PRN
Status: DISCONTINUED | OUTPATIENT
Start: 2020-11-12 | End: 2020-11-13 | Stop reason: HOSPADM

## 2020-11-12 RX ORDER — PROMETHAZINE HYDROCHLORIDE 25 MG/1
12.5 TABLET ORAL EVERY 6 HOURS PRN
Status: DISCONTINUED | OUTPATIENT
Start: 2020-11-12 | End: 2020-11-13 | Stop reason: HOSPADM

## 2020-11-12 RX ORDER — ACETAMINOPHEN 325 MG/1
650 TABLET ORAL EVERY 6 HOURS PRN
Status: DISCONTINUED | OUTPATIENT
Start: 2020-11-12 | End: 2020-11-13 | Stop reason: HOSPADM

## 2020-11-12 RX ORDER — SODIUM CHLORIDE 0.9 % (FLUSH) 0.9 %
10 SYRINGE (ML) INJECTION EVERY 12 HOURS SCHEDULED
Status: DISCONTINUED | OUTPATIENT
Start: 2020-11-12 | End: 2020-11-13 | Stop reason: HOSPADM

## 2020-11-12 RX ORDER — ONDANSETRON 2 MG/ML
4 INJECTION INTRAMUSCULAR; INTRAVENOUS EVERY 6 HOURS PRN
Status: DISCONTINUED | OUTPATIENT
Start: 2020-11-12 | End: 2020-11-13 | Stop reason: HOSPADM

## 2020-11-12 RX ORDER — HYDRALAZINE HYDROCHLORIDE 20 MG/ML
10 INJECTION INTRAMUSCULAR; INTRAVENOUS EVERY 6 HOURS PRN
Status: DISCONTINUED | OUTPATIENT
Start: 2020-11-12 | End: 2020-11-13 | Stop reason: HOSPADM

## 2020-11-12 RX ADMIN — IOPAMIDOL 90 ML: 755 INJECTION, SOLUTION INTRAVENOUS at 04:47

## 2020-11-12 RX ADMIN — PANTOPRAZOLE SODIUM 40 MG: 40 TABLET, DELAYED RELEASE ORAL at 08:17

## 2020-11-12 RX ADMIN — SODIUM CHLORIDE 1000 ML: 9 INJECTION, SOLUTION INTRAVENOUS at 02:26

## 2020-11-12 RX ADMIN — ENOXAPARIN SODIUM 40 MG: 40 INJECTION SUBCUTANEOUS at 20:15

## 2020-11-12 RX ADMIN — MORPHINE SULFATE 2 MG: 2 INJECTION, SOLUTION INTRAMUSCULAR; INTRAVENOUS at 23:34

## 2020-11-12 RX ADMIN — SODIUM CHLORIDE, PRESERVATIVE FREE 10 ML: 5 INJECTION INTRAVENOUS at 20:15

## 2020-11-12 RX ADMIN — MORPHINE SULFATE 2 MG: 2 INJECTION, SOLUTION INTRAMUSCULAR; INTRAVENOUS at 15:54

## 2020-11-12 RX ADMIN — MORPHINE SULFATE 2 MG: 2 INJECTION, SOLUTION INTRAMUSCULAR; INTRAVENOUS at 08:57

## 2020-11-12 RX ADMIN — SODIUM CHLORIDE, PRESERVATIVE FREE 10 ML: 5 INJECTION INTRAVENOUS at 08:58

## 2020-11-12 RX ADMIN — ONDANSETRON 4 MG: 2 INJECTION INTRAMUSCULAR; INTRAVENOUS at 15:54

## 2020-11-12 RX ADMIN — ATORVASTATIN CALCIUM 10 MG: 10 TABLET, FILM COATED ORAL at 08:17

## 2020-11-12 RX ADMIN — SODIUM CHLORIDE, PRESERVATIVE FREE 10 ML: 5 INJECTION INTRAVENOUS at 23:35

## 2020-11-12 RX ADMIN — GADOTERIDOL 20 ML: 279.3 INJECTION, SOLUTION INTRAVENOUS at 22:55

## 2020-11-12 RX ADMIN — ASPIRIN 81 MG CHEWABLE TABLET 81 MG: 81 TABLET CHEWABLE at 08:17

## 2020-11-12 RX ADMIN — KETOROLAC TROMETHAMINE 15 MG: 30 INJECTION, SOLUTION INTRAMUSCULAR at 00:45

## 2020-11-12 RX ADMIN — ONDANSETRON 4 MG: 2 INJECTION INTRAMUSCULAR; INTRAVENOUS at 08:58

## 2020-11-12 RX ADMIN — ENOXAPARIN SODIUM 40 MG: 40 INJECTION SUBCUTANEOUS at 08:17

## 2020-11-12 RX ADMIN — DEXAMETHASONE SODIUM PHOSPHATE 8 MG: 10 INJECTION, SOLUTION INTRAMUSCULAR; INTRAVENOUS at 00:44

## 2020-11-12 ASSESSMENT — PAIN DESCRIPTION - DESCRIPTORS
DESCRIPTORS: ACHING;CONSTANT;DISCOMFORT
DESCRIPTORS: CONSTANT;DISCOMFORT;SORE
DESCRIPTORS: CONSTANT;DISCOMFORT;DULL

## 2020-11-12 ASSESSMENT — PAIN DESCRIPTION - LOCATION
LOCATION: HIP

## 2020-11-12 ASSESSMENT — PAIN DESCRIPTION - ONSET
ONSET: ON-GOING

## 2020-11-12 ASSESSMENT — PAIN DESCRIPTION - PROGRESSION: CLINICAL_PROGRESSION: NOT CHANGED

## 2020-11-12 ASSESSMENT — PAIN SCALES - GENERAL
PAINLEVEL_OUTOF10: 10
PAINLEVEL_OUTOF10: 9
PAINLEVEL_OUTOF10: 10
PAINLEVEL_OUTOF10: 9
PAINLEVEL_OUTOF10: 7
PAINLEVEL_OUTOF10: 10

## 2020-11-12 ASSESSMENT — PAIN DESCRIPTION - ORIENTATION
ORIENTATION: LEFT

## 2020-11-12 ASSESSMENT — PAIN DESCRIPTION - PAIN TYPE
TYPE: ACUTE PAIN

## 2020-11-12 ASSESSMENT — PAIN - FUNCTIONAL ASSESSMENT: PAIN_FUNCTIONAL_ASSESSMENT: ACTIVITIES ARE NOT PREVENTED

## 2020-11-12 ASSESSMENT — PAIN DESCRIPTION - FREQUENCY
FREQUENCY: CONTINUOUS

## 2020-11-12 NOTE — ED NOTES
S/w ortho resident. States he was here to evaluate pt and pt was talking nonsensical. States he was talking about pandas. When this RN assessed this pt, pt was oriented but somnolent. Pt had been given morphine at 2330. State he will discuss with ED physician and will reevaluate.       Yves Garcia RN  11/12/20 2060

## 2020-11-12 NOTE — H&P
Elizabeth Vasquez M.D. History and Physical      CHIEF COMPLAINT: Left hip pain    Reason for Admission: COVID-19    History Obtained From: Patient/EMR    HISTORY OF PRESENT ILLNESS:      The patient is a 72 y.o. male of [address] PA-C with significant past medical history of recent shoulder surgery who presents with complaints of left hip pain and shoulder pain. CT abdomen and pelvis reveals elongated mass along the sciatic notch of left leg. patient was noted to have elevated sed rate and CRP. He was further evaluated and worked up for COVID-19. This test returned positive and therefore patient is admitted. He denies cough, fevers chills or shortness of breath. He does indicate he has been out and about without wearing a mask.   All labs personally reviewed CRP 15, sed rate 125, white count 15,000 with left shift  D-dimer is 1239  all imaging personally reviewed-prominence of cardiomediastinal silhouette and central vasculature, no mention of groundglass opacities    Past Medical History:        Diagnosis Date    Anxiety     Chronic right-sided low back pain with right-sided sciatica 3/21/2019    Elbow pain     Right hand pain      Past Surgical History:        Procedure Laterality Date    ARTHRODESIS      Right 3rd PIP joint    EPIDURAL STEROID INJECTION Right 4/11/2019    L5 S1 EPIDURAL STEROID INJECTION performed by Ramez Lawson DO at [address] [place] [address]      fusion    SHOULDER SURGERY      Right AC joint surgery         Medications Prior to Admission:    Medications Prior to Admission: fluticasone (FLONASE) 50 MCG/ACT nasal spray, 1 spray by Each Nostril route daily  atorvastatin (LIPITOR) 10 MG tablet, Take 1 tablet by mouth daily  aspirin 81 MG chewable tablet, Take 81 mg by mouth daily  ibuprofen (ADVIL;MOTRIN) 800 MG tablet, Take 1 tablet by mouth every 6 hours as needed for Pain  ibuprofen (ADVIL;MOTRIN) 600 MG tablet, Take 1 tablet by mouth every 6 rash  Neuro:  Cranial nerves 2-12 intact, no focal deficits  Vascular: Radial and pedal Pulses 2+  Breast: deferred  Rectal: deferred  Genitalia:  deferred      DATA:     Recent Labs     11/11/20  2316   WBC 14.5*   HGB 11.0*   *     Recent Labs     11/11/20  2316   *   K 4.5   BUN 15   CREATININE 0.8     No results for input(s): PROT, INR in the last 72 hours. No results for input(s): AST, ALT, ALKPHOS, BILIDIR, BILITOT in the last 72 hours. No results for input(s): BNP in the last 72 hours. No results for input(s): CKTOTAL, CKMB, CKMBINDEX, TROPONINI in the last 72 hours. ASSESSMENT:      Active Problems:    COVID-19  Resolved Problems:    * No resolved hospital problems.  *        PLAN:    Admit to telemetry for evaluation of COVID-19  Patient is not hypoxemic-98% on room air  IV Decadron can be discontinued as it will further Muddle leukocytosis picture  Elevated sed rate and CRP of unknown significance, procalcitonin 0.09  Check D-dimer-elevated to 1239-unclear if this is secondary to recent shoulder surgery, or thrombophilic state from [id number] is not dyspneic and not hypoxemic-will increase Lovenox therapy at 40 mg to twice daily  Infectious disease evaluation secondary to leukocytosis, elevated sed rate and CRP with low-grade fevers    Left leg pain after recent shoulder surgery with elongated mass at sciatic notch  Check left lower extremity ultrasound  Check D-dimer to rule out [id number]  Pain control  Orthopedics did evaluate the patient-discussed with orthopedics resident-MRI will be ordered for closer look at elongated mass on CT      DVT prophylaxis  PT OT  Discharge plan    Patience MD Seema  11/12/2020  9:58 AM

## 2020-11-12 NOTE — ED NOTES
Attempted to ambulate pt. Pt was able to get out of bed and take a few steps, but states his \"waist\" hurts. When asked if he felt OK to go home, pt states \"No. I don't feel like I'm ready. I have to take care of my niece\". Pt currently alert and oriented x 4. Pt awaiting dispo.       Chadd Rios RN  11/12/20 4678

## 2020-11-12 NOTE — ED NOTES
Bed: 10  Expected date:   Expected time:   Means of arrival:   Comments:  [address], RN  11/11/20 5727

## 2020-11-12 NOTE — ED PROVIDER NOTES
ED PROVIDER NOTE    Chief Complaint   Patient presents with    Hip Pain     left sided, no injury, chronic, shoulder sx 2 weeks ago       HPI:  11/11/20,   Time: 10:43 PM ABDULLAHI Gibson is a 72 y.o. male presenting to the ED for L hip pain. Chronic but worsening today. Constant throbbing pain, nonradiating, worse w/ movement, no associated numbness/weakness. Recent R shoulder arthroscopic surgery, R shoulder pain is improving. He came to the ED due to the L hip and L low back pain. Tolerating ambulation but w/ difficulty. No changes in bowel or bladder function. No urinary symptoms. No fever, chills, nausea, vomiting. No recent fall or injury. No recent illness. Chart review: hx of chronic pain. Reviewed OARRS--received 7 day supply of hydrocodone on 11/6/20    Review of Systems:     Review of Systems   Constitutional: Negative for appetite change, chills and fever. HENT: Negative for congestion, rhinorrhea and trouble swallowing. Eyes: Negative for visual disturbance. Respiratory: Negative for cough and shortness of breath. Cardiovascular: Negative for chest pain and leg swelling. Gastrointestinal: Negative for abdominal pain, blood in stool, diarrhea, nausea and vomiting. Genitourinary: Positive for flank pain. Negative for decreased urine volume, difficulty urinating, dysuria, frequency, hematuria and urgency. Musculoskeletal: Positive for arthralgias and back pain. Negative for myalgias, neck pain and neck stiffness. Skin: Negative for color change.    Neurological: Negative for dizziness, syncope, weakness, light-headedness, numbness and headaches.         --------------------------------------------- PAST HISTORY ---------------------------------------------  Past Medical History:   Past Medical History:   Diagnosis Date    Anxiety     Chronic right-sided low back pain with right-sided sciatica 3/21/2019    Elbow pain     Right hand pain        Past Surgical History:   Past Surgical History:   Procedure Laterality Date    ARTHRODESIS      Right 3rd PIP joint    EPIDURAL STEROID INJECTION Right 4/11/2019    L5 S1 EPIDURAL STEROID INJECTION performed by Kaela Lubin DO at [address]      fusion    SHOULDER SURGERY      Right AC joint surgery       Social History:   Social History     Socioeconomic History    Marital status:      Spouse name: None    Number of children: None    Years of education: None    Highest education level: None   Occupational History    None   Social Needs    Financial resource strain: None    Food insecurity     Worry: None     Inability: None    Transportation needs     Medical: None     Non-medical: None   Tobacco Use    Smoking status: Current Some Day Smoker     Packs/day: 0.50     Years: 35.00     Pack years: 17.50     Types: Cigarettes    Smokeless tobacco: Never Used   Substance and Sexual Activity    Alcohol use: No    Drug use: Not Currently     Types: Cocaine     Comment: denies    Sexual activity: None   Lifestyle    Physical activity     Days per week: None     Minutes per session: None    Stress: None   Relationships    Social connections     Talks on phone: None     Gets together: None     Attends Gnosticist service: None     Active member of club or organization: None     Attends meetings of clubs or organizations: None     Relationship status: None    Intimate partner violence     Fear of current or ex partner: None     Emotionally abused: None     Physically abused: None     Forced sexual activity: None   Other Topics Concern    None   Social History Narrative    None       Family History:   Family History   Problem Relation Age of Onset    Heart Disease Father         64    Stroke Mother        The patients home medications have been reviewed. Allergies:    Allergies   Allergen Reactions    Penicillins Other (See Comments)     Unknown reaction Reviewed   BASIC METABOLIC PANEL W/ REFLEX TO MG FOR LOW K - Abnormal; Notable for the following components:       Result Value    Sodium 131 (*)     Chloride 93 (*)     Glucose 135 (*)     All other components within normal limits   CBC WITH AUTO DIFFERENTIAL - Abnormal; Notable for the following components:    WBC 14.5 (*)     RBC 3.74 (*)     Hemoglobin 11.0 (*)     Hematocrit 35.0 (*)     MCHC 31.4 (*)     Platelets 279 (*)     Lymphocytes % 14.8 (*)     Neutrophils Absolute 10.99 (*)     All other components within normal limits   SEDIMENTATION RATE - Abnormal; Notable for the following components:    Sed Rate 125 (*)     All other components within normal limits   LACTIC ACID, PLASMA   URINALYSIS WITH MICROSCOPIC   C-REACTIVE PROTEIN       RADIOLOGY:  Interpreted personally and by Radiologist.  XR HIP 2-3 VW W PELVIS LEFT   Final Result   1. No acute findings. 2.  Left hip degenerative changes. XR SHOULDER RIGHT (MIN 2 VIEWS)   Final Result   Absence and deformity of the distal end of the right clavicle which may be   postsurgical or posttraumatic, unchanged since the prior examination. No acute fracture or dislocation. Surgical changes along the proximal right humerus. Irregularity along the greater tuberosity with may correlate with a history   of rotator cuff disease. CT PELVIS WO CONTRAST Additional Contrast? None    (Results Pending)         ------------------------- NURSING NOTES AND VITALS REVIEWED ---------------------------   The nursing notes within the ED encounter and vital signs as below have been reviewed by myself. BP (!) 149/80   Pulse 102   Temp 99.3 °F (37.4 °C)   Resp 18   SpO2 95%   Oxygen Saturation Interpretation: Normal    The patients available past medical records and past encounters were reviewed.         ------------------------------ ED COURSE/MEDICAL DECISION MAKING----------------------  Medications   0.9 % sodium chloride bolus (has no administration in time range)   morphine sulfate (PF) injection 4 mg (4 mg Intravenous Given 20 4731)   dexamethasone (PF) (DECADRON) injection 8 mg (8 mg Intravenous Given 20 0044)   ketorolac (TORADOL) injection 15 mg (15 mg Intravenous Given 20 0045)       Consultations:             Orthopedic surgery    Counseling: The emergency provider has spoken with the patient and discussed todays results, in addition to providing specific details for the plan of care and counseling regarding the diagnosis and prognosis. Questions are answered at this time and they are agreeable with the plan. ED Course/Medical Decision Makin y.o. male here with acute on chronic L hip pain. LLE strength testing limited due to pain, otherwise neurovascularly intact. Non-toxic appearing, afebrile, hemodynamically stable, and in no acute distress. Pain w/ ROM of L hip which patient reports is markedly worse from baseline. Not febrile but temp elevated, tachycardic, leukocytosis, and markedly elevated ESR concerning for possible septic arthritis vs other inflammatory arthropathy. CT pelvis pending at time of sign out to oncoming physician. Pain control given and patient still having significant pain. Ortho consulted and their evaluation is pending at time of sign out to oncoming physician.        --------------------------------- IMPRESSION AND DISPOSITION ---------------------------------    IMPRESSION  1. Lumbar radiculopathy    2. Left hip pain        DISPOSITION  Disposition: pending at time of sign out to oncoming physician  Patient condition is stable    NOTE: This report was transcribed using voice recognition software.  Every effort was made to ensure accuracy; however, inadvertent computerized transcription errors may be present    Floretta Hammans, MD  Attending Emergency Physician          Floretta Hammans, MD  [phone]

## 2020-11-12 NOTE — CONSULTS
Department of Orthopedic Surgery  Resident Consult Note    Reason for Consult: Left sided back and thigh pain    HISTORY OF PRESENT ILLNESS:       Patient is a 72 y.o. male who presents with complaint of left-sided back and thigh pain. History is was obtained from the patient. Patient frequently fell asleep during questioning regarding his symptoms. He states his pain began earlier today but he denies any history of trauma. He is been experiencing pain to the lateral aspect of his left thigh as well as lower back. He states that the pain has radiated down to the lateral aspect of his foot. He has been ambulatory. He denies any numbness or tingling. He denies any fevers, chills, nausea, or vomiting. Patient is established with Dr. Marah Salinas who has treated his right shoulder. He has no complaints about his shoulder at this time. Patient has no further orthopedic complaints this time. Past Medical History:        Diagnosis Date    Anxiety     Chronic right-sided low back pain with right-sided sciatica 3/21/2019    Elbow pain     Right hand pain      Past Surgical History:        Procedure Laterality Date    ARTHRODESIS      Right 3rd PIP joint    EPIDURAL STEROID INJECTION Right 4/11/2019    L5 S1 EPIDURAL STEROID INJECTION performed by Ivette Castañeda DO at [id number] [facility]      fusion    SHOULDER SURGERY      Right AC joint surgery     Current Medications:   Current Facility-Administered Medications: 0.9 % sodium chloride bolus, 1,000 mL, Intravenous, Once  Allergies:  Penicillins    Social History:     TOBACCO:   reports that he has been smoking cigarettes. He has a 17.50 pack-year smoking history. He has never used smokeless tobacco.  ETOH:   reports no history of alcohol use. DRUGS:   reports previous drug use. Drug: Cocaine.   ACTIVITIES OF DAILY LIVING:    OCCUPATION:      Family History:       Problem Relation Age of Onset    Heart Disease Father         64    Stroke Mother REVIEW OF SYSTEMS:  CONSTITUTIONAL:  negative for  fevers, chills  EYES:  negative for blurred vision, visual disturbance  HEENT:  negative for  hearing loss, voice change  RESPIRATORY:  negative for  dyspnea, wheezing  CARDIOVASCULAR:  negative for  chest pain, palpitations  GASTROINTESTINAL:  negative for nausea, vomiting  GENITOURINARY:  negative for frequency, urinary incontinence  HEMATOLOGIC/LYMPHATIC:  negative for bleeding and petechiae  MUSCULOSKELETAL: See HPI  NEUROLOGICAL:  negative for headaches, dizziness  BEHAVIOR/PSYCH:  negative for increased agitation and anxiety    PHYSICAL EXAM:    VITALS:  BP (!) 155/80   Pulse 88   Temp 98.5 °F (36.9 °C) (Oral)   Resp 18   SpO2 99%     CONSTITUTIONAL: Patient was arousable but frequently fell asleep during examination    MUSCULOSKELETAL:    Left lower Extremity:  · No gross deformities or obvious signs of trauma  · Tenderness to palpation on the lateral aspect of the thigh, just proximal to the greater trochanter  · Tenderness to palpation over the left sacroiliac joint  · No warmth noted to the hip region  · Minimal pain with internal and external rotation of the hip in both a extended and flexed position. Pain was noted at the extreme of internal rotation, and pain was located at the lateral aspect of the thigh. No resistance noted with manipulation of the hip. · Straight leg raise intact  · Pain localized to the left lower back with passive straight leg raise of the extremity  · No tenderness to palpation about the knee, ankle, or foot   · Motor function intact to gastrocsoleus, tibialis anterior, EHL, FHL, quadriceps  · Sensation intact in the tibial, saphenous, sural, deep peroneal, and superficial peroneal nerve distributions  · Dorsalis pedis and posterior tibial pulses intact   · Compartments are soft and compressible    Secondary Exam:   · rightUE: Surgical incisions evident about the right shoulder. Incisions are well-healed.   Tenderness to palpation about the shoulder. Range of motion limited by pain at this time. -TTP to fingers, hand, wrist, forearm, elbow, humerus. -- Patient able to flex/extend fingers, wrist, and elbow with active and passive ROM without pain, +2/4 Radial pulse, cap refill <3sec, +AIN/PIN/Radial/Ulnar/Median N, distal sensation grossly intact to C4-T1 dermatomes, compartments soft and compressible. · rightLE: No obvious signs of trauma. -TTP to foot, ankle, leg, knee, thigh, hip.-- Patient able to flex/extend toes, ankle, knee and hip with active and passive ROM without pain,+2/4 DP & PT pulses, cap refill <3sec, +5/5 PF/DF/EHL, distal sensation grossly intact to L4-S1 dermatomes, compartments soft and compressible. · Pelvis: -TTP, -Log roll, -Heel strike     DATA:    CBC:   Lab Results   Component Value Date    WBC 14.5 11/11/2020    RBC 3.74 11/11/2020    HGB 11.0 11/11/2020    HCT 35.0 11/11/2020    MCV 93.6 11/11/2020    MCH 29.4 11/11/2020    MCHC 31.4 11/11/2020    RDW 13.6 11/11/2020     11/11/2020    MPV 10.1 11/11/2020     PT/INR:    Lab Results   Component Value Date    PROTIME 10.9 09/02/2013    INR 0.9 09/02/2013       Radiology Review:  X-ray and CT imaging of the left hip were obtained revealing severe degenerative changes in the hip, sacroiliac joint, and visible portion of the lumbar spine. 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  Pain appears to be related to his degenerative disease in his lumbar spine as well as sacroiliac joints.   Aspiration with fluid analysis and culture would be the only way to definitively rule out septic arthritis of the

## 2020-11-12 NOTE — CONSULTS
[facility] CONSULT NOTE    Reason for Consult:  COVID-19 and concern for right shoulder septic arthritis  Requested by: Dr. Miesha Perkins    Chief complaint: Right shoulder and left hip pain    History Obtained From: Patient and EMR    HISTORY Randell              The patient is a 72 y.o. male with history of recent right shoulder surgery 2 weeks prior to admission, presented on 11/11 with right shoulder and left hip pain, incidentally found to be positive for SARS-CoV-2 PCR. On admission, he was afebrile, on room air and hemodynamically stable with leukocytosis of 14,000. Procalcitonin level was not elevated at 0.09 ng/mL. CRP was elevated at 15 mg/dL. Chest x-ray showed prominence of cardiomediastinal silhouette and central vasculature. 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 CT of the pelvis showed no acute osseous findings, degenerative changes of the lumbosacral spine, left greater than right sclerosis and degenerative changes of the bilateral sacroiliac joints, mild bilateral hip degenerative changes. Right shoulder x-ray showed absence and deformity of the distal end of the right clavicle which may be postsurgical, unchanged since that on 03/26, surgical changes along the proximal right humerus, irregularity along the greater tuberosity correlating with history of rotator cuff disease. He had been evaluated by Orthopedic Surgery who had deemed low suspicion for septic arthritis. ID service was subsequently consulted for further recommendations.     Past Medical History  Past Medical History:   Diagnosis Date    Anxiety     Chronic right-sided low back pain with right-sided sciatica 3/21/2019    Elbow pain     Right hand pain        Current Facility-Administered Medications   Medication Dose Route Frequency Provider Last Rate Last Dose    sodium chloride flush 0.9 % injection 10 mL  10 mL Intravenous 2 times per day Lacho Burnette MD   10 mL at 11/12/20 0858    sodium chloride flush 0.9 % injection 10 mL  10 mL Intravenous PRN Lacho Burnette MD        acetaminophen (TYLENOL) tablet 650 mg  650 mg Oral Q6H PRN Lacho Burnette MD        Or   Jameltre [place] Real acetaminophen (TYLENOL) suppository 650 mg  650 mg Rectal Q6H PRN Lacho Burnette MD        polyethylene glycol (GLYCOLAX) packet 17 g  17 g Oral Daily PRN Lacho Burnette MD        promethazine (PHENERGAN) tablet 12.5 mg  12.5 mg Oral Q6H PRN Lacho Burnetet MD        Or    ondansetron [facility]) injection 4 mg  4 mg Intravenous Q6H PRN Lacho Burnette MD   4 mg at 11/12/20 1554    ketorolac (TORADOL) injection 30 mg  30 mg Intravenous Q6H PRN Lacho Burnette MD        pantoprazole (PROTONIX) tablet 40 mg  40 mg Oral QAM AC Lacho Burntete MD   40 mg at 11/12/20 0817    hydrALAZINE (APRESOLINE) injection 10 mg  10 mg Intravenous Q6H PRN Lacho Burnette MD        aspirin chewable tablet 81 mg  81 mg Oral Daily Lacho Burnette MD   81 mg at 11/12/20 0817    atorvastatin (LIPITOR) tablet 10 mg  10 mg Oral Daily Lacho Burnette MD   10 mg at 11/12/20 0817    morphine (PF) injection 2 mg  2 mg Intravenous Q4H PRN Lacho Burnette MD   2 mg at 11/12/20 1554    dexamethasone (PF) (DECADRON) 10 MG/ML injection             enoxaparin (LOVENOX) injection 40 mg  40 mg Subcutaneous BID Antonio Cedeño MD           Allergies   Allergen Reactions    Penicillins Other (See Comments)     Unknown reaction                                                   Surgical History  Past Surgical History:   Procedure Laterality Date    ARTHRODESIS      Right 3rd PIP joint    EPIDURAL STEROID INJECTION Right 4/11/2019    L5 S1 EPIDURAL STEROID INJECTION performed by Tigist Arrington DO at [address] [facility]      fusion    SHOULDER SURGERY      Right [facility] joint surgery        Social History  Social History     Socioeconomic History    Marital status:    Tobacco Use    Smoking status: Current Some Day Smoker     Packs/day: 0.50     Years: 35.00     Pack years: 17.50     Types: Cigarettes    Smokeless tobacco: Never Used   Substance and Sexual Activity    Alcohol use: No    Drug use: Not Currently     Types: Cocaine     Comment: denies       Family Medical History  Family History   Problem Relation Age of Onset    Heart Disease Father         64    Stroke Mother        Review of Systems:  Constitutional: No fever, no chills  Eyes: No vision changes, no retroorbital pain  ENT: No hearing changes, no ear pain  Respiratory: No cough, no dyspnea  Cardiovascular: No chest pain, no palpitations  Gastrointestinal: No abdominal pain, no diarrhea  Genitourinary: No dysuria, no hematuria  Integumentary: No rash, no itching  Musculoskeletal: No muscle pain, has right shoulder and left hip pain  Neurologic: No headache, no numbness in extremities    Physical Examination:  Vitals:    11/12/20 0226 11/12/20 0624 11/12/20 0845 11/12/20 1207   BP: (!) 155/80 (!) 163/88 113/74 (!) 88/58   Pulse: 88 75 98 92   Resp: 18 19 20    Temp: 98.5 °F (36.9 °C) 98.5 °F (36.9 °C) 98.5 °F (36.9 °C)    TempSrc: Oral  Temporal    SpO2: 99% 98% 98%      Constitutional: Alert, not in distress  Eyes: Sclerae anicteric, no conjunctival erythema  ENT: No buccal lesion, no pharyngeal exudates  Neck: No nuchal rigidity, no cervical adenopathy  Lungs: Clear breath sounds, no crackles, no wheezes  Heart: Regular rate and rhythm, no murmurs  Abdomen: Bowel sounds present, soft, nontender  Skin: Warm and dry, no active dermatoses  Musculoskeletal: No joint erythema, no joint swelling    Labs, imaging, and medical records/notes were personally reviewed. Assessment:  COVID-19, asymptomatic  Right shoulder and left hip pain    Plan:  Monitor clinically off antibiotics for now. Defer arthrocentesis to Orthopedic Surgery. Monitor respiratory status. Continue supportive care.     Thank you for involving me in the care of Luis Carlos J Luis Mccauley. I will continue to follow. Please do not hesitate to call for any questions or concerns.     Electronically signed by Wei Alonzo MD on 11/12/2020 at 4:20 PM

## 2020-11-12 NOTE — ED PROVIDER NOTES
Patient signed out to me pending CT results and orthopedic consultation. There was some concern that he may have an infected hip due to his pain and elevated inflammatory markers. He was evaluated by orthopedics who thought he had a low likelihood of septic arthritis. I evaluated the patient and he had left lower quadrant and the flank tenderness on exam.  He seemed a bit confused and delirious. Septic work-up was pursued as well as CT imaging. CT head showed no acute abnormality. CAT scan of the abdomen showed no evidence of abscess or intra-abdominal infection. He was found to be COVID-19 positive. We attempted to ambulate him, however he was too weak and in too much pain to take more than a few steps. Patient was admitted for further management.      Ryan Bearden DO  11/12/20 6342

## 2020-11-12 NOTE — CARE COORDINATION
11/12 Transition of Care: Attempted to call patient in room. No answer. Per staff patient is alert and oriented. He presented to ER today due to hip pain. He was tested for covid and found to be positive. He does not have symptoms currenlty. Patient has been to multiple pain management offices for hip/back pain/shoulder. He is currently active with outpatient physical therapy for his shoulder with [facility] and was seen on 11/9/20. He is inconsistent with his appts. His pcp is ANG León and his pharmacy is listed as meds to beds. 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 Will await MRI results. PT/OT eval pending. Will follow for plan of care and any needs for discharge.   Mark Stiles RN CM

## 2020-11-13 VITALS
OXYGEN SATURATION: 96 % | SYSTOLIC BLOOD PRESSURE: 149 MMHG | HEART RATE: 93 BPM | WEIGHT: 201.8 LBS | DIASTOLIC BLOOD PRESSURE: 88 MMHG | TEMPERATURE: 100.3 F | BODY MASS INDEX: 29.89 KG/M2 | RESPIRATION RATE: 20 BRPM | HEIGHT: 69 IN

## 2020-11-13 PROCEDURE — 6370000000 HC RX 637 (ALT 250 FOR IP): Performed by: INTERNAL MEDICINE

## 2020-11-13 PROCEDURE — 6360000002 HC RX W HCPCS: Performed by: INTERNAL MEDICINE

## 2020-11-13 PROCEDURE — 97166 OT EVAL MOD COMPLEX 45 MIN: CPT

## 2020-11-13 PROCEDURE — 97530 THERAPEUTIC ACTIVITIES: CPT

## 2020-11-13 PROCEDURE — 2580000003 HC RX 258: Performed by: INTERNAL MEDICINE

## 2020-11-13 PROCEDURE — 97162 PT EVAL MOD COMPLEX 30 MIN: CPT

## 2020-11-13 RX ORDER — HYDROCODONE BITARTRATE AND ACETAMINOPHEN 7.5; 325 MG/1; MG/1
1 TABLET ORAL EVERY 6 HOURS PRN
Qty: 28 TABLET | Refills: 0 | Status: SHIPPED | OUTPATIENT
Start: 2020-11-13 | End: 2020-11-20

## 2020-11-13 RX ORDER — HYDROCODONE BITARTRATE AND ACETAMINOPHEN 7.5; 325 MG/1; MG/1
1 TABLET ORAL EVERY 6 HOURS PRN
Status: DISCONTINUED | OUTPATIENT
Start: 2020-11-13 | End: 2020-11-13 | Stop reason: HOSPADM

## 2020-11-13 RX ORDER — KETOROLAC TROMETHAMINE 30 MG/ML
15 INJECTION, SOLUTION INTRAMUSCULAR; INTRAVENOUS EVERY 6 HOURS PRN
Status: DISCONTINUED | OUTPATIENT
Start: 2020-11-13 | End: 2020-11-13 | Stop reason: HOSPADM

## 2020-11-13 RX ADMIN — ENOXAPARIN SODIUM 40 MG: 40 INJECTION SUBCUTANEOUS at 08:36

## 2020-11-13 RX ADMIN — SODIUM CHLORIDE, PRESERVATIVE FREE 10 ML: 5 INJECTION INTRAVENOUS at 03:37

## 2020-11-13 RX ADMIN — SODIUM CHLORIDE, PRESERVATIVE FREE 10 ML: 5 INJECTION INTRAVENOUS at 08:37

## 2020-11-13 RX ADMIN — HYDROCODONE BITARTRATE AND ACETAMINOPHEN 1 TABLET: 7.5; 325 TABLET ORAL at 12:27

## 2020-11-13 RX ADMIN — MORPHINE SULFATE 2 MG: 2 INJECTION, SOLUTION INTRAMUSCULAR; INTRAVENOUS at 03:37

## 2020-11-13 RX ADMIN — PANTOPRAZOLE SODIUM 40 MG: 40 TABLET, DELAYED RELEASE ORAL at 05:34

## 2020-11-13 RX ADMIN — ATORVASTATIN CALCIUM 10 MG: 10 TABLET, FILM COATED ORAL at 08:36

## 2020-11-13 RX ADMIN — ASPIRIN 81 MG CHEWABLE TABLET 81 MG: 81 TABLET CHEWABLE at 08:36

## 2020-11-13 RX ADMIN — ACETAMINOPHEN 650 MG: 325 TABLET ORAL at 11:41

## 2020-11-13 ASSESSMENT — PAIN DESCRIPTION - LOCATION: LOCATION: HIP

## 2020-11-13 ASSESSMENT — PAIN SCALES - GENERAL
PAINLEVEL_OUTOF10: 0
PAINLEVEL_OUTOF10: 10
PAINLEVEL_OUTOF10: 10

## 2020-11-13 ASSESSMENT — PAIN DESCRIPTION - FREQUENCY: FREQUENCY: CONTINUOUS

## 2020-11-13 ASSESSMENT — PAIN DESCRIPTION - DESCRIPTORS: DESCRIPTORS: ACHING;DISCOMFORT

## 2020-11-13 ASSESSMENT — PAIN DESCRIPTION - PROGRESSION: CLINICAL_PROGRESSION: GRADUALLY WORSENING

## 2020-11-13 ASSESSMENT — PAIN DESCRIPTION - ORIENTATION: ORIENTATION: LEFT

## 2020-11-13 ASSESSMENT — PAIN DESCRIPTION - ONSET: ONSET: ON-GOING

## 2020-11-13 ASSESSMENT — PAIN DESCRIPTION - PAIN TYPE: TYPE: ACUTE PAIN

## 2020-11-13 NOTE — CARE COORDINATION
11/13 Update CM note: Plan for possible dc home today per pcp if pain controlled. Follow up at [facility] for results of MRI. Will await discharge order per Dr Stephanie Schaefer.   Brigitte Virk RN CM

## 2020-11-13 NOTE — PROGRESS NOTES
Neurosurgery consult called. Voicemail left with Dr. Kat Wright regarding consult information.  Dr. Kat Wright added to [id number] [facility] [place], [place]

## 2020-11-13 NOTE — PROGRESS NOTES
KHARI PROGRESS NOTE      Chief complaint: Follow-up of COVID-19    The patient is a 72 y.o. male with history of recent right shoulder surgery 2 weeks prior to admission, presented on 11/11 with right shoulder and left hip pain, incidentally found to be positive for SARS-CoV-2 PCR. On admission, he was afebrile, on room air and hemodynamically stable with leukocytosis of 14,000. Procalcitonin level was not elevated at 0.09 ng/mL. CRP was elevated at 15 mg/dL. Chest x-ray showed prominence of cardiomediastinal silhouette and central vasculature. 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 CT of the pelvis showed no acute osseous findings, degenerative changes of the lumbosacral spine, left greater than right sclerosis and degenerative changes of the bilateral sacroiliac joints, mild bilateral hip degenerative changes. Right shoulder x-ray showed absence and deformity of the distal end of the right clavicle which may be postsurgical, unchanged since that on 03/26, surgical changes along the proximal right humerus, irregularity along the greater tuberosity correlating with history of rotator cuff disease. He had been evaluated by Orthopedic Surgery who had deemed low suspicion for septic arthritis. 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 ID service was subsequently consulted for further recommendations. Subjective: Patient was seen and examined. No chills, no abdominal pain, no diarrhea, no rash, no itching. He continues to have on his left hip and remains on room air.       Objective:    Vitals:    11/13/20 0835   BP: 126/73   Pulse: 92   Resp: 20

## 2020-11-13 NOTE — PROGRESS NOTES
MRI results reviewed and was significant for tumor of the S3 nerve root. Plan to consult Neurosurgery for further evaluation and treatment options. We appreciate neurosurgeries input and recommendations.

## 2020-11-13 NOTE — PROGRESS NOTES
Subjective: The patient is awake and alert. No acute events overnight. Denies chest pain, angina, SOB   Still having severe pain in left lower back / hip area-10 out of 10 especially on movement    Objective:    /73   Pulse 92   Temp 98.1 °F (36.7 °C) (Temporal)   Resp 20   Ht 5' 9\" (1.753 m)   Wt 201 lb 12.8 oz (91.5 kg)   SpO2 96%   BMI 29.80 kg/m²     In: 1080 [P.O.:1080]  Out: -   In: 1080   Out: -     General appearance: NAD, conversant  HEENT: AT/NC, MMM  Neck: FROM, supple  Lungs: Clear to auscultation  CV: RRR, no MRGs  Vasc: Radial pulses 2+  Abdomen: Soft, non-tender; no masses or HSM  Extremities: Left lower extremity decreased range of motion secondary to pain  Skin: no rash, lesions or ulcers  Psych: Alert and oriented to person, place and time  Neuro: Alert and interactive     Recent Labs     11/11/20  2316   WBC 14.5*   HGB 11.0*   HCT 35.0*   *       Recent Labs     11/11/20  2316   *   K 4.5   CL 93*   CO2 27   BUN 15   CREATININE 0.8   CALCIUM 9.1       Assessment:    Active Problems:    COVID-19  Resolved Problems:    * No resolved hospital problems.  *      Plan:    Admit to telemetry for evaluation of COVID-19  Patient is not hypoxemic-98% on room air  IV Decadron can be discontinued as it will further Muddle leukocytosis picture  Elevated sed rate and CRP of unknown significance, procalcitonin 0.09  Check D-dimer-elevated to 1239-unclear if this is secondary to recent shoulder surgery, or thrombophilic state from [id number] is not dyspneic and not hypoxemic-will increase Lovenox therapy at 40 mg to twice daily  Infectious disease evaluation secondary to leukocytosis, elevated sed rate and CRP with low-grade fevers-appreciate input     Left leg pain after recent shoulder surgery with elongated mass at sciatic notch  Check left lower extremity ultrasound  Check D-dimer to rule out PE.-[id number]  Pain control  Orthopedics did evaluate the patient-discussed with orthopedics resident  Of IV morphine, placed on Toradol every 6 as needed n.p.o. 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 Margot Garcia recommends followup in [facility] or [facility] for this nerve sheath tumor. Discussed with patient who opted to be discharged and f/u with dr. Alyson Romberg who will arrange follow up in [facility].  No immediate urgency to transfer ,     DVT Prophylaxis   PT/OT  Discharge planning           Araceli Chung MD  10:08 AM  11/13/2020

## 2020-11-13 NOTE — PROGRESS NOTES
Neurosurgery Attending    Discussed with Dr. 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 If pain can be controlled, he can be discharged home and [facility]. If pain cannot be controlled he will need to be transferred to [facility] for evaluation as I do not manage these lesions.     Lina Hadley

## 2020-11-13 NOTE — PROGRESS NOTES
Physical Therapy  Physical Therapy Initial Assessment     Name: Maryjo Oneal  : 1955  MRN: [id number]    Referring Provider: Urbano Trivedi MD    Date of Service: 2020    Evaluating PT: Jann Ben, PT, DPT, [id number]    Room #: 7497/2872-B  Diagnosis: [id number]  PMHx/PSHx: Anxiety, chronic pain  Pertinent Information: Recent R shoulder AC joint surgery  Precautions: Fall risk, Droplet Plus Isolation (COVID-19), continuous pulse ox, NWB R UE (per patient), WBAT L LE, impulsive, alarm    SUBJECTIVE:    Pt lives alone in a single story basement apartment unit. 5 stairs and 2 rails to apartment level. Pt ambulated without AD Independent prior to admission. Pt was active with [facility] outpatient PT s/p R shoulder surgery. Pt reports he has been NWB since procedure. OBJECTIVE:   Initial Evaluation  Date: 20 Treatment Date: Short Term/ Long Term   Goals   AM-PAC 6 Clicks      Was pt agreeable to Eval/treatment? Yes     Does pt have pain? 10/10 R shoulder and L LE pain; RN aware     Bed Mobility  Rolling: NT  Supine to sit: NT  Sit to supine: NT  Scooting: NT  Rolling: Independent   Supine to sit: Independent   Sit to supine: Independent   Scooting: Independent    Transfers Sit to stand: SBA  Stand to sit: SBA  Stand pivot: SBA without AD  Sit to stand: Independent   Stand to sit: Independent   Stand pivot: Mod Independent with AAD   Ambulation   20 feet x2 without AD with SBA  200 feet with AAD Mod Independent    Stair negotiation: ascended and descended NT  5 step(s) with 1 rail(s) Mod Independent    ROM BUE: Refer to OT note  BLE: WFL     Strength BUE: Refer to OT note  BLE: WFL     Balance Sitting EOB: NT  Dynamic Standing: SBA without AD  Sitting EOB: Independent   Dynamic Standing: Mod Independent with AAD     Pt is A & O x: 4 to person, place, month/year, and situation. Sensation: Pt denies numbness and tingling of extremities. Edema: Unremarkable.     Patient education  Pt educated on safety with all mobility. Patient response to education:   Pt verbalized understanding Pt demonstrated skill Pt requires further education in this area   Yes No  Yes     ASSESSMENT:    Vitals on RA:  With activity: O2 sat 97%, HR 88 bpm    Comments:   Pt was seated in bedside chair attempting to stand/ambulate without assistance. Pt was instructed to have assistance at all times due to unsteadiness/safety concern. Pt appeared restless/anxious and marched in place. Pt reported \"I can't sit still I have to move. \". Pt stood for 5 minutes at bedside chair and requested to return to bed. Pt completed stand pivot transfer and returned to supine. O2 sat remained within normal limits with all activity. Pt was instructed to call for assistance when he wishes to ambulate within room. Plan is for PT to return in PM to complete assessment. RN notified. PT returned in PM. Pt was found attempting to ambulate into hallway from Droplet Plus Isolation. Pt was educated on precautions and was instructed to return to room immediately as he is in isolation. Pt ambulated back to chair and was seated. Gait was slow, unsteady, and antalgic. Pt expressed concern regarding discharge home stating \"I'm in too much pain. How do they expect me to return home? \". PT explained that discharge is ordered by physician and to defer all questions regarding subject to Dr. Luan Sandy. PT role was explained. Pt was unwilling to ambulate around room due to fatigue and pain and declined further activity. PT role in discharge process was again reinforced however pt continued to decline stating \"I want to go to rehab, I can't go home anyway\". Pt was unable to be encouraged. Pt was left seated in chair with all needs met at conclusion of session. RN and CM were notified of pt's performance and concerns.     Treatment:  Patient practiced and was instructed in the following treatment:     Therapeutic activities: Pt completed all therapeutic activities noted above. Vitals and symptoms were skillfully monitored with all activity and during rest breaks. Pt was cued for safety when ambulating and pivoting in close quarters. Pt's/family goals:   1. To go to rehab. Patient and or family understand(s) diagnosis, prognosis, and plan of care. Yes. PLAN:    Current Treatment Recommendations   [x] Strengthening     [] ROM   [x] Balance Training   [x] Endurance Training   [x] Transfer Training   [x] Gait Training   [x] Stair Training   [] Positioning   [] Safety and Education Training   [] Patient/Caregiver Education   [] HEP  [] Other     PT care will be provided in accordance with the objectives noted above. Exercises and functional mobility practice will be used as well as appropriate assistive devices or modalities to obtain goals. Patient and family education will also be administered as needed. Frequency of treatments: 2-5x/week x 2-3 days. Time in: 1040  Time out: 1050    Time in: 1300  Time out: 1310    Total Treatment Time 15 minutes     Evaluation Time includes thorough review of current medical information, gathering information on past medical history/social history and prior level of function, completion of standardized testing/informal observation of tasks, assessment of data and education on plan of care and goals.     CPT codes:  [] Low Complexity PT evaluation 98288  [x] Moderate Complexity PT evaluation 17335  [] High Complexity PT evaluation 51887  [] PT Re-evaluation 67678  [] Gait training 66211 0 minutes  [] Manual therapy 69800 0 minutes  [x] Therapeutic activities 76384 15 minutes  [] Therapeutic exercises 57898 0 minutes  [] Neuromuscular reeducation 31663 0 minutes     Rosebud Polio, PT, DPT  [id number]

## 2020-11-13 NOTE — PROGRESS NOTES
Dr. Ellie Yousif called and notified patient would like to speak with her in regards to discharge and as of now patient stated he is \"refusing\" discharge.  called and notified as well.   Neftaly Johnson RN

## 2020-11-13 NOTE — PROGRESS NOTES
Occupational Therapy  OCCUPATIONAL THERAPY INITIAL EVALUATION      Date:2020  Patient Name: Genaro Cadet  MRN: [id number]  : 1955  Room: [address] [place], [place] [address]    Evaluating OT: Juan A Franks OTR/DELLA #[id number]    Referring physician: Courtney France MD   AM-PAC Daily Activity Raw Score: 1624  Recommended Adaptive Equipment: TBD     Reason for Admission/Diagnosis: COVID-19+, L hip pain  Pertinent Medical History/Surgery: anxiety, chronic R sided low back pain with R sided sciatica, elbow pain, R hand pain, L5 and S1 epidural steroid injection, spine, R AC joint surgery     Precautions: Droplet plus (COVID-19),  Falls, tele, L LE WBAT, recent R shoulder surgery (will maintain R UE nwb until further clarification)     Home Living: Pt lives with his niece in an lower level apartment with 5 steps down with railing. Laundry located on the same level. Bathroom setup: Walk-in with shower chair, elevated toilet seat   Equipment owned: none  Prior Level of Function:   I with ADLs ,  I with IADLs. Patient was using no device for functional mobility. 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 Per chart, patient has been participating in outpatient PT due to recent R shoulder surgery. Patient unable to provide additional information regarding R shoulder precautions.    Driving: yes                             Occupation: not stated  [place] [employer], exercising    Pain Level: 10/10 L hip  Cognition: A&O: self:yes, place:yes, time: month and year, situation:yes  Follows 1 step directions with continuous cues for re-direction and to attend to task   Memory:  fair    Sequencing:  fair    Problem solving:  poor   Judgement/safety:  poor     Functional Assessment:   Initial Eval Status  Date: 20 Treatment Status  Date: Short Term Goals=LTG  Treatment frequency: PRN 1-3 x/week   Feeding Set-up/Stand by Assist   Modified Chambers    Grooming Minimal Assist   Modified Chambers     UB Dressing Minimal Assist   Donned call light and phone within reach, along with all lines and tubes intact. Patient oriented to call button with patient verbalizing good understanding. Treatment: OT treatment provided this date includes:    Mobility training-  Instruction/training on safety and improved independence with bed mobility with min A supine>sit and verbal cues for technique. Supervision sit<>stand and min A to complete functional mobility in room and bed>chair. Verbal cues for safety, pace, and pursed lip breathing secondary to patient impulsivity and decreased awareness of environment.   Skilled positioning/alignment-  Proper Positioning/Alignment with R UE positioned for skin/joint protection    Skilled monitoring of vitals-  Skilled monitoring of the patient's response throughout treatment. Vitals: On RA  Rest: O2 sat 96%, HR 86 bpm, RR 13  Sitting edge of bed: O2 sat 89%, HR 90 bpm, RR 19  In chair : O2 sat 95%, HR 98 bpm, RR 20  In chair at end of session: O2 sat 96%, HR 92 bpm, RR 16        Patient would  benefit from continued skilled OT  to maximize functional outcomes as they relate to I/ADLs and functional mobility. Eval Complexity: Mod  · Evaluation Complexity: Mod Complexity  ? History: Expanded review of medical records and additional review of physical, cognitive, or psychosocial history related to current functional performance  ? Exam: 5+ performance deficits  ? Assistance/Modification: mod/max assistance or modifications required to perform tasks. May have comorbidities that affect occupational performance.       Assessment of current deficits   Functional mobility [x]  ADLs [x] Strength [x]  Cognition [x]  Functional transfers  [x] IADLs [x] Safety Awareness [x]  Endurance/Activity tolerance [x]  Fine Motor Coordination [] Balance [x] Vision/perception [x] Sensation []   Gross Motor Coordination [] ROM [x] Delirium []                  Motor Control []    Plan of Care:  OT 1-3x/week for 1-2 weeks PRN   [x] ADL retraining/AE recommendations to maximize patient safety and performance with self-care   [x] Energy Conservation Techniques/Strategies      [] Neuromuscular Re-Education     [x] Functional Transfer Training to maximize safety utilizing LRD          [x] Functional Mobility Training to maximize safety utilizing LRD       [x] Cognitive Re-Training to maximize patient safety        [x] Splinting/Positioning Needs           [x] Therapeutic Activity to improve activity tolerance for functional activities and ADLs    [x]Therapeutic Exercise to improve UE strength for functional transfers and ADLs   [] Visual/Perceptual   [x] Delirium Prevention/Treatment to maximize functional outcomes  [x] Positioning to Improve Functional [place], Safety, and Skin Integrity   [x] Patient and/or Family Education to Increase Safety and Functional [place]   [] Other:     Rehab Potential: Good for established goals    Patient / Family Goal: To reduce pain in L hip     Evaluation time includes thorough review of current medical information, gathering information on past medical & social history & PLOF, completion of standardized testing, informal observation of tasks, consultation with other medical professions/disciplines, assessment of data & development of POC/goals. Patient and/or family were instructed diagnosis, prognosis/goals and plan of care. yes Demonstrated fair understanding. Time In: 10:00  Time Out: 10:30  Total Treatment Time: 30 minutes   Min Units   OT Eval Low 49112     OT Eval Medium 97166 X 1   OT Eval High 32518     OT Re-Eval [id number]     Therapeutic Ex 41096     Therapeutic Activities 49692 15 1   ADL/Self Care 49475     Orthotic Management 94843     Neuro Re-Ed 44009     Non-Billable Time     TOTAL TIMED TREATMENT 15      [] Malnutrition indicators have been identified and nursing has been notified to ensure a dietitian consult is ordered.       Mod OT Evaluation + 15  treatment minutes    Chanel Moy Vanna Daigle, OTR/L #[id number]

## 2020-11-17 LAB
BLOOD CULTURE, ROUTINE: NORMAL
CULTURE, BLOOD 2: NORMAL

## 2020-11-20 ENCOUNTER — TELEPHONE (OUTPATIENT)
Dept: FAMILY MEDICINE CLINIC | Age: 65
End: 2020-11-20

## 2020-11-20 NOTE — TELEPHONE ENCOUNTER
Patient called requesting a medication for chest congestion/cough (+COVID 11/12). He also asked for an antibiotic and I explained this would not be appropriate.  Patient is established with Rony Tay PA-C (saw Dr. Chichi Brothers 1x 5/19/20)

## 2020-11-21 RX ORDER — BROMPHENIRAMINE MALEATE, PSEUDOEPHEDRINE HYDROCHLORIDE, AND DEXTROMETHORPHAN HYDROBROMIDE 2; 30; 10 MG/5ML; MG/5ML; MG/5ML
5 SYRUP ORAL 4 TIMES DAILY PRN
Qty: 240 ML | Refills: 0 | Status: SHIPPED | OUTPATIENT
Start: 2020-11-21 | End: 2020-12-21

## 2020-11-24 ENCOUNTER — OFFICE VISIT (OUTPATIENT)
Dept: FAMILY MEDICINE CLINIC | Age: 65
End: 2020-11-24
Payer: MEDICARE

## 2020-11-24 VITALS
TEMPERATURE: 97.5 F | SYSTOLIC BLOOD PRESSURE: 120 MMHG | OXYGEN SATURATION: 100 % | DIASTOLIC BLOOD PRESSURE: 68 MMHG | HEIGHT: 69 IN | WEIGHT: 206 LBS | HEART RATE: 83 BPM | RESPIRATION RATE: 18 BRPM | BODY MASS INDEX: 30.51 KG/M2

## 2020-11-24 PROCEDURE — 3017F COLORECTAL CA SCREEN DOC REV: CPT | Performed by: PHYSICIAN ASSISTANT

## 2020-11-24 PROCEDURE — 99213 OFFICE O/P EST LOW 20 MIN: CPT | Performed by: PHYSICIAN ASSISTANT

## 2020-11-24 PROCEDURE — 4040F PNEUMOC VAC/ADMIN/RCVD: CPT | Performed by: PHYSICIAN ASSISTANT

## 2020-11-24 PROCEDURE — G8484 FLU IMMUNIZE NO ADMIN: HCPCS | Performed by: PHYSICIAN ASSISTANT

## 2020-11-24 PROCEDURE — G8427 DOCREV CUR MEDS BY ELIG CLIN: HCPCS | Performed by: PHYSICIAN ASSISTANT

## 2020-11-24 PROCEDURE — 1111F DSCHRG MED/CURRENT MED MERGE: CPT | Performed by: PHYSICIAN ASSISTANT

## 2020-11-24 PROCEDURE — 4004F PT TOBACCO SCREEN RCVD TLK: CPT | Performed by: PHYSICIAN ASSISTANT

## 2020-11-24 PROCEDURE — G8417 CALC BMI ABV UP PARAM F/U: HCPCS | Performed by: PHYSICIAN ASSISTANT

## 2020-11-24 PROCEDURE — 1123F ACP DISCUSS/DSCN MKR DOCD: CPT | Performed by: PHYSICIAN ASSISTANT

## 2020-11-24 NOTE — PROGRESS NOTES
Chief Complaint   Patient presents with    Cough       HPI: Patient complains of some occasional cough. He got the bromfed dm that I called in for him, and when he has used it, it has helped. He denies cp or sob. He is not having fevers. He wants an antibiotic, and seems to ask me that often. He believes he needs it in case he gets pneumonia. Again, explained why this is a bad idea. He is feeling much better. Patient's past medical, surgical, social and/or family history reviewed, updated in chart, and are non-contributory (unless otherwise stated). Medications and allergies also reviewed and updated in chart. Review of Systems:  Constitutional:  No fever, no fatigue, no chills, no headaches, no weight change  Dermatology:  No rash, no mole, no dry or sensitive skin  ENT: + cough, no sore throat, no sinus pain, no runny nose, no ear pain  Cardiology:  No chest pain, no palpitations, no leg edema, no shortness of breath, no PND  Gastroenterology:  No dysphagia, no abdominal pain, no nausea, no vomiting, no constipation, no diarrhea, no heartburn  Musculoskeletal:  No joint pain, no leg cramps, no back pain, no muscle aches  Respiratory:  No shortness of breath, no orthopnea, no wheezing, no HAMMOND, no hemoptysis  Urology:  No blood in the urine, no urinary frequency, no urinary incontinence, no urinary urgency, no nocturia, no dysuria    Vitals:    11/24/20 1543   BP: 120/68   Pulse: 83   Resp: 18   Temp: 97.5 °F (36.4 °C)   TempSrc: Infrared   SpO2: 100%   Weight: 206 lb (93.4 kg)   Height: 5' 9\" (1.753 m)       Physical Exam  Constitutional:       General: He is not in acute distress. Appearance: He is well-developed. HENT:      Head: Normocephalic and atraumatic. Right Ear: External ear normal.      Left Ear: External ear normal.      Nose: Nose normal.   Eyes:      General: No scleral icterus.      Conjunctiva/sclera: Conjunctivae normal.      Pupils: Pupils are equal, round, and Consent: The patient's consent was obtained including but not limited to risks of crusting, scabbing, blistering, scarring, darker or lighter pigmentary change, recurrence, incomplete removal and infection. Render Post-Care Instructions In Note?: yes reactive to light. Neck:      Musculoskeletal: Normal range of motion and neck supple. Thyroid: No thyromegaly. Cardiovascular:      Rate and Rhythm: Normal rate and regular rhythm. Heart sounds: Normal heart sounds. No murmur. Pulmonary:      Effort: Pulmonary effort is normal. No accessory muscle usage or respiratory distress. Breath sounds: Normal breath sounds. No wheezing. Musculoskeletal: Normal range of motion. Right lower leg: No edema. Left lower leg: No edema. Skin:     General: Skin is warm and dry. Findings: No rash. Neurological:      Mental Status: He is alert and oriented to person, place, and time. Deep Tendon Reflexes: Reflexes are normal and symmetric. Psychiatric:         Speech: Speech normal.         Behavior: Behavior normal.         Assessment/Plan:      Luis Carlos was seen today for cough. Diagnoses and all orders for this visit:    COVID-19    Cough      As above. Call or go to ED immediately if symptoms worsen or persist.  Return if symptoms worsen or fail to improve. , or sooner if necessary. Educational materials and/or home exercises printed for patient's review and were included in patient instructions on his/her After Visit Summary and given to patient at the end of visit. Counseled regarding above diagnosis, including possible risks and complications,  especially if left uncontrolled. Counseled regarding the possible side effects, risks, benefits and alternatives to treatment; patient and/or guardian verbalizes understanding, agrees, feels comfortable with and wishes to proceed with above treatment plan. Advised patient to call with any new medication issues, and read all Rx info from pharmacy to assure aware of all possible risks and side effects of medication before taking. Reviewed age and gender appropriate health screening exams and vaccinations.   Advised patient regarding importance of keeping up with recommended Detail Level: Detailed Post-Care Instructions: I reviewed with the patient in detail post-care instructions. Patient is to wear sunprotection, and avoid picking at any of the treated lesions. Pt may apply Vaseline to crusted or scabbing areas. Number Of Freeze-Thaw Cycles: 2 freeze-thaw cycles Duration Of Freeze Thaw-Cycle (Seconds): 10

## 2020-11-25 ENCOUNTER — TELEPHONE (OUTPATIENT)
Dept: FAMILY MEDICINE CLINIC | Age: 65
End: 2020-11-25

## 2020-11-25 NOTE — TELEPHONE ENCOUNTER
Wants a referral:  Dr Tang Meyer ph [phone]    Please put in by  Next week    He said Happy Thanksgiving

## 2020-11-30 ENCOUNTER — TELEPHONE (OUTPATIENT)
Dept: FAMILY MEDICINE CLINIC | Age: 65
End: 2020-11-30

## 2020-11-30 NOTE — TELEPHONE ENCOUNTER
They told him this bc of the mri finding and the abnormality on the peripheral nerve sheath, referral placed

## 2020-12-01 ENCOUNTER — TELEPHONE (OUTPATIENT)
Dept: FAMILY MEDICINE CLINIC | Age: 65
End: 2020-12-01

## 2020-12-01 ENCOUNTER — HOSPITAL ENCOUNTER (EMERGENCY)
Age: 65
Discharge: HOME | End: 2020-12-01
Payer: MEDICARE

## 2020-12-01 VITALS
DIASTOLIC BLOOD PRESSURE: 88 MMHG | SYSTOLIC BLOOD PRESSURE: 148 MMHG | BODY MASS INDEX: 30.51 KG/M2 | HEART RATE: 88 BPM | WEIGHT: 206 LBS | HEIGHT: 69 IN | RESPIRATION RATE: 18 BRPM | TEMPERATURE: 97.8 F | OXYGEN SATURATION: 97 %

## 2020-12-01 PROCEDURE — U0003 INFECTIOUS AGENT DETECTION BY NUCLEIC ACID (DNA OR RNA); SEVERE ACUTE RESPIRATORY SYNDROME CORONAVIRUS 2 (SARS-COV-2) (CORONAVIRUS DISEASE [COVID-19]), AMPLIFIED PROBE TECHNIQUE, MAKING USE OF HIGH THROUGHPUT TECHNOLOGIES AS DESCRIBED BY CMS-2020-01-R: HCPCS

## 2020-12-01 PROCEDURE — 99283 EMERGENCY DEPT VISIT LOW MDM: CPT

## 2020-12-01 RX ORDER — HYDROCODONE BITARTRATE AND ACETAMINOPHEN 5; 325 MG/1; MG/1
1 TABLET ORAL EVERY 4 HOURS PRN
Qty: 18 TABLET | Refills: 0 | Status: SHIPPED | OUTPATIENT
Start: 2020-12-01 | End: 2020-12-01 | Stop reason: SDUPTHER

## 2020-12-01 RX ORDER — HYDROCODONE BITARTRATE AND ACETAMINOPHEN 5; 325 MG/1; MG/1
1 TABLET ORAL EVERY 6 HOURS PRN
Qty: 20 TABLET | Refills: 0 | Status: SHIPPED | OUTPATIENT
Start: 2020-12-01 | End: 2020-12-06

## 2020-12-01 ASSESSMENT — PAIN SCALES - GENERAL: PAINLEVEL_OUTOF10: 9

## 2020-12-01 ASSESSMENT — PAIN DESCRIPTION - PAIN TYPE: TYPE: CHRONIC PAIN

## 2020-12-01 ASSESSMENT — PAIN DESCRIPTION - FREQUENCY: FREQUENCY: INTERMITTENT

## 2020-12-01 ASSESSMENT — PAIN DESCRIPTION - LOCATION: LOCATION: HIP

## 2020-12-01 ASSESSMENT — PAIN DESCRIPTION - DESCRIPTORS: DESCRIPTORS: ACHING

## 2020-12-01 NOTE — ED PROVIDER NOTES
[facility]  Department of Emergency Medicine   ED  Encounter Note  Admit Date/RoomTime: 2020 11:20 AM  ED Room: [place] [address]    NAME: Marizol Zambrano  : 1955  MRN: [id number]     Chief Complaint:  Other (PATIENT STATES \" I WAS TOLD WHEN I CAME IN FOR AN MRI WAS POSITIVE FOR COVID, BUT NEVER HAD COVID TEST DONE\"; DENIES ANY S/SX OF COVID) and Hip Pain (ABNORMAL MRI RESULTS STATES \" FOUND TUMOR ON LEFT HIP AND NEEDS SURGERY; INCREASED PAIN)    History of Present Illness       Marizol Zambrano is a 72 y.o. old male who presents to the emergency department by private vehicle, for non-traumatic left hip pain which occured 1 month(s) prior to arrival.  The pain was result of no known cause. Patient  has a prior history of pain to mentioned area related to today's visit. Since onset the symptoms have been stable. His pain is aggraveated by any movement, relieved by nothing and associated with trouble weight bearing. Tetanus Status: up to date. There has been no history of head injury, loss of consciousness, neck pain, chest pain, abdominal pain, extremity injury, numbness or weakness. 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 Patient states at that time he was checked for Covid in which she was positive. He has not been able to see any of his doctors or physicians or physical therapy due to his positive test.  He is requesting a new test in hopes that it would be negative so he can continue his therapy as well as follow-up with his tumor. ROS   Pertinent positives and negatives are stated within HPI, all other systems reviewed and are negative. Past Medical History:  has a past medical history of Anxiety, Chronic right-sided low back pain with right-sided sciatica, Elbow pain, and Right hand pain. Surgical History:  has a past surgical history that includes shoulder surgery; arthrodesis;  Finger surgery; and epidural steroid limp.  Integument:  No abrasions, ecchymoses, or lacerations unless noted elsewhere. Neurological:  Orientation age-appropriate. Motor functions intact. Lab / Imaging Results   (All laboratory and radiology results have been personally reviewed by myself)  Labs:  Results for orders placed or performed during the hospital encounter of 12/01/20   Covid-19 Ambulatory   Result Value Ref Range    Source NP swab      Imaging: All Radiology results interpreted by Radiologist unless otherwise noted. No orders to display     ED Course / Medical Decision Making   Medications - No data to display     Re-examination:  12/1/20       Time:        Consult(s):   None    Procedure(s):   none    MDM:   Patient is a 27-year-old male came to the emergency department due to left-sided hip pain as well as a rule out COVID-19 test.  Patient appears well nontoxic. He will be discharged at this time and does have a pending COVID-19 test.  He was also given pain medication to last him until he can follow-up with his physicians. I did inform him if any symptoms worsen his return to emergency room immediately. I did inform patient that due to the fact that he does have a pending test he does need to self quarantine until his test comes back negative. Counseling:    Myself discussed today's visit with the patient in addition to providing specific details for the plan of care and counseling regarding the diagnosis and prognosis. Questions are answered at this time and they are agreeable with the plan. Assessment      1. Left hip pain      Plan   Discharge to home  Patient condition is good    New Medications     Discharge Medication List as of 12/1/2020 11:41 AM      START taking these medications    Details   HYDROcodone-acetaminophen (NORCO) 5-325 MG per tablet Take 1 tablet by mouth every 4 hours as needed for Pain for up to 3 days. Intended supply: 3 days.  Take lowest dose possible to manage pain, Disp-18 tablet,R-0Print Electronically signed by RADHA Meade NP   DD: 12/1/20  **This report was transcribed using voice recognition software. Every effort was made to ensure accuracy; however, inadvertent computerized transcription errors may be present.   END OF ED PROVIDER NOTE     RADHA Euceda NP  12/01/20 2011

## 2020-12-01 NOTE — TELEPHONE ENCOUNTER
[facility] type [facility]    ty

## 2020-12-03 LAB
SARS-COV-2: NOT DETECTED
SOURCE: NORMAL

## 2020-12-04 NOTE — PROGRESS NOTES
Pt called surgeon's office to request prescription for therapy with specific dx/surgical procedure as well as any protocol the surgeon may want us to follow for rehab.

## 2020-12-07 ENCOUNTER — OFFICE VISIT (OUTPATIENT)
Dept: ORTHOPEDIC SURGERY | Age: 65
End: 2020-12-07
Payer: MEDICARE

## 2020-12-07 VITALS — TEMPERATURE: 98.4 F | WEIGHT: 206 LBS | HEIGHT: 69 IN | BODY MASS INDEX: 30.51 KG/M2

## 2020-12-07 PROCEDURE — 1123F ACP DISCUSS/DSCN MKR DOCD: CPT | Performed by: NURSE PRACTITIONER

## 2020-12-07 PROCEDURE — 1111F DSCHRG MED/CURRENT MED MERGE: CPT | Performed by: NURSE PRACTITIONER

## 2020-12-07 PROCEDURE — G8428 CUR MEDS NOT DOCUMENT: HCPCS | Performed by: NURSE PRACTITIONER

## 2020-12-07 PROCEDURE — 99213 OFFICE O/P EST LOW 20 MIN: CPT | Performed by: NURSE PRACTITIONER

## 2020-12-07 PROCEDURE — G8417 CALC BMI ABV UP PARAM F/U: HCPCS | Performed by: NURSE PRACTITIONER

## 2020-12-07 PROCEDURE — G8484 FLU IMMUNIZE NO ADMIN: HCPCS | Performed by: NURSE PRACTITIONER

## 2020-12-07 PROCEDURE — 4040F PNEUMOC VAC/ADMIN/RCVD: CPT | Performed by: NURSE PRACTITIONER

## 2020-12-07 PROCEDURE — 3017F COLORECTAL CA SCREEN DOC REV: CPT | Performed by: NURSE PRACTITIONER

## 2020-12-07 PROCEDURE — 4004F PT TOBACCO SCREEN RCVD TLK: CPT | Performed by: NURSE PRACTITIONER

## 2020-12-07 NOTE — PROGRESS NOTES
Follow Up Visit     Mana Talbert returns today for follow up visit regarding right shoulder pain and left hip pain. Patient states that he underwent surgery on his right shoulder 1 month ago in [facility]. with a different orthopedic surgeon. He reports that he is following up with that surgeon regarding his right shoulder pain in 2 weeks. Patient states that he was in the hospital about a month ago for left hip pain. He follows up today regarding imaging results. States he has continual lower back and buttocks pain on the left side. Physical Exam:     Height: 5' 9\" (1.753 m), Weight: 206 lb (93.4 kg)    General: Alert and oriented x3, no acute distress  Cardiovascular/pulmonary: No labored breathing, peripheral perfusion intact  Musculoskeletal:    Left hip exam mild pain is present in the lower back and buttocks with range of motion: internal, external rotation, Abduction, adduction. No pain elicited in groin on physical exam.  Mild pain and weakness on straight leg raise exam and lower back present. Melita Shlomo and FADIR exams were negative for pain in groin. No swelling or deformity visualized on inspection. No tenderness over the greater trochanter to palpation. Moderate tenderness over the SI joint to palpation. Controlled Substances Monitoring:      Imaging:  No new imaging was obtained today. Previous imaging was reviewed. 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 Assessment: low back pain. Plan: Today we discussed his left hip and back. Patient followed up today for MRI results. Discussed with patient that MRI findings were suggestive of a tumor in his back. Patient states he was aware and is following up with [facility] with neurosurgery as recommended from his hospital admission.   After reviewing MRI and patient's physical exam there is no current treatable orthopedic problem. Patient instructed on how to obtain his MRI results from the hospital so that he may take them with him to the [facility]. He will follow-up with Tresa for his right shoulder. Patient verbalized understanding. He will follow-up as needed.       Paula Muller, [address] [facility]  Orthopaedic Surgery   12/7/20  3:54 PM

## 2020-12-08 ENCOUNTER — HOSPITAL ENCOUNTER (OUTPATIENT)
Dept: PHYSICAL THERAPY | Age: 65
Setting detail: THERAPIES SERIES
Discharge: HOME | End: 2020-12-08
Payer: MEDICARE

## 2020-12-08 PROCEDURE — 97140 MANUAL THERAPY 1/> REGIONS: CPT

## 2020-12-08 PROCEDURE — 97110 THERAPEUTIC EXERCISES: CPT

## 2020-12-08 PROCEDURE — G0283 ELEC STIM OTHER THAN WOUND: HCPCS

## 2020-12-08 NOTE — PROGRESS NOTES
[address] [facility]                Phone: [phone]   Fax: [phone]    Physical Therapy Daily Treatment Note  Date:  2020    Patient Name:  Michelle Gibson    :  1955  MRN: [id number]    Evaluating therapist:  DEVONTE Castillo            (20)  Restrictions/Precautions:    Diagnosis:  s/p R shoulder sx   Treatment Diagnosis:    Insurance/Certification information:    Referring Physician:    Plan of care signed (Y/N):    Visit# / total visits:   3/24  Pain level: 9/10       Time In:      1316  Time Out:   1434    Subjective:  Patient presents with sling R UE for first of two scheduled treatment sessions this week. He reports pain in R shoulder as  /10 prior to session this afternoon. Exercises:  Exercise/Equipment Resistance/Repetitions Other comments   Pulleys flex/abd 5 min ea           Table slides flex  10x 5s                         abd 10x 5s                         ER 10x 5s           Scapular retraction   20x          wand IR/ER  2 x10  supine         manual PROM   10 min         Pendulums crosses/circles 20x ea 2#                    Objective:  Ther. exercise as listed per flow sheet above. Treatment completed with MH/IFC to R shoulder x 15 minutes. Supine AAROM flexion 110°  abduction 100°     Assessment: Patient performs all exercises well with good effort. Pacing slowed due to pain in R shoulder. Home Exercise Program:  Reviewed with copy provided 20    Manual Treatments:      Modalities:   IFC/CP to R shoulder x 15 minutes    Timed Code Treatment Minutes:        Total Treatment Minutes:  3    Treatment/Activity Tolerance:  [x] Patient tolerated treatment well [] Patient limited by fatigue  [] Patient limited by pain  [] Patient limited by other medical complications  [] Other:     Prognosis: [] Good [x] Fair  [] Poor    Patient Requires Follow-up: [x] Yes  [] No    Plan:   [x] Continue per plan of care [] Alter current plan (see comments)  [] Plan of care initiated [] Hold pending MD visit [] Discharge    Plan for Next Session:    Progress POC as tolerated     See Weekly Progress Note: []  Yes  []  No  Next due:          Electronically signed by:  Miles Villagomez, PTA [id number]

## 2020-12-10 ENCOUNTER — HOSPITAL ENCOUNTER (OUTPATIENT)
Dept: PHYSICAL THERAPY | Age: 65
Setting detail: THERAPIES SERIES
Discharge: HOME | End: 2020-12-10
Payer: MEDICARE

## 2020-12-10 PROCEDURE — 97110 THERAPEUTIC EXERCISES: CPT

## 2020-12-10 PROCEDURE — G0283 ELEC STIM OTHER THAN WOUND: HCPCS

## 2020-12-10 NOTE — PROGRESS NOTES
[address] [facility]                Phone: [phone]   Fax: [phone]    Physical Therapy Daily Treatment Note  Date:  12/10/2020    Patient Name:  Nelson Carter    :  1955  MRN: [id number]    Evaluating therapist:  Gilmore Felty, MPT            (20)  Restrictions/Precautions:    Diagnosis:  s/p R shoulder sx   Treatment Diagnosis:    Insurance/Certification information:    Referring Physician:    Plan of care signed (Y/N):    Visit# / total visits:      Pain level: 9/10       Time In:      1302  Time Out:    1402     Subjective:  Patient presents with no sling R UE for second of two scheduled treatment sessions this week. He reports his R shoulder is sore 9/10 prior to session this afternoon. Exercises:  Exercise/Equipment Resistance/Repetitions Other comments   Pulleys flex/abd 5 min ea           Table slides flex  10x 5s                         abd 10x 5s                         ER 10x 5s         Supine wand press 2 x10                          flexion x10                          IR/ER 2 x10         Supine manual PROM/AAROM 10 min  gentle overpressure at end ranges          Scapular retraction   20x          Pendulums crosses/circles 20x ea 2#                    Objective:  Ther. exercise as listed per flow sheet above. Treatment completed with MH/IFC to R shoulder x 15 minutes. Supine wand flexion to 135°     Assessment: Patient performs all exercises well with good effort and improved pacing. Home Exercise Program:  Reviewed with copy provided 20    Manual Treatments:      Modalities:   IFC/CP to R shoulder x 15 minutes    Timed Code Treatment Minutes:        Total Treatment Minutes:      Treatment/Activity Tolerance:  [x] Patient tolerated treatment well [] Patient limited by fatigue  [] Patient limited by pain  [] Patient limited by other medical complications  [] Other:     Prognosis: [] Good [x] Fair  [] Poor    Patient Requires Follow-up: [x] Yes  [] No    Plan:   [x] Continue per plan of care [] Alter current plan (see comments)  [] Plan of care initiated [] Hold pending MD visit [] Discharge    Plan for Next Session:    Progress POC as tolerated     See Weekly Progress Note: []  Yes  []  No  Next due:          Electronically signed by:  Miles Michelle, PTA [id number]

## 2020-12-15 ENCOUNTER — HOSPITAL ENCOUNTER (OUTPATIENT)
Dept: PHYSICAL THERAPY | Age: 65
Setting detail: THERAPIES SERIES
Discharge: HOME | End: 2020-12-15
Payer: MEDICARE

## 2020-12-15 PROCEDURE — 97140 MANUAL THERAPY 1/> REGIONS: CPT

## 2020-12-15 PROCEDURE — G0283 ELEC STIM OTHER THAN WOUND: HCPCS

## 2020-12-15 PROCEDURE — 97110 THERAPEUTIC EXERCISES: CPT

## 2020-12-15 NOTE — PROGRESS NOTES
[address] [facility]                Phone: [phone]   Fax: [phone]    Physical Therapy Daily Treatment Note  Date:  12/15/2020    Patient Name:  Adriana Mcintyre    :  1955  MRN: [id number]      Evaluating therapist:  DEVONTE Guillermo            (20)  Restrictions/Precautions:    Diagnosis:  s/p R shoulder sx   Treatment Diagnosis:  R RTC repair, SAD, bicep tenodesis ()  Insurance/Certification information:    Referring Physician:  Dr. Madeline Cohen of care signed (Y/N):    Visit# / total visits:      Pain level: 9/10     Time In:      2318  Time Out:   1430      s/p 7+ weeks R RTC / SAD / bicep tenodesis    RTP  20    Subjective:  Patient presents with no sling R UE for first of two scheduled treatment sessions this week. He states his shoulder is doing \"a little better\" and he is using it more but he reports pain in his R shoulder 9/10 prior to session this afternoon. Exercises:  Exercise/Equipment Resistance/Repetitions Other comments   Pulleys flex/abd 5 min ea         UBE standing 5 min L1           Table slides flex  10x 5s                         abd 10x 5s                         ER 10x 5s         AROM bicep curl  x10 ea  in  pro/sup/neutral        Supine wand press 2 x10                          flexion 2 x10         IR/ER seated 2 x15  towel under arm         Supine manual PROM/AAROM 15 min  gentle overpressure at end ranges          Scapular retraction   20x          Pendulums crosses/circles 20x ea 2#                    Objective:  Ther. exercise as listed per flow sheet above. Treatment completed with MH/IFC to R shoulder x 15 minutes. Supine PROM flexion to   130°  abduction to 115°  ER (90/90) to 50°  Strength R shoulder grossly 3-/5     Assessment: Patient performs all exercises well with good effort and improved pacing. ROM shows improvement this week.       Home Exercise Program:  Reviewed with copy provided Reason For Visit  Reason For Visit:   Patient presents for follow-up medication management .   Chaperone: KRYSTLE BECKHAM is unaccompanied.   :  services not used.        History of Present Illness    Patient overall appears to be doing well with current medication regimen and the use of coping skills when required. Pt reports symptoms of anxiety, depression, and sleep are usually manageable, does report anxiety related to medical condition and concern over family dynamic and thoughts they speak about him behind his back, admits some excess related worry. Patient attends therapy, is working to reframe better cope. Patient has appointment with pulmonary on 8/15/18 to re-evaluate plan of care. Patient does state he believes medications as prescribed help, except the change to clonazepam. 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 Patient does not report additional problems or symptoms. Patient has been compliant with medications and denies significant side effects. Patient denies episodes of agitation, hypomania, psychosis, suicidal/homicidal ideation since last office visit.        Review of Systems    Systemic: no recent weight change, no malaise, no fatigue and no anorexia.   Eyes: no significant vision changes and no blurred vision.   Cardiovascular: no chest pain, the heart is not racing, no lower extremity edema, no palpitations, no lightheadedness and no intermittent leg claudication.   Pulmonary: uses 1L O2 while at rest, 2L O2 during activity, during exertion, when lying down and expressed as feeling short of breath, but no chronic cough, no hemoptysis and no PND.   Gastrointestinal: no abdominal pain, no nausea, no vomiting and no diarrhea.   Genitourinary: no urinary frequency and no urinary urgency.   Hematologic and Lymphatic: no tendency for easy bruising, no tendency for easy bleeding and no jaundice.   Musculoskeletal: generalized muscle aches, but no diffuse joint pain,  no joint swelling and no joint stiffness.   Neurological: no headache, no dizziness, no fainting, no confusion, no generalized decrease in strength, no leg weakness, no paresthesias, no tingling and no leg numbness.   Psychiatric: depression and anxiety, but no feelings of hopelessness, no anhedonia, no insomnia and not suicidal.   Integumentary and Breasts: no rashes and no edema.      Active Problems   1. Generalized anxiety disorder (F41.1)   · Assessed By: TRELL JARRELL (Behavioral Health); Last Assessed: 30 Jul 2018   2. Insomnia (G47.00)   · Assessed By: TRELL JARRELL (Behavioral Health); Last Assessed: 30 Jul 2018   3. OCD (obsessive compulsive disorder) (F42.9)   · Assessed By: TRELL JARRELL (Behavioral Health); Last Assessed: 30 Jul 2018   4. Panic disorder without agoraphobia (F41.0)   · Assessed By: TRELL JARRELL (Behavioral Health); Last Assessed: 30 Jul 2018    Allergies   1. No Known Drug Allergies    Screening  PHQ-9 Depression Screening:   Over the past 2 weeks, how often have you been bothered by the following problems?   1.) Little interest or pleasure in doing things? Half the days or more.   2.) Feeling down, depressed or hopeless? Several days.   3.) Trouble falling asleep or sleeping too much? Half the days or more.   4.) Feeling tired or having little energy? Several days.   5.) Poor appetite or overeating? Half the days or more.   6.) Feeling bad about yourself, or that you are a failure, or have let yourself or your family down? Half the days or more.   7.) Trouble concentrating on things, such as reading a newspaper or watching television? Several days.   8.) Moving or speaking so slowly that other people could have noticed, or the opposite, moving or speaking faster than usual? Several days.   9.) Thoughts that you would be better off dead or of hurting yourself in some way? Not at all.   TOTAL SCORE: 12, severity of depression is moderate.   How difficult have these  problems made it for you to do your work, take care of things at home, or get along with people? Somewhat difficult.      Physical Exam    Orientation: Oriented x3.   Memory: short term memory intact, remote memory intact and recent registration memory intact.   Attention/Concentration: the attention span was normal, normal concentrating ability and visual attention was not decreased.   Language: no difficulty naming common objects, no difficulty repeating a phrase and no difficulty writing a sentence.   Fund of knowledge: Patient displays adequate knowledge of current events, adequate fund of knowledge regarding past history and adequate fund of knowledge regarding vocabulary.   Observed mood and affect: anxious, depressed and was appropriate.   Observed appearance/grooming: neat   The patient's psychomotor tone exhibited normal psychomotor tone   The patient's speech exhibited a normal rate   Thought processes: The patient exhibited normal thought processes.   Associations: Evaluation of thought association showed no deficiency.   Thought Content: The patient denied delusions, denied hallucinations, admitted to obsessions, denied preoccupation with violent thoughts, denied suicidal ideation, denied suicidal intent, denied suicidal plans, denied homicidal ideation, denied homicidal intention, denied homicidal plans and future oriented.         Judgment/Insight:The patient demonstrated intact judgment and intact insight.       Assessment   1. OCD (obsessive compulsive disorder) (F42.9)   · Assessed By: TRELL JARRELL (Behavioral Health); Last Assessed: 30 Jul 2018   2. Panic disorder without agoraphobia (F41.0)   · Assessed By: TRELL JARRELL (Behavioral Health); Last Assessed: 30 Jul 2018   3. Generalized anxiety disorder (F41.1)   · Assessed By: TRELL JARRELL (Behavioral Health); Last Assessed: 30 Jul 2018   4. Insomnia (G47.00)   · Assessed By: TRELL JARRELL (Behavioral Health); Last Assessed: 30  Jul 2018    depression major, recurrent .   generalized anxiety disorder.     Deferred.     As per past medical/surgical history.      Social environment problems.   Social maladjustment.    Occupational problems.    Financial problems.      Axis V global assessment of functioning (GAF) scale is 90.       Plan   · ALPRAZolam 1 MG Oral Tablet; TAKE 1 TABLET 3 TIMES DAILY AS NEEDED   Rx By: TRELL JARRELL; Dispense: 30 Days ; #:90 Tablet; Refill: 0;For: Panic disorder without agoraphobia; DEB = N; Verified Transmission to [facility] [id number]; Last Updated By: System, [employer]; 7/30/2018 11:21:47 AM    PLAN:   -Continue fluvoxamine 200mg qhs, quetiapine 500mg qhs. Stop clonazepam. Start alprazolam 1mg tid prn (sent).   -Appointment with pulmonary 8/15/18.   MEDICATION:   Proper usage and side effects of medications reviewed and discussed.   Alternative treatment options discussed.   Patient verbalized understanding and gave informed consent.   [facility] Database checked on 07/30/2018 for KRYSTLE BECKHAM prior to prescribing controlled substances and no activity (or no suspected fraudulent activity) was found.    PSYCHOTHERAPY STRATEGIES AND TECHNIQUES USED:   Empathetic Validation .    Suportive .   Good response.     TREATMENT PLAN:   Patient will contact the office should questions/concerns arise, and/or if symptoms change/develop/worsen.     SAFETY PLAN:   Patient was advised to call 911 or to the nearest Emergency Room for acute or emergency situations.   GOALS AND RECOMMENDATIONS:   Patient will follow up in: 2 weeks.    Patient to call in: questions/concerns.    Plan reviewed.    Patient agrees with plan.        Continue with Primary Care Physician:.    Continue with Therapist:.    Patient education completed on disease process, etiology, and prognosis.   Patient gives informed consent for continued treatment.   Return to the clinic as clinically indicated as discussed with patient who verbalized understanding  of and agreement with the plan.       Discussion/Summary  [place] clonazepam #42 on 7/17/18, returned 9 tablets this date (witness, Danette Chang practice manager) appropriately discarded.      Signatures   Electronically signed by : RACHEL MAYES; Jul 30 2018 12:01PM CST

## 2020-12-17 ENCOUNTER — HOSPITAL ENCOUNTER (OUTPATIENT)
Dept: PHYSICAL THERAPY | Age: 65
Setting detail: THERAPIES SERIES
Discharge: HOME | End: 2020-12-17
Payer: MEDICARE

## 2020-12-17 PROCEDURE — 97140 MANUAL THERAPY 1/> REGIONS: CPT

## 2020-12-17 PROCEDURE — 97110 THERAPEUTIC EXERCISES: CPT

## 2020-12-17 PROCEDURE — G0283 ELEC STIM OTHER THAN WOUND: HCPCS

## 2020-12-21 ENCOUNTER — HOSPITAL ENCOUNTER (EMERGENCY)
Age: 65
Discharge: HOME | End: 2020-12-21
Payer: MEDICARE

## 2020-12-21 ENCOUNTER — HOSPITAL ENCOUNTER (OUTPATIENT)
Dept: PHYSICAL THERAPY | Age: 65
Setting detail: THERAPIES SERIES
Discharge: HOME | End: 2020-12-21
Payer: MEDICARE

## 2020-12-21 VITALS
DIASTOLIC BLOOD PRESSURE: 85 MMHG | OXYGEN SATURATION: 96 % | TEMPERATURE: 96.3 F | RESPIRATION RATE: 18 BRPM | SYSTOLIC BLOOD PRESSURE: 128 MMHG | HEART RATE: 97 BPM

## 2020-12-21 PROCEDURE — 97140 MANUAL THERAPY 1/> REGIONS: CPT

## 2020-12-21 PROCEDURE — 6370000000 HC RX 637 (ALT 250 FOR IP): Performed by: NURSE PRACTITIONER

## 2020-12-21 PROCEDURE — 97110 THERAPEUTIC EXERCISES: CPT

## 2020-12-21 PROCEDURE — G0283 ELEC STIM OTHER THAN WOUND: HCPCS

## 2020-12-21 PROCEDURE — 99283 EMERGENCY DEPT VISIT LOW MDM: CPT

## 2020-12-21 RX ORDER — HYDROCODONE BITARTRATE AND ACETAMINOPHEN 5; 325 MG/1; MG/1
1 TABLET ORAL EVERY 8 HOURS PRN
Qty: 18 TABLET | Refills: 0 | Status: SHIPPED | OUTPATIENT
Start: 2020-12-21 | End: 2020-12-21 | Stop reason: SDUPTHER

## 2020-12-21 RX ORDER — HYDROCODONE BITARTRATE AND ACETAMINOPHEN 5; 325 MG/1; MG/1
2 TABLET ORAL ONCE
Status: COMPLETED | OUTPATIENT
Start: 2020-12-21 | End: 2020-12-21

## 2020-12-21 RX ORDER — HYDROCODONE BITARTRATE AND ACETAMINOPHEN 5; 325 MG/1; MG/1
1 TABLET ORAL EVERY 8 HOURS PRN
Qty: 18 TABLET | Refills: 0 | Status: SHIPPED | OUTPATIENT
Start: 2020-12-21 | End: 2020-12-24

## 2020-12-21 RX ADMIN — HYDROCODONE BITARTRATE AND ACETAMINOPHEN 2 TABLET: 5; 325 TABLET ORAL at 13:57

## 2020-12-21 ASSESSMENT — PAIN SCALES - GENERAL: PAINLEVEL_OUTOF10: 5

## 2020-12-21 NOTE — PROGRESS NOTES
[address] [facility]                Phone: [phone]   Fax: [phone]    Physical Therapy Daily Treatment Note  Date:  2020    Patient Name:  Alcon Love    :  1955  MRN: [id number]      Evaluating therapist:  DEVONTE Almaguer            (20)  Restrictions/Precautions:    Diagnosis:  s/p R shoulder sx   Treatment Diagnosis:  R RTC repair, SAD, bicep tenodesis ()  Insurance/Certification information:    Referring Physician:  Dr. Andres Bucio of care signed (Y/N):    Visit# / total visits:      Pain level: 8/10     Time In:      1020  Time Out:    1130     s/p 8+ weeks R RTC / SAD / bicep tenodesis  RTP  20    Subjective:  Patient presents for first of two scheduled treatment sessions this week. He reports pain in his R shoulder 8/10 prior to session this afternoon. Exercises:  Exercise/Equipment Resistance/Repetitions Other comments   UBE standing 5 min L1         Pulleys flex/abd 5 min ea         Supine PROM/AAROM/GH jt glides 15 min PROM with gentle overpressure        S/L ER 2 x10 towel under arm/ manual assist        Supine wand press + 2 x10                          flexion 2 x10         AROM bicep curl  x15 ea in  pro/sup/neutral        Scapular retraction 20x 3s           Table slides flex  10x 5s                         abd 10x 5s                         ER 10x 5s         Pendulums crosses/circles 20x ea 2#                    Objective:  Ther. exercise as listed per flow sheet above. Treatment completed with MH/IFC to R shoulder x 15 minutes. Supine PROM flexion to 150°  abduction to 125°  ER (90/90) to 60°  Supine AROM  flexion to 145°  abduction to 130°   Strength R shoulder grossly 3/5     Assessment: Patient performs all exercises well with good effort and improved pacing. PROM/AROM improved this week. Strength is also improved.       Home Exercise Program:  Reviewed with copy provided 20    Manual Treatments: Supine PROM/AAROM/GH jt glides x 15 min    Modalities:   IFC/MH to R shoulder x 15 minutes    Timed Code Treatment Minutes: Total Treatment Minutes:      Treatment/Activity Tolerance:  [x] Patient tolerated treatment well [] Patient limited by fatigue  [] Patient limited by pain  [] Patient limited by other medical complications  [] Other:     Prognosis: [] Good [x] Fair  [] Poor    Patient Requires Follow-up: [x] Yes  [] No    Plan:   [x] Continue per plan of care [] Alter current plan (see comments)  [] Plan of care initiated [] Hold pending MD visit [] Discharge    Plan for Next Session:   Continue POC with  progression per patient tolerance.       See Weekly Progress Note: []  Yes  []  No  Next due:          Electronically signed by:  Miles Reyna, PTA [id number]

## 2020-12-22 ENCOUNTER — APPOINTMENT (OUTPATIENT)
Dept: PHYSICAL THERAPY | Age: 65
End: 2020-12-22
Payer: MEDICARE

## 2020-12-22 NOTE — ED PROVIDER NOTES
[address]  Department of Emergency Medicine   ED  Encounter Note  Admit Date/RoomTime: 2020  1:17 PM  ED Room: ST02/ST-2    NAME: Valery Recinos  : 1955  MRN: [id number]     Chief Complaint:  Hip Pain (left side, had MRI few weeks ago. Has not had follow up apt yet and pain is getting worse, denies injury )    History of Present Illness        Valery Recinos is a 72 y.o. old male with a prior history of ongoing chronic back pain from a remote injury, presents to the emergency department by private vehicle, for non-traumatic acute-on-chronic, aching and sharp left sided lower lumbar spine pain to the left thigh, for a few year(s) prior to arrival.  There has been no recent injury as it relates to today's visit. Since onset the symptoms have been stable and is mild-to-moderate in severity. He has associated signs & symptoms of nothing additional.   He denies any bladder or bowel incontinence , new weakness, tingling or paresthesias, recent back injection, recent spinal surgery, recent spinal/chiropractic manipulation, history of IVDA or fever. The pain is aggraveated by any movement and relieved by nothing in particular. He is currently enrolled in an pain management program, however is not getting pain medications at this time, he is scheduled for injections. He admits that the pain is not worse than his normal chronic back pain however he is run out of his Norco.  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 ROS   Pertinent positives and negatives are stated within HPI, all other systems reviewed and are negative. Past Medical History:  has a past medical history of Anxiety, Chronic right-sided low back pain with right-sided sciatica, Elbow pain, and Right hand pain. Surgical History:  has a past surgical history that includes shoulder surgery; arthrodesis;  Finger surgery; and epidural steroid injection (Right, 4/11/2019). Social History:  reports that he has been smoking cigarettes. He has a 17.50 pack-year smoking history. He has never used smokeless tobacco. He reports previous drug use. Drug: Cocaine. He reports that he does not drink alcohol. Family History: family history includes Heart Disease in his father; Stroke in his mother. Allergies: Penicillins    Physical Exam   Oxygen Saturation Interpretation: normal.        ED Triage Vitals   BP Temp Temp src Pulse Resp SpO2 Height Weight   12/21/20 1321 12/21/20 1315 -- 12/21/20 1315 12/21/20 1315 12/21/20 1315 -- --   128/85 96.3 °F (35.7 °C)  97 16 96 %           Constitutional:  Alert, development consistent with age. HEENT:  NC/NT. Airway patent. Neck:  Normal ROM. Supple. Respiratory:  Clear to auscultation and breath sounds equal.  CV:  Regular rate and rhythm, normal heart sounds, without pathological murmurs, ectopy, gallops, or rubs. GI:  Abdomen Soft, nontender, good bowel sounds. No firm or pulsatile mass. Back: lower lumbar spine left sided. Tenderness: Mild. Swelling: no.              Range of Motion: full range of motion. CVA Tenderness: No.            Straight leg raising:  Bilateral negative. Skin:  no erythema, rash or swelling noted. Distal Function:              Motor deficit: none. Sensory deficit: none. Pulse deficit: none. Calf Tenderness:  No Bilateral.               Edema:  none Both lower extremity(s). Deep Tendon Reflexes (DTR's) to bilateral knee's and ankle's are normo-reflexive   Gait:  normal.  Integument:  Normal turgor. Warm, dry, without visible rash. Lymphatics: No lymphangitis or adenopathy noted. Neurological:  Oriented. Motor functions intact.     Lab / Imaging Results   (All laboratory and radiology results have been personally reviewed by myself)  Labs:  No results found for this visit on 12/21/20. Imaging: All Radiology results interpreted by Radiologist unless otherwise noted. No orders to display       ED Course / Medical Decision Making     Medications   HYDROcodone-acetaminophen (NORCO) 5-325 MG per tablet 2 tablet (2 tablets Oral Given 12/21/20 3617)            Consult(s):   None    Procedure(s):   none    Medical Decision Making/Counseling:    *At this time the patient is without objective evidence of an acute process requiring inpatient management. Chronic pain, no new injury, no neurovascular deficits. 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 Patient will be given 1 refill on his Norco with the expectation that he follows up with [facility] clinic and establishing pain management either there or locally if he needs any further refills. He was educated on signs and symptoms which require emergent evaluation. Assessment      1. Acute exacerbation of chronic low back pain    2. Encounter for medication refill      Plan   Discharge to home. Patient condition is good    New Medications     Discharge Medication List as of 12/21/2020  1:58 PM        Electronically signed by RADHA Minor Aas, CNP   DD: 12/22/20  **This report was transcribed using voice recognition software. Every effort was made to ensure accuracy; however, inadvertent computerized transcription errors may be present.   END OF ED PROVIDER NOTE      RADHA Jain CNP  12/22/20 9372

## 2020-12-23 ENCOUNTER — HOSPITAL ENCOUNTER (OUTPATIENT)
Dept: PHYSICAL THERAPY | Age: 65
Setting detail: THERAPIES SERIES
Discharge: HOME | End: 2020-12-23
Payer: MEDICARE

## 2020-12-23 PROCEDURE — 97140 MANUAL THERAPY 1/> REGIONS: CPT

## 2020-12-23 PROCEDURE — 97110 THERAPEUTIC EXERCISES: CPT

## 2020-12-23 PROCEDURE — G0283 ELEC STIM OTHER THAN WOUND: HCPCS

## 2020-12-23 NOTE — PROGRESS NOTES
Treatment/Activity Tolerance:  [x] Patient tolerated treatment well [] Patient limited by fatigue  [] Patient limited by pain  [] Patient limited by other medical complications  [] Other:     Prognosis: [] Good [x] Fair  [] Poor    Patient Requires Follow-up: [x] Yes  [] No    Plan:   [x] Continue per plan of care [] Alter current plan (see comments)  [] Plan of care initiated [] Hold pending MD visit [] Discharge    Plan for Next Session:   Continue POC with  progression per patient tolerance.       See Weekly Progress Note: []  Yes  []  No  Next due:          Electronically signed by:  Rodena Holstein, [employer], PTA [id number]

## 2020-12-28 ENCOUNTER — HOSPITAL ENCOUNTER (OUTPATIENT)
Dept: PHYSICAL THERAPY | Age: 65
Setting detail: THERAPIES SERIES
Discharge: HOME | End: 2020-12-28
Payer: MEDICARE

## 2020-12-28 PROCEDURE — 97140 MANUAL THERAPY 1/> REGIONS: CPT

## 2020-12-28 PROCEDURE — 97110 THERAPEUTIC EXERCISES: CPT

## 2020-12-28 NOTE — PROGRESS NOTES
[address] [facility]                Phone: [phone]   Fax: [phone]    Physical Therapy Daily Treatment Note  Date:  2020    Patient Name:  Alcon Love    :  1955  MRN: [id number]      Evaluating therapist:  DEVONTE Almaguer            (20)  Restrictions/Precautions:    Diagnosis:  s/p R shoulder sx   Treatment Diagnosis:  R RTC repair, SAD, bicep tenodesis (37/49/45)  Insurance/Certification information:    Referring Physician:  Dr. Andres Bucio of care signed (Y/N):    Visit# / total visits:      Pain level: 8/10     Time In:     09  Time Out:      s/p 9+ weeks R RTC / SAD / bicep tenodesis  RTP  20    Subjective:  Patient presents for first of two scheduled treatment sessions this week. He reports pain in his R shoulder  8/10 prior to session this morning, but states he feels it is \"moving a little better\". Exercises:  Exercise/Equipment Resistance/Repetitions Other comments   UBE seated  F/B 3/3 min ea L1         Pulleys flex/abd 5 min ea         Supine PROM/AAROM/GH jt glides 15 min PROM with gentle overpressure        S/L ER 3 x10  1# towel under arm        Supine wand press + 2 x15                          flexion 2 x15         AROM bicep curl  x15 ea 1# in  pro/sup/neutral        Scapular retraction 2 x15           Table slides flex  10x 10s                         abd 10x 10s                         ER 10x 10s         Pendulums crosses/circles 20x ea 2#                    Objective:  Ther. exercise as listed per flow sheet above. Treatment completed with  to R shoulder x 15 minutes. Supine AROM flexion to 150°  abduction to 135°  ER (90/90) to 65°  IR (90/90) to 55°  Strength R shoulder grossly 3 to 3+/5       Assessment: Patient performs all exercises well with good effort and improved pacing. AROM and strength are improved across R shoulder. Per patient,  pain levels are not improved at this time.         Home Exercise Program:  Reviewed with copy provided 12/8/20    Manual Treatments:  Supine PROM/AAROM/GH jt glides x 15 min    Modalities:  MH to R shoulder x 15 minutes    Timed Code Treatment Minutes: Total Treatment Minutes:      Treatment/Activity Tolerance:  [x] Patient tolerated treatment well [] Patient limited by fatigue  [] Patient limited by pain  [] Patient limited by other medical complications  [] Other:     Prognosis: [] Good [x] Fair  [] Poor    Patient Requires Follow-up: [x] Yes  [] No    Plan:   [x] Continue per plan of care [] Alter current plan (see comments)  [] Plan of care initiated [] Hold pending MD visit [] Discharge    Plan for Next Session:   Continue POC with  progression per patient tolerance.       See Weekly Progress Note: []  Yes  []  No  Next due:          Electronically signed by:  Miles Pena, PTA [id number]

## 2020-12-30 ENCOUNTER — APPOINTMENT (OUTPATIENT)
Dept: PHYSICAL THERAPY | Age: 65
End: 2020-12-30
Payer: MEDICARE

## 2021-01-04 ENCOUNTER — HOSPITAL ENCOUNTER (OUTPATIENT)
Dept: PHYSICAL THERAPY | Age: 66
Setting detail: THERAPIES SERIES
Discharge: HOME | End: 2021-01-04
Payer: MEDICARE

## 2021-01-04 PROCEDURE — 97140 MANUAL THERAPY 1/> REGIONS: CPT

## 2021-01-04 PROCEDURE — G0283 ELEC STIM OTHER THAN WOUND: HCPCS

## 2021-01-04 PROCEDURE — 97110 THERAPEUTIC EXERCISES: CPT

## 2021-01-04 NOTE — PROGRESS NOTES
[address] [facility]                Phone: [phone]   Fax: [phone]    Physical Therapy Daily Treatment Note  Date:  2021    Patient Name:  Napoleon Mandel    :  1955  MRN: [id number]      Evaluating therapist:  DEVONTE Rosario            (20)  Restrictions/Precautions:    Diagnosis:  s/p R shoulder sx   Treatment Diagnosis:  R RTC repair, SAD, bicep tenodesis ()  Insurance/Certification information:    Referring Physician:  Dr. Maynor Russell of care signed (Y/N):    Visit# / total visits:    10/24  Pain level:  7/10     Time In:     1003  Time Out:   1123    s/p 10+ weeks R RTC / SAD / bicep tenodesis  RTP       Subjective:   Patient presents for first of two scheduled treatment sessions this week. He reports pain in his R shoulder  /10 prior to session this morning. Exercises:  Exercise/Equipment Resistance/Repetitions Other comments   UBE seated  F/B 3/3 min ea L1         Pulleys flex/abd 5 min ea         Supine PROM/AAROM/GH jt glides 15 min PROM with gentle overpressure        S/L ER 3 x10  2# towel under arm        Supine wand press + 2 x15  2#                          flexion 2 x15   2#         AROM bicep curl  x15 ea 2# in  pro/sup/neutral        Elastic band high ext 2 x15 otb                         mid row 2 x15 gtb         Table slides flex  10x 10s                         abd 10x 10s                         ER 10x 10s         Pendulums crosses/circles 20x ea 2#                    Objective:  Ther. exercise as listed per flow sheet above. Treatment completed with MH to R shoulder x 15 minutes. Supine PROM flexion to 145°  abduction to 135°  Standing AROM flexion to 130°  abduction to 120°  ER reach T1-2  IR reach to T12-L1  Strength R shoulder grossly  3+/5     Assessment: Patient performs all exercises well with good effort and improved pacing. AROM and strength continue to show slow but steady improvement across R shoulder.   Pain levels do not seem to restrict patient significantly through activities/exercises of session. Goals:    goal 1: decrease pain across R shoulder with all prolonged activities, 0-4/10  goal 2: increase AROM R shoulder:  flex/abd= 140*/140*, IR/ER= L4/back of head  goal 3: increase strength across R shoulder to grossly 4-, 4/5 for all ranges  goal 4: improve endurance for all prolonged activities to GOOD-/GOOD  goal 5: assure I with HEP for home management of condition    Home Exercise Program:  Reviewed with copy provided 12/8/20    Manual Treatments:  Supine PROM (w/ overpressure) / AAROM / [address] [facility] x 15 min    Modalities:  MH to R shoulder x 15 minutes    Timed Code Treatment Minutes: Total Treatment Minutes:      Treatment/Activity Tolerance:  [x] Patient tolerated treatment well [] Patient limited by fatigue  [] Patient limited by pain  [] Patient limited by other medical complications  [] Other:     Prognosis: [] Good [x] Fair  [] Poor    Patient Requires Follow-up: [x] Yes  [] No    Plan:   [x] Continue per plan of care [] Alter current plan (see comments)  [] Plan of care initiated [] Hold pending MD visit [] Discharge    Plan for Next Session:   Continue POC with  progression per patient tolerance.       See Weekly Progress Note: []  Yes  []  No  Next due:          Electronically signed by:  Miles Mota, PTA [id number]

## 2021-01-06 ENCOUNTER — HOSPITAL ENCOUNTER (OUTPATIENT)
Dept: PHYSICAL THERAPY | Age: 66
Setting detail: THERAPIES SERIES
Discharge: HOME | End: 2021-01-06
Payer: MEDICARE

## 2021-01-06 PROCEDURE — 97140 MANUAL THERAPY 1/> REGIONS: CPT

## 2021-01-06 PROCEDURE — G0283 ELEC STIM OTHER THAN WOUND: HCPCS

## 2021-01-06 PROCEDURE — 97110 THERAPEUTIC EXERCISES: CPT

## 2021-01-06 NOTE — PROGRESS NOTES
[address] [facility]                Phone: [phone]   Fax: [phone]    Physical Therapy Daily Treatment Note  Date:  2021    Patient Name:  Davon Santos    :  1955  MRN: [id number]      Evaluating therapist:  DEVONTE Mccray            (20)  Restrictions/Precautions:    Diagnosis:  s/p R shoulder sx   Treatment Diagnosis:  R RTC repair, SAD, bicep tenodesis ()  Insurance/Certification information:    Referring Physician:  Dr. Gold Boggs of care signed (Y/N):    Visit# / total visits:      Pain level:  7/10     Time In:     0935  Time Out:   3377    s/p 11 weeks R RTC / SAD / bicep tenodesis  RTP   21    Subjective:   Patient presents for second of two scheduled treatment sessions this week. He states his shoulder is \"feeling better\"  reports pain in his R shoulder as 6/10 prior to session this morning. He states it continues to feel stiff however. Exercises:  Exercise/Equipment Resistance/Repetitions Other comments   UBE seated  F/B 3/3 min ea L2         Pulleys flex/abd 5 min ea         Supine PROM/AAROM/GH jt glides 15 min PROM with overpressure        S/L ER 3 x10  2# towel under arm        Supine wand press + 2 x15   3#                          flexion 2 x15   3#         AROM bicep curl  x15 ea 2# in  pro/sup/neutral        Elastic band high ext 2 x15 otb                         mid row 2 x15 gtb         Table slides flex  10x 10s                         abd 10x 10s                         ER 10x 10s         Pendulums crosses/circles 20x ea 2#                    Objective:  Ther. exercise as listed per flow sheet above. Treatment completed with MH/IFC to R shoulder x 15 minutes. Assessment: Patient performs all exercises well with good effort and pacing. Mild increase in pain from manual ROM, reduced with modalities.       Goals:    goal 1: Decrease pain across R shoulder with all prolonged activities, 0-4/10  goal 2: Increase AROM R shoulder:  flex/abd= 140° /140°, IR/ER= L4/back of head  goal 3: Increase strength across R shoulder to grossly 4- to 4/5 for all ranges  goal 4: Improve endurance for all prolonged activities to GOOD-/GOOD  goal 5: Assure I with HEP for home management of condition    Home Exercise Program:  Reviewed with copy provided 12/8/20    Manual Treatments:  Supine PROM (w/ overpressure) / AAROM / GH jt glides x 15 min    Modalities:  MH/IFC to R shoulder x 15 minutes    Timed Code Treatment Minutes: Total Treatment Minutes:      Treatment/Activity Tolerance:  [x] Patient tolerated treatment well [] Patient limited by fatigue  [] Patient limited by pain  [] Patient limited by other medical complications  [] Other:     Prognosis: [] Good [] Fair  [] Poor    Patient Requires Follow-up: [x] Yes  [] No    Plan:   [x] Continue per plan of care [] Alter current plan (see comments)  [] Plan of care initiated [] Hold pending MD visit [] Discharge    Plan for Next Session:   Continue POC with  progression per patient tolerance.       See Weekly Progress Note: []  Yes  []  No  Next due:          Electronically signed by:  Miles Márquez, PTA [id number]

## 2021-01-13 ENCOUNTER — HOSPITAL ENCOUNTER (OUTPATIENT)
Dept: PHYSICAL THERAPY | Age: 66
Setting detail: THERAPIES SERIES
Discharge: HOME | End: 2021-01-13
Payer: MEDICARE

## 2021-01-13 PROCEDURE — G0283 ELEC STIM OTHER THAN WOUND: HCPCS

## 2021-01-13 PROCEDURE — 97110 THERAPEUTIC EXERCISES: CPT

## 2021-01-13 NOTE — PROGRESS NOTES
not seem to hinder patient during session. Endurance for all prolonged activities to FAIR+/GOOD-    Goals:    goal 1: Decrease pain across R shoulder with all prolonged activities, 0-4/10  goal 2: Increase AROM R shoulder:  flex/abd= 140° /140°, IR/ER= L4/back of head  goal 3: Increase strength across R shoulder to grossly 4- to 4/5 for all ranges  goal 4: Improve endurance for all prolonged activities to GOOD-/GOOD  goal 5: Assure I with HEP for home management of condition    Home Exercise Program:  Reviewed with copy provided 12/8/20    Manual Treatments:  Supine PROM (w/ overpressure) / AAROM / GH jt glides x 15 min    Modalities:  MH/IFC to R shoulder x 15 minutes    Timed Code Treatment Minutes: Total Treatment Minutes:      Treatment/Activity Tolerance:  [x] Patient tolerated treatment well [] Patient limited by fatigue  [] Patient limited by pain  [] Patient limited by other medical complications  [] Other:     Prognosis: [] Good [] Fair  [] Poor    Patient Requires Follow-up: [x] Yes  [] No    Plan:   [x] Continue per plan of care [] Alter current plan (see comments)  [] Plan of care initiated [] Hold pending MD visit [] Discharge    Plan for Next Session:   Continue POC with  progression per patient tolerance.       See Weekly Progress Note: []  Yes  []  No  Next due:          Electronically signed by:  Miles Llanos, PTA [id number]

## 2021-01-19 ENCOUNTER — APPOINTMENT (OUTPATIENT)
Dept: GENERAL RADIOLOGY | Age: 66
End: 2021-01-19
Payer: MEDICARE

## 2021-01-19 ENCOUNTER — HOSPITAL ENCOUNTER (EMERGENCY)
Age: 66
Discharge: HOME | End: 2021-01-19
Payer: MEDICARE

## 2021-01-19 VITALS
SYSTOLIC BLOOD PRESSURE: 133 MMHG | BODY MASS INDEX: 30.36 KG/M2 | HEIGHT: 69 IN | RESPIRATION RATE: 17 BRPM | OXYGEN SATURATION: 98 % | WEIGHT: 205 LBS | HEART RATE: 88 BPM | DIASTOLIC BLOOD PRESSURE: 88 MMHG | TEMPERATURE: 98 F

## 2021-01-19 DIAGNOSIS — Z20.822 ENCOUNTER FOR LABORATORY TESTING FOR COVID-19 VIRUS: ICD-10-CM
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
## 2021-01-19 DIAGNOSIS — S70.02XA CONTUSION OF LEFT HIP, INITIAL ENCOUNTER: Primary | ICD-10-CM

## 2021-01-19 DIAGNOSIS — S30.0XXA CONTUSION OF LOWER BACK, INITIAL ENCOUNTER: ICD-10-CM

## 2021-01-19 LAB — SARS-COV-2, NAAT: NOT DETECTED

## 2021-01-19 PROCEDURE — U0002 COVID-19 LAB TEST NON-CDC: HCPCS

## 2021-01-19 PROCEDURE — 99283 EMERGENCY DEPT VISIT LOW MDM: CPT

## 2021-01-19 PROCEDURE — 73502 X-RAY EXAM HIP UNI 2-3 VIEWS: CPT

## 2021-01-19 PROCEDURE — 72110 X-RAY EXAM L-2 SPINE 4/>VWS: CPT

## 2021-01-19 RX ORDER — HYDROCODONE BITARTRATE AND ACETAMINOPHEN 5; 325 MG/1; MG/1
1 TABLET ORAL EVERY 4 HOURS PRN
Qty: 18 TABLET | Refills: 0 | Status: SHIPPED | OUTPATIENT
Start: 2021-01-19 | End: 2021-01-22

## 2021-01-19 ASSESSMENT — PAIN DESCRIPTION - LOCATION: LOCATION: HIP

## 2021-01-19 ASSESSMENT — PAIN DESCRIPTION - ORIENTATION: ORIENTATION: LEFT

## 2021-01-19 NOTE — ED PROVIDER NOTES
[address]  Department of Emergency Medicine   ED  Encounter Note  Admit Date/RoomTime: 2021  1:05 PM  ED Room: [facility]    NAME: Maegan Miller  : 1955  MRN: [id number]     Chief Complaint:  Fall (slipped on steps at home and hip left hip today.)    History of Present Illness       Maegan Miller is a 72 y.o. old male who presents to the emergency department by private vehicle, for a mechanical fall which occured several hour(s) prior to arrival. He was reportedly walking down some steps when he slipped and fell while at home prior to incident with complaints of left hip and low back pain. The patients tetanus status is up to date. Since onset the symptoms have been stable. His pain is aggraveated by walking and relieved by nothing, as no treatment has been provided prior to this visit. He denies any head injury, headache, loss of consciousness, confusion, dizziness, neck pain, chest pain, abdominal pain, numbness, weakness, blurred vision, nausea or vomiting. He takes no blood thinning agents. Patient also states that he needs a covid test bc he is having surgery soon at the [address] [place] [address]  . ROS   Pertinent positives and negatives are stated within HPI, all other systems reviewed and are negative. Past Medical History:  has a past medical history of Anxiety, Chronic right-sided low back pain with right-sided sciatica, Elbow pain, and Right hand pain. Surgical History:  has a past surgical history that includes shoulder surgery; arthrodesis; Finger surgery; and epidural steroid injection (Right, 2019). Social History:  reports that he has been smoking cigarettes. He has a 17.50 pack-year smoking history. He has never used smokeless tobacco. He reports previous drug use. Drug: Cocaine. He reports that he does not drink alcohol. Family History: family history includes Heart Disease in his father; Stroke in his mother.      Allergies: Penicillins    Physical Exam   Oxygen Saturation Interpretation: Normal.        ED Triage Vitals   BP Temp Temp src Pulse Resp SpO2 Height Weight   01/19/21 1304 01/19/21 1248 -- 01/19/21 1248 01/19/21 1300 01/19/21 1248 01/19/21 1300 01/19/21 1300   132/62 97.1 °F (36.2 °C)  90 19 94 % 5' 9\" (1.753 m) 205 lb (93 kg)         Physical Exam  Constitutional:  Alert, development consistent with age. HEENT:  NC/NT. Airway patent. Neck:  No midline or paravertebral tenderness. Normal ROM. Supple. Chest:  Symmetrical without visible rash or tenderness. Respiratory:  Lungs Clear to auscultation and breath sounds equal.  CV:  Regular rate and rhythm, normal heart sounds, without pathological murmurs, ectopy, gallops, or rubs. GI:  Abdomen Soft, nontender, good bowel sounds. No firm or pulsatile mass. Hip:   Left: hip. Tenderness:  mild. Swelling: None. Deformity: no.       ROM: full range with pain. Distal Function:        Motor deficit: none. Sensory deficit: none. Pulse deficit: none. Capillary refill: normal.  Extremity Skin:  no erythema, rash or swelling noted. Calf Tenderness:  No Bilateral.          Edema:  none Both lower extremity(s). Pelvis:  Bilateral.        Tenderness: none. Stability:  stable to palpation. Gait:  normal.  Back:  No midline or paravertebral tenderness. No costovertebral tenderness. Extremities: No tenderness or edema noted. Integument:  Normal turgor. Warm, dry, without visible rash, unless noted elsewhere. Lymphatic: no lymphadenopathy noted  Neurological:  Oriented x3, GCS 15. Motor functions intact. Lab / Imaging Results   (All laboratory and radiology results have been personally reviewed by myself)  Labs:  Results for orders placed or performed during the hospital encounter of 01/19/21   COVID-19   Result Value Ref Range    SARS-CoV-2, NAAT Not Detected Not Detected       Imaging:   All Radiology results DD: 1/19/21  **This report was transcribed using voice recognition software. Every effort was made to ensure accuracy; however, inadvertent computerized transcription errors may be present.   END OF ED PROVIDER NOTE       RADHA Bee - MURIEL  01/19/21 9052

## 2021-01-20 ENCOUNTER — HOSPITAL ENCOUNTER (OUTPATIENT)
Dept: PHYSICAL THERAPY | Age: 66
Setting detail: THERAPIES SERIES
Discharge: HOME | End: 2021-01-20
Payer: MEDICARE

## 2021-01-20 PROCEDURE — G0283 ELEC STIM OTHER THAN WOUND: HCPCS

## 2021-01-20 PROCEDURE — 97110 THERAPEUTIC EXERCISES: CPT

## 2021-01-20 NOTE — PROGRESS NOTES
prolonged activities to FAIR+/GOOD-    Goals:    goal 1: Decrease pain across R shoulder with all prolonged activities, 0-4/10  goal 2: Increase AROM R shoulder:  flex/abd= 140° /140°, IR/ER= L4/back of head  goal 3: Increase strength across R shoulder to grossly 4- to 4/5 for all ranges  goal 4: Improve endurance for all prolonged activities to GOOD-/GOOD  goal 5: Assure I with HEP for home management of condition    Home Exercise Program:  Reviewed with copy provided 12/8/20    Manual Treatments:  Supine PROM (w/ overpressure) / AAROM / GH jt glides x 15 min    Modalities:  MH/IFC to R shoulder x 15 minutes    Timed Code Treatment Minutes: Total Treatment Minutes:      Treatment/Activity Tolerance:  [x] Patient tolerated treatment well [] Patient limited by fatigue  [] Patient limited by pain  [] Patient limited by other medical complications  [] Other:     Prognosis: [] Good [] Fair  [] Poor    Patient Requires Follow-up: [x] Yes  [] No    Plan:   [x] Continue per plan of care [] Alter current plan (see comments)  [] Plan of care initiated [] Hold pending MD visit [] Discharge    Plan for Next Session:   Continue POC with  progression per patient tolerance.       See Weekly Progress Note: []  Yes  []  No  Next due:          Electronically signed by:  Miles Palomino, PTA [id number]

## 2021-01-25 ENCOUNTER — HOSPITAL ENCOUNTER (OUTPATIENT)
Dept: PHYSICAL THERAPY | Age: 66
Setting detail: THERAPIES SERIES
Discharge: HOME | End: 2021-01-25
Payer: MEDICARE

## 2021-01-25 NOTE — PROGRESS NOTES
[facility]                Phone: [phone]  Fax: [phone]    Physical Therapy  Cancellation/No-show Note  Patient Name:  Brandi Zarate  :  1955   Date:  2021    For today's appointment patient:  [x]  Cancelled  []  Rescheduled appointment  []  No-show     Reason given by patient:  []  Patient ill  []  Conflicting appointment  []  No transportation    []  Conflict with work  []  No reason given  [x]  Other:  therapist illness    Comments:      Electronically signed by:   Stevenson Lentz

## 2021-01-29 ENCOUNTER — OFFICE VISIT (OUTPATIENT)
Dept: FAMILY MEDICINE CLINIC | Age: 66
End: 2021-01-29
Payer: MEDICARE

## 2021-01-29 VITALS
SYSTOLIC BLOOD PRESSURE: 132 MMHG | RESPIRATION RATE: 18 BRPM | BODY MASS INDEX: 29.47 KG/M2 | HEIGHT: 69 IN | WEIGHT: 199 LBS | TEMPERATURE: 97.1 F | HEART RATE: 82 BPM | DIASTOLIC BLOOD PRESSURE: 86 MMHG | OXYGEN SATURATION: 98 %

## 2021-01-29 DIAGNOSIS — F17.200 SMOKER: ICD-10-CM

## 2021-01-29 DIAGNOSIS — Z12.11 COLON CANCER SCREENING: ICD-10-CM

## 2021-01-29 DIAGNOSIS — M25.511 CHRONIC RIGHT SHOULDER PAIN: ICD-10-CM

## 2021-01-29 DIAGNOSIS — Z91.81 AT HIGH RISK FOR FALLS: ICD-10-CM

## 2021-01-29 DIAGNOSIS — J01.00 ACUTE NON-RECURRENT MAXILLARY SINUSITIS: Primary | ICD-10-CM

## 2021-01-29 DIAGNOSIS — R09.89 CHEST CONGESTION: ICD-10-CM

## 2021-01-29 DIAGNOSIS — G89.29 CHRONIC RIGHT SHOULDER PAIN: ICD-10-CM

## 2021-01-29 PROCEDURE — 99214 OFFICE O/P EST MOD 30 MIN: CPT | Performed by: PHYSICIAN ASSISTANT

## 2021-01-29 PROCEDURE — 4040F PNEUMOC VAC/ADMIN/RCVD: CPT | Performed by: PHYSICIAN ASSISTANT

## 2021-01-29 PROCEDURE — G8484 FLU IMMUNIZE NO ADMIN: HCPCS | Performed by: PHYSICIAN ASSISTANT

## 2021-01-29 PROCEDURE — 4004F PT TOBACCO SCREEN RCVD TLK: CPT | Performed by: PHYSICIAN ASSISTANT

## 2021-01-29 PROCEDURE — G8417 CALC BMI ABV UP PARAM F/U: HCPCS | Performed by: PHYSICIAN ASSISTANT

## 2021-01-29 PROCEDURE — 3017F COLORECTAL CA SCREEN DOC REV: CPT | Performed by: PHYSICIAN ASSISTANT

## 2021-01-29 PROCEDURE — 1123F ACP DISCUSS/DSCN MKR DOCD: CPT | Performed by: PHYSICIAN ASSISTANT

## 2021-01-29 PROCEDURE — G8427 DOCREV CUR MEDS BY ELIG CLIN: HCPCS | Performed by: PHYSICIAN ASSISTANT

## 2021-01-29 RX ORDER — DOXYCYCLINE HYCLATE 100 MG
100 TABLET ORAL 2 TIMES DAILY
Qty: 14 TABLET | Refills: 0 | Status: SHIPPED | OUTPATIENT
Start: 2021-01-29 | End: 2021-02-05

## 2021-01-29 RX ORDER — KETOROLAC TROMETHAMINE 30 MG/ML
30 INJECTION, SOLUTION INTRAMUSCULAR; INTRAVENOUS ONCE
Status: COMPLETED | OUTPATIENT
Start: 2021-01-29 | End: 2021-01-29

## 2021-01-29 RX ORDER — FLUTICASONE PROPIONATE 50 MCG
1 SPRAY, SUSPENSION (ML) NASAL DAILY
Qty: 1 BOTTLE | Refills: 5 | Status: SHIPPED
Start: 2021-01-29 | End: 2021-06-11 | Stop reason: SDUPTHER

## 2021-01-29 RX ORDER — BROMPHENIRAMINE MALEATE, PSEUDOEPHEDRINE HYDROCHLORIDE, AND DEXTROMETHORPHAN HYDROBROMIDE 2; 30; 10 MG/5ML; MG/5ML; MG/5ML
5 SYRUP ORAL 4 TIMES DAILY PRN
Qty: 240 ML | Refills: 1 | Status: SHIPPED | OUTPATIENT
Start: 2021-01-29 | End: 2021-02-28

## 2021-01-29 RX ADMIN — KETOROLAC TROMETHAMINE 30 MG: 30 INJECTION, SOLUTION INTRAMUSCULAR; INTRAVENOUS at 12:14

## 2021-01-29 ASSESSMENT — PATIENT HEALTH QUESTIONNAIRE - PHQ9
2. FEELING DOWN, DEPRESSED OR HOPELESS: 0
SUM OF ALL RESPONSES TO PHQ9 QUESTIONS 1 & 2: 0

## 2021-01-29 NOTE — PROGRESS NOTES
Congestion:  Patient is here with complaints of congestion, sinus pressure, drainage and cough for 1 week(s). Cough is  productive. Over the counter medications include none. Patient does not have a change in appetite. Patient is  drinking well. Patient does smoke. Sick contacts include no one. He is starting to consider smoking cessation. \"I know that I need to. \"     Patient said that he is having R shoulder pain. He is seeing PT. Patient's past medical, surgical, social and/or family history reviewed, updated in chart, and are non-contributory (unless otherwise stated). Medications and allergies also reviewed and updated in chart. Review of Systems:  Constitutional:  - fever, + fatigue, + chills, + headaches, no weight change, +reduced appetite  Dermatology:  No rash, no mole, no dry or sensitive skin  ENT:  + cough, + sore throat, + sinus pain, + runny nose, no ear pain  Cardiology:  No chest pain, no palpitations, no leg edema, no shortness of breath, no PND  Gastroenterology:  No dysphagia, no abdominal pain, no nausea, no vomiting, no constipation, no diarrhea, no heartburn  Musculoskeletal:  No joint pain, no leg cramps, no back pain, no muscle aches  Respiratory:  No shortness of breath, no orthopnea, no wheezing, no HAMMOND, no hemoptysis  Urology:  No blood in the urine, no urinary frequency, no urinary incontinence, no urinary urgency, no nocturia, no dysuria      Vitals:    01/29/21 1152   BP: 132/86   Site: Right Upper Arm   Position: Sitting   Cuff Size: Large Adult   Pulse: 82   Resp: 18   Temp: 97.1 °F (36.2 °C)   TempSrc: Infrared   SpO2: 98%   Weight: 199 lb (90.3 kg)   Height: 5' 9\" (1.753 m)       Physical Exam  Constitutional:       General: He is not in acute distress. Appearance: He is well-developed. HENT:      Head: Normocephalic and atraumatic.       Right Ear: External ear normal.      Left Ear: External ear normal.      Nose: Nose normal.      Right Sinus: No maxillary sinus tenderness or frontal sinus tenderness. Left Sinus: No maxillary sinus tenderness or frontal sinus tenderness. Eyes:      General: No scleral icterus. Conjunctiva/sclera: Conjunctivae normal.      Pupils: Pupils are equal, round, and reactive to light. Neck:      Musculoskeletal: Normal range of motion and neck supple. Thyroid: No thyromegaly. Cardiovascular:      Rate and Rhythm: Normal rate and regular rhythm. Heart sounds: Normal heart sounds. No murmur. Pulmonary:      Effort: Pulmonary effort is normal. No accessory muscle usage or respiratory distress. Breath sounds: Normal breath sounds. No wheezing. Musculoskeletal: Normal range of motion. Skin:     General: Skin is warm and dry. Findings: No rash. Neurological:      Mental Status: He is alert and oriented to person, place, and time. Deep Tendon Reflexes: Reflexes are normal and symmetric. Psychiatric:         Speech: Speech normal.         Behavior: Behavior normal.         Assessment/Plan:      Luis Carlos was seen today for sinus problem and shoulder pain. Diagnoses and all orders for this visit:    Acute non-recurrent maxillary sinusitis  -     doxycycline hyclate (VIBRA-TABS) 100 MG tablet; Take 1 tablet by mouth 2 times daily for 7 days    Chest congestion  -     brompheniramine-pseudoephedrine-DM (BROMFED DM) 2-30-10 MG/5ML syrup; Take 5 mLs by mouth 4 times daily as needed for Congestion or Cough    Chronic right shoulder pain  -     ketorolac (TORADOL) injection 30 mg    Colon cancer screening  -     POCT Fit Test; Future    At high risk for falls    Smoker  Discussed methods of smoking cessation including Nicoderm patch, gum, and inhaler, Zyban and Chantix. All side effects explained. Pt chose to continue moving toward active cessation. He is thinking more strongly about it.    Other orders  -     fluticasone (FLONASE) 50 MCG/ACT nasal spray; 1 spray by Each Nostril route daily      As above. Call or go to ED immediately if symptoms worsen or persist.  Return if symptoms worsen or fail to improve. , or sooner if necessary. Educational materials and/or home exercises printed for patient's review and were included in patient instructions on his/her After Visit Summary and given to patient at the end of visit. Counseled regarding above diagnosis, including possible risks and complications,  especially if left uncontrolled. Counseled regarding the possible side effects, risks, benefits and alternatives to treatment; patient and/or guardian verbalizes understanding, agrees, feels comfortable with and wishes to proceed with above treatment plan. Advised patient to call with any new medication issues, and read all Rx info from pharmacy to assure aware of all possible risks and side effects of medication before taking. Reviewed age and gender appropriate health screening exams and vaccinations. Advised patient regarding importance of keeping up with recommended health maintenance and to schedule as soon as possible if overdue, as this is important in assessing for undiagnosed pathology, especially cancer, as well as protecting against potentially harmful/life threatening disease. Patient and/or guardian verbalizes understanding and agrees with above counseling, assessment and plan. All questions answered. Bella Locke PA-C  1/29/2021    I have personally reviewed and updated the chief complaint, HPI, Past Medical, Family and Social History, as well as the above Review of Systems.

## 2021-02-01 ENCOUNTER — HOSPITAL ENCOUNTER (OUTPATIENT)
Dept: PHYSICAL THERAPY | Age: 66
Setting detail: THERAPIES SERIES
Discharge: HOME | End: 2021-02-01
Payer: MEDICARE

## 2021-02-01 NOTE — PROGRESS NOTES
[address] [facility]                Phone: [phone]  Fax: [phone]    Physical Therapy  Cancellation/No-show Note  Patient Name:  Aida Urias  :  1955   Date:  2021    For today's appointment patient:  [x]  Cancelled  []  Rescheduled appointment  []  No-show     Reason given by patient:  []  Patient ill  []  Conflicting appointment  [x]  No transportation    []  Conflict with work  []  No reason given  []  Other:     Comments:      Electronically signed by:  Sandie Allen ATC, PTA

## 2021-02-03 ENCOUNTER — HOSPITAL ENCOUNTER (OUTPATIENT)
Dept: PHYSICAL THERAPY | Age: 66
Setting detail: THERAPIES SERIES
Discharge: HOME | End: 2021-02-03
Payer: MEDICARE

## 2021-02-03 PROCEDURE — 97110 THERAPEUTIC EXERCISES: CPT

## 2021-02-03 PROCEDURE — G0283 ELEC STIM OTHER THAN WOUND: HCPCS

## 2021-02-03 NOTE — PROGRESS NOTES
[facility]                Phone: [phone]   Fax: [phone]    Physical Therapy Daily Treatment Note  Date:  2/3/2021    Patient Name:  Kristie Rizzo    :  1955  MRN: [id number]      Evaluating therapist:  DEVONTE Monteiro            (20)  Restrictions/Precautions:    Diagnosis:  s/p R shoulder sx   Treatment Diagnosis:  R RTC repair, SAD, bicep tenodesis (74/87/86)  Insurance/Certification information:    Referring Physician:  Dr. Olga Alicia of care signed (Y/N):    Visit# / total visits:      Pain level:   5/10     Time In:    1313  Time Out:    2821    s/p 15 weeks R RTC / SAD / bicep tenodesis  RTP   21    Subjective:   Patient presents for first of two scheduled treatment sessions this week. He was initially noted as a No Show for this morning but he arrived after 12:00 for 10:00 appt. Patient was willing to wait until after lunch for treatment session at 1:00. He states his shoulder is getting a little better and reports pain in his R shoulder as 5/10 prior to session this morning. Exercises:  Exercise/Equipment Resistance/Repetitions Other comments   UBE seated  F/B 3/3 min ea L2         Pulleys flex/abd 5 min ea         S/L ER 3 x10  2# towel under arm        Supine wand press + 2 x15   3#                          flexion 2 x15   3#         AROM bicep curl  x15 ea 2# in  pro/sup/neutral        Finger ladders  flex/abd 10x ea 1#         Elastic band high ext 2 x15 gtb                         mid row 2 x15 btb                        horz.abd/ER 2 x15 otb         Table slides flex  10x 10s                         abd 10x 10s                         ER 10x 10s         Pendulums crosses/circles 20x ea 2#                    Objective:  Ther. exercise as listed per flow sheet above. Treatment completed with MH/IFC to R shoulder x 15 minutes.    Standing AROM flexion to 140°  abduction to 140° mildly increased pain nearing end range elevation  ER reach T1-2  IR reach to T11-12  Strength R shoulder grossly  4-/5       Assessment: Patient performs all exercises well with good effort and pacing. AROM and strength mildly improved from previous session. Endurance for all prolonged activities to GOOD-    Goals:    goal 1: Decrease pain across R shoulder with all prolonged activities, 0-4/10  goal 2: Increase AROM R shoulder:  flex/abd= 140° /140°, IR/ER= L4/back of head  goal 3: Increase strength across R shoulder to grossly 4- to 4/5 for all ranges  goal 4: Improve endurance for all prolonged activities to GOOD-/GOOD  goal 5: Assure I with [facility] for home management of condition    Home Exercise Program:  Reviewed with copy provided 12/8/20    Manual Treatments:  Supine PROM (w/ overpressure) / AAROM / GH jt glides x 15 min    Modalities:  MH/IFC to R shoulder x 15 minutes    Timed Code Treatment Minutes: Total Treatment Minutes:      Treatment/Activity Tolerance:  [x] Patient tolerated treatment well [] Patient limited by fatigue  [] Patient limited by pain  [] Patient limited by other medical complications  [] Other:     Prognosis: [] Good [] Fair  [] Poor    Patient Requires Follow-up: [x] Yes  [] No    Plan:   [x] Continue per plan of care [] Alter current plan (see comments)  [] Plan of care initiated [] Hold pending MD visit [] Discharge    Plan for Next Session:   Continue POC with  progression per patient tolerance.       See Weekly Progress Note: []  Yes  []  No  Next due:          Electronically signed by:  Miles Llanos, PTA [id number]

## 2021-02-05 ENCOUNTER — HOSPITAL ENCOUNTER (OUTPATIENT)
Dept: PHYSICAL THERAPY | Age: 66
Setting detail: THERAPIES SERIES
Discharge: HOME | End: 2021-02-05
Payer: MEDICARE

## 2021-02-05 PROCEDURE — 97110 THERAPEUTIC EXERCISES: CPT

## 2021-02-05 PROCEDURE — G0283 ELEC STIM OTHER THAN WOUND: HCPCS

## 2021-02-05 NOTE — PROGRESS NOTES
[address] [facility]                Phone: [phone]   Fax: [phone]    Physical Therapy Daily Treatment Note  Date:  2021    Patient Name:  Ashtyn Carias    :  1955  MRN: [id number]      Evaluating therapist:  DEVONTE Mcwilliams            (20)  Restrictions/Precautions:    Diagnosis:  s/p R shoulder sx   Treatment Diagnosis:  R RTC repair, SAD, bicep tenodesis (/)  Insurance/Certification information:    Referring Physician:  Dr. Rosette Sutton of care signed (Y/N):    Visit# / total visits:      Pain level:   0-4/10     Time In:      Time Out:       s/p 15+ weeks R RTC / SAD / bicep tenodesis  RTP   21    Subjective:   Patient presents for final scheduled treatment session. He reports no pain in his R shoulder prior to session this morning. He reports pain in his shoulder yesterday was as much as 4/10. Exercises:  Exercise/Equipment Resistance/Repetitions Other comments   UBE seated  F/B 3/3 min ea L2         Pulleys flex/abd 5 min ea         S/L ER 3 x10  2# towel under arm        Supine wand press + 2 x15   3#                          flexion 2 x15   3#         AROM bicep curl  x15 ea 2# in  pro/sup/neutral        Finger ladders  flex/abd 10x ea 1#         Elastic band high ext 2 x15 gtb                         mid row 2 x15 btb                        horz.abd/ER 2 x15 otb         Table slides flex  10x 10s                         abd 10x 10s                         ER 10x 10s         Pendulums crosses/circles 20x ea 2#                    Objective:  Ther. exercise as listed per flow sheet above. Treatment completed with MH/IFC to R shoulder x 15 minutes. Standing AROM flexion to 140°  abduction to 140° c/o stiffness but no c/o pain at end range elevation  ER reach T2-3  IR reach to T11-12  Strength R shoulder grossly  4- to 4/5     Assessment: Patient performs all exercises well with good effort and pacing.   Endurance for

## 2021-02-08 ENCOUNTER — APPOINTMENT (OUTPATIENT)
Dept: PHYSICAL THERAPY | Age: 66
End: 2021-02-08
Payer: MEDICARE

## 2021-02-10 ENCOUNTER — APPOINTMENT (OUTPATIENT)
Dept: PHYSICAL THERAPY | Age: 66
End: 2021-02-10
Payer: MEDICARE

## 2021-02-28 ENCOUNTER — HOSPITAL ENCOUNTER (EMERGENCY)
Age: 66
Discharge: HOME | End: 2021-02-28
Payer: MEDICARE

## 2021-02-28 VITALS
HEIGHT: 69 IN | WEIGHT: 199 LBS | BODY MASS INDEX: 29.47 KG/M2 | OXYGEN SATURATION: 98 % | HEART RATE: 95 BPM | TEMPERATURE: 97 F | RESPIRATION RATE: 18 BRPM | SYSTOLIC BLOOD PRESSURE: 119 MMHG | DIASTOLIC BLOOD PRESSURE: 89 MMHG

## 2021-02-28 DIAGNOSIS — M25.559 HIP PAIN: Primary | ICD-10-CM

## 2021-02-28 DIAGNOSIS — M16.12 OSTEOARTHRITIS OF LEFT HIP, UNSPECIFIED OSTEOARTHRITIS TYPE: ICD-10-CM

## 2021-02-28 PROCEDURE — 99283 EMERGENCY DEPT VISIT LOW MDM: CPT

## 2021-02-28 PROCEDURE — 6370000000 HC RX 637 (ALT 250 FOR IP): Performed by: NURSE PRACTITIONER

## 2021-02-28 RX ORDER — HYDROCODONE BITARTRATE AND ACETAMINOPHEN 5; 325 MG/1; MG/1
1 TABLET ORAL ONCE
Status: COMPLETED | OUTPATIENT
Start: 2021-02-28 | End: 2021-02-28

## 2021-02-28 RX ORDER — HYDROCODONE BITARTRATE AND ACETAMINOPHEN 5; 325 MG/1; MG/1
1 TABLET ORAL EVERY 8 HOURS PRN
Qty: 10 TABLET | Refills: 0 | Status: SHIPPED | OUTPATIENT
Start: 2021-02-28 | End: 2021-03-03

## 2021-02-28 RX ORDER — LIDOCAINE 50 MG/G
1 PATCH TOPICAL DAILY
Qty: 10 PATCH | Refills: 0 | Status: SHIPPED | OUTPATIENT
Start: 2021-02-28 | End: 2021-03-10

## 2021-02-28 RX ADMIN — HYDROCODONE BITARTRATE AND ACETAMINOPHEN 1 TABLET: 5; 325 TABLET ORAL at 11:51

## 2021-02-28 ASSESSMENT — PAIN DESCRIPTION - LOCATION: LOCATION: HIP

## 2021-02-28 NOTE — ED PROVIDER NOTES
walking, relieved by nothing. ROS   Pertinent positives and negatives are stated within HPI, all other systems reviewed and are negative. Past Medical History:  has a past medical history of Anxiety, Chronic right-sided low back pain with right-sided sciatica, Elbow pain, and Right hand pain. Surgical History:  has a past surgical history that includes shoulder surgery; arthrodesis; Finger surgery; and epidural steroid injection (Right, 4/11/2019). Social History:  reports that he has been smoking cigarettes. He has a 17.50 pack-year smoking history. He has never used smokeless tobacco. He reports previous drug use. Drug: Cocaine. He reports that he does not drink alcohol. Family History: family history includes Heart Disease in his father; Stroke in his mother. Allergies: Penicillins    Physical Exam   Oxygen Saturation Interpretation: Normal.        ED Triage Vitals   BP Temp Temp Source Pulse Resp SpO2 Height Weight   02/28/21 1101 02/28/21 1053 02/28/21 1053 02/28/21 1053 02/28/21 1053 02/28/21 1053 02/28/21 1101 02/28/21 1101   119/89 97.6 °F (36.4 °C) Temporal 96 22 94 % 5' 9\" (1.753 m) 199 lb (90.3 kg)         Constitutional:  Alert. Development consistent with age. Head:  Atraumatic, without temporal or scalp tenderness. Eyes:  PERRL, EOMI. No periorbital ecchymoses. Conjunctiva: normal.  Ears:  External ears without lesions. Throat:  Pharynx without lesions. Airway patient. Neck:  Supple. Nontender. Chest:  Symmetrical without visible rash or tenderness. Respiratory:  Clear to auscultation and breath sounds equal.  CV:  Regular rate and rhythm, normal heart sounds, without pathological murmurs. GI:  Abdmen Soft, nontender. No firm or pulsatile mass. No abrasions, ecchymoses, or lacerations. Back:  No costovertebral, paravertebral, intervertebral, or vertebral tenderness or spasm. Musculoskeletal:  Pelvis:  Bilateral.        Tenderness: none.        Stability:  stable to palpation. Hip(s):  Left: hip. Tenderness:  moderate. Swelling: None. Deformity: no.       ROM: diminished range with pain. Skin: normal exam; no wounds, erythema, or swelling. Neurovascular: Motor deficit: none. Sensory deficit: none. Pulse deficit: none. Capillary refill: normal.       Calf Tenderness:  No Bilateral.  No calf pain or cord tenderness. Negative Homans' sign bilaterally. Edema:  none Both lower extremity(s). Gait:  limp due to affected limb. Integument:  No abrasions, ecchymoses, or lacerations unless noted elsewhere. Neurological:  Orientation age-appropriate. Motor functions intact. Lab / Imaging Results   (All laboratory and radiology results have been personally reviewed by myself)  Labs:  No results found for this visit on 02/28/21. Imaging: All Radiology results interpreted by Radiologist unless otherwise noted. No orders to display     ED Course / Medical Decision Making     Medications   HYDROcodone-acetaminophen (NORCO) 5-325 MG per tablet 1 tablet (1 tablet Oral Given 2/28/21 1151)          Consult(s):   None    Procedure(s):   none    MDM:   Beatrice Bowden is a 40-year-old male who presented to the emergency department for complaint of left hip pain. He reported that his hip pain has been ongoing for the past several months. He reported being enrolled in pain management for other chronic pain in the past.  He stated that due to insurance issues he no longer was able to see his previous  pain management doctor. He reported that he has recently changed insurance and is able to see a new pain management doctor. He reported that he has an appointment in 3 days. He also reported that he has an appointment with his PCP in 4 days. He reported that he was given hydrocodone mid-January which she was using sparingly.   Reported that he did run out 1 week ago and has been attempting to use over-the-counter Tylenol and ibuprofen with no improvement. Reported that his pain has increased over the past 2 days. Denies any new injury or trauma. No fever or chills. No complaint of low back pain. No signs or concerns for cauda equina. No neurovascular deficits. No motor or sensory deficits. Compartments are soft and compressible. There was no sign of concerns for DVT. Was ambulatory and had a steady gait. Mild limp due to discomfort. I did discuss with him in detail the ED is not the facility for pain management. He is aware and verbalizes understanding. He reported that he would have followed through it sooner except for insurance issues. I did review his OARRS which showed that he was last prescribed 3 days worth of [place] on 1/19/2021. At that time during ED visit he had imaging of his hip which showed degenerative changes. No fracture or dislocation. There is no need at this time for further imaging. He remained hemodynamically stable, nontoxic, and appropriate for outpatient management. He was prescribed short-term Norco and given specific instructions that pain management needs to control his pain from here out. He verbalized understanding agrees with plan. Advised to return for any new, change, worsening symptoms or concerns. At this time the patient is without objective evidence of an acute process requiring hospitalization or inpatient management. They have remained hemodynamically stable throughout their entire ED visit and are stable for discharge with outpatient follow-up. The plan has been discussed in detail and they are aware of the specific conditions for emergent return, as well as the importance of follow-up. Plan of Care/Counseling:  I reviewed today's visit with the patient in addition to providing specific details for the plan of care and counseling regarding the diagnosis and prognosis. Questions are answered at this time and are agreeable with the plan. Assessment      1.  Hip pain 2. Osteoarthritis of left hip, unspecified osteoarthritis type      Plan   Discharge home. Patient condition is good    New Medications     Discharge Medication List as of 2/28/2021 11:51 AM      START taking these medications    Details   HYDROcodone-acetaminophen (NORCO) 5-325 MG per tablet Take 1 tablet by mouth every 8 hours as needed for Pain for up to 3 days. Intended supply: 3 days. Take lowest dose possible to manage pain, Disp-10 tablet, R-0Print      lidocaine (LIDODERM) 5 % Place 1 patch onto the skin daily for 10 days 12 hours on, 12 hours off., Disp-10 patch, R-0Print           Electronically signed by RADHA Fragoso CNP   DD: 2/28/21  **This report was transcribed using voice recognition software. Every effort was made to ensure accuracy; however, inadvertent computerized transcription errors may be present.   END OF ED PROVIDER NOTE      RADHA Fragoso CNP  02/28/21 9775

## 2021-03-08 ENCOUNTER — OFFICE VISIT (OUTPATIENT)
Dept: FAMILY MEDICINE CLINIC | Age: 66
End: 2021-03-08
Payer: MEDICARE

## 2021-03-08 VITALS
WEIGHT: 206 LBS | BODY MASS INDEX: 30.51 KG/M2 | DIASTOLIC BLOOD PRESSURE: 82 MMHG | TEMPERATURE: 97.2 F | RESPIRATION RATE: 18 BRPM | SYSTOLIC BLOOD PRESSURE: 124 MMHG | HEART RATE: 76 BPM | OXYGEN SATURATION: 96 % | HEIGHT: 69 IN

## 2021-03-08 DIAGNOSIS — Z12.11 COLON CANCER SCREENING: ICD-10-CM

## 2021-03-08 DIAGNOSIS — J30.2 SEASONAL ALLERGIES: ICD-10-CM

## 2021-03-08 DIAGNOSIS — Z00.00 ROUTINE GENERAL MEDICAL EXAMINATION AT A HEALTH CARE FACILITY: Primary | ICD-10-CM

## 2021-03-08 DIAGNOSIS — G89.4 CHRONIC PAIN SYNDROME: ICD-10-CM

## 2021-03-08 LAB
CONTROL: POSITIVE
HEMOCCULT STL QL: NEGATIVE

## 2021-03-08 PROCEDURE — G0438 PPPS, INITIAL VISIT: HCPCS | Performed by: PHYSICIAN ASSISTANT

## 2021-03-08 PROCEDURE — 3017F COLORECTAL CA SCREEN DOC REV: CPT | Performed by: PHYSICIAN ASSISTANT

## 2021-03-08 PROCEDURE — 96372 THER/PROPH/DIAG INJ SC/IM: CPT | Performed by: PHYSICIAN ASSISTANT

## 2021-03-08 PROCEDURE — G8484 FLU IMMUNIZE NO ADMIN: HCPCS | Performed by: PHYSICIAN ASSISTANT

## 2021-03-08 PROCEDURE — 4040F PNEUMOC VAC/ADMIN/RCVD: CPT | Performed by: PHYSICIAN ASSISTANT

## 2021-03-08 PROCEDURE — 1123F ACP DISCUSS/DSCN MKR DOCD: CPT | Performed by: PHYSICIAN ASSISTANT

## 2021-03-08 PROCEDURE — 82274 ASSAY TEST FOR BLOOD FECAL: CPT | Performed by: PHYSICIAN ASSISTANT

## 2021-03-08 RX ORDER — LORATADINE 10 MG/1
10 TABLET ORAL DAILY
Qty: 30 TABLET | Refills: 1 | Status: SHIPPED | OUTPATIENT
Start: 2021-03-08 | End: 2021-04-07

## 2021-03-08 RX ORDER — KETOROLAC TROMETHAMINE 30 MG/ML
30 INJECTION, SOLUTION INTRAMUSCULAR; INTRAVENOUS ONCE
Status: COMPLETED | OUTPATIENT
Start: 2021-03-08 | End: 2021-03-08

## 2021-03-08 RX ADMIN — KETOROLAC TROMETHAMINE 30 MG: 30 INJECTION, SOLUTION INTRAMUSCULAR; INTRAVENOUS at 10:21

## 2021-03-08 ASSESSMENT — LIFESTYLE VARIABLES
AUDIT-C TOTAL SCORE: INCOMPLETE
AUDIT TOTAL SCORE: INCOMPLETE
HOW OFTEN DO YOU HAVE A DRINK CONTAINING ALCOHOL: 0
HOW OFTEN DO YOU HAVE A DRINK CONTAINING ALCOHOL: NEVER

## 2021-03-08 ASSESSMENT — PATIENT HEALTH QUESTIONNAIRE - PHQ9: SUM OF ALL RESPONSES TO PHQ9 QUESTIONS 1 & 2: 0

## 2021-03-08 NOTE — PATIENT INSTRUCTIONS
Personalized Preventive Plan for Rachelle Alarcon - 3/8/2021  Medicare offers a range of preventive health benefits. Some of the tests and screenings are paid in full while other may be subject to a deductible, co-insurance, and/or copay. Some of these benefits include a comprehensive review of your medical history including lifestyle, illnesses that may run in your family, and various assessments and screenings as appropriate. After reviewing your medical record and screening and assessments performed today your provider may have ordered immunizations, labs, imaging, and/or referrals for you. A list of these orders (if applicable) as well as your Preventive Care list are included within your After Visit Summary for your review. Other Preventive Recommendations:    · A preventive eye exam performed by an eye specialist is recommended every 1-2 years to screen for glaucoma; cataracts, macular degeneration, and other eye disorders. · A preventive dental visit is recommended every 6 months. · Try to get at least 150 minutes of exercise per week or 10,000 steps per day on a pedometer . · Order or download the FREE \"Exercise & Physical Activity: Your Everyday Guide\" from [employer]. Call 5-657.791.9895 or search [employer] online. · You need 2257-3175 mg of calcium and 6864-8187 IU of vitamin D per day. It is possible to meet your calcium requirement with diet alone, but a vitamin D supplement is usually necessary to meet this goal.  · When exposed to the sun, use a sunscreen that protects against both UVA and UVB radiation with an SPF of 30 or greater. Reapply every 2 to 3 hours or after sweating, drying off with a towel, or swimming. · Always wear a seat belt when traveling in a car. Always wear a helmet when riding a bicycle or motorcycle.

## 2021-03-08 NOTE — PROGRESS NOTES
Medicare Annual Wellness Visit  Name: Daune Barthel Date: 3/8/2021   MRN: [id number] Sex: Male   Age: 72 y.o. Ethnicity: Non-/Non    : 1955 Race: Black      Luis Carlos Majano is here for Medicare AWV    Screenings for behavioral, psychosocial and functional/safety risks, and cognitive dysfunction are all negative except as indicated below. These results, as well as other patient data from the [address] form, are documented in Flowsheets linked to this Encounter. Allergies   Allergen Reactions    Penicillins Other (See Comments)     Unknown reaction                                                     Prior to Visit Medications    Medication Sig Taking? Authorizing Provider   lidocaine (LIDODERM) 5 % Place 1 patch onto the skin daily for 10 days 12 hours on, 12 hours off. RADHA Acosta CNP   fluticasone (FLONASE) 50 MCG/ACT nasal spray 1 spray by Each Nostril route daily  Lobo Raphael PA-C   atorvastatin (LIPITOR) 10 MG tablet Take 1 tablet by mouth daily  Oleg Lyles DO   aspirin 81 MG chewable tablet Take 81 mg by mouth daily  Historical Provider, MD   ibuprofen (ADVIL;MOTRIN) 800 MG tablet Take 1 tablet by mouth every 6 hours as needed for Pain  Jay Graves MD   ibuprofen (ADVIL;MOTRIN) 600 MG tablet Take 1 tablet by mouth every 6 hours as needed for Pain  Delma Marcum MD   diclofenac sodium (VOLTAREN) 1 % GEL Apply 2 g topically 4 times daily  RADHA Abbott CNP   Multiple Vitamins-Minerals (CENTRUM PO) Take by mouth   Historical Provider, MD   Aspirin-Acetaminophen-Caffeine (EXCEDRIN PO) Take by mouth as needed Q8 hrs.    Historical Provider, MD         Past Medical History:   Diagnosis Date    Anxiety     Chronic right-sided low back pain with right-sided sciatica 3/21/2019    Elbow pain     Right hand pain        Past Surgical History:   Procedure Laterality Date    ARTHRODESIS      Right 3rd PIP joint    EPIDURAL STEROID INJECTION Right 4/11/2019    L5 S1 EPIDURAL STEROID INJECTION performed by Dayanara Kim DO at [address]      fusion    SHOULDER SURGERY      Right AC joint surgery         Family History   Problem Relation Age of Onset    Heart Disease Father         64    Stroke Mother        CareTeam (Including outside providers/suppliers regularly involved in providing care):   Patient Care Team:  Toyin Arambula PA-C as PCP - General (Physician Assistant)  Toyin Arambula PA-C as PCP - [facility]    Wt Readings from Last 3 Encounters:   03/08/21 206 lb (93.4 kg)   02/28/21 199 lb (90.3 kg)   01/29/21 199 lb (90.3 kg)     Vitals:    03/08/21 0954   BP: 124/82   Site: Left Upper Arm   Position: Sitting   Cuff Size: Large Adult   Pulse: 76   Resp: 18   Temp: 97.2 °F (36.2 °C)   TempSrc: Infrared   SpO2: 96%   Weight: 206 lb (93.4 kg)   Height: 5' 9\" (1.753 m)     Body mass index is 30.42 kg/m². Based upon direct observation of the patient, evaluation of cognition reveals recent and remote memory intact.     General Appearance: alert and oriented to person, place and time, well developed and well- nourished, in no acute distress  Skin: warm and dry, no rash or erythema  Head: normocephalic and atraumatic  Eyes: pupils equal, round, and reactive to light, extraocular eye movements intact, conjunctivae normal  ENT: tympanic membrane, external ear and ear canal normal bilaterally, nose without deformity, nasal mucosa and turbinates normal without polyps  Neck: supple and non-tender without mass, no thyromegaly or thyroid nodules, no cervical lymphadenopathy  Pulmonary/Chest: clear to auscultation bilaterally- no wheezes, rales or rhonchi, normal air movement, no respiratory distress  Cardiovascular: normal rate, regular rhythm, normal S1 and S2, no murmurs, rubs, clicks, or gallops, distal pulses intact, no carotid bruits  Abdomen: soft, non-tender, non-distended, normal bowel sounds, no masses or organomegaly  Extremities: no cyanosis, clubbing or edema  Musculoskeletal: normal range of motion, no joint swelling, deformity or tenderness  Neurologic: reflexes normal and symmetric, no cranial nerve deficit, gait, coordination and speech normal    Patient's complete Health Risk Assessment and screening values have been reviewed and are found in Flowsheets. The following problems were reviewed today and where indicated follow up appointments were made and/or referrals ordered. Positive Risk Factor Screenings with Interventions:         Substance History:  Social History     Tobacco History     Smoking Status  Current Some Day Smoker Smoking Frequency  0.5 packs/day for 35 years (17.5 pk yrs) Smoking Tobacco Type  Cigarettes    Smokeless Tobacco Use  Never Used          Alcohol History     Alcohol Use Status  No          Drug Use     Drug Use Status  Not Currently Types  Cocaine Comment  denies          Sexual Activity     Sexually Active  Not Asked               Alcohol Screening:       A score of 8 or more is associated with harmful or hazardous drinking. A score of 13 or more in women, and 15 or more in men, is likely to indicate alcohol dependence. Substance Abuse Interventions:  · Tobacco abuse:  tobacco cessation tips and resources provided 1/2 ppd  - no alcohol use  General Health and ACP:  General  In general, how would you say your health is?: Very Good  In the past 7 days, have you experienced any of the following?  New or Increased Pain, New or Increased Fatigue, Loneliness, Social Isolation, Stress or Anger?: (!) New or Increased Pain  Do you get the social and emotional support that you need?: Yes  Do you have a Living Will?: (!) No  Advance Directives     Power of  Living Will ACP-Advance Directive ACP-Power of     Not on File Filed on 05/02/19 Filed Not on File      General Health Risk Interventions:  · Pain issues: right shoulder, seeing Ortho    Health Habits/Nutrition:  Health this visit:    Colon cancer screening  -     POCT Fit Test; Future  -     POCT Fit Test    Routine general medical examination at a health care facility

## 2021-03-17 ENCOUNTER — HOSPITAL ENCOUNTER (OUTPATIENT)
Dept: PHYSICAL THERAPY | Age: 66
Setting detail: THERAPIES SERIES
Discharge: HOME | End: 2021-03-17
Payer: MEDICARE

## 2021-03-17 PROCEDURE — 97161 PT EVAL LOW COMPLEX 20 MIN: CPT | Performed by: PHYSICAL THERAPIST

## 2021-03-17 ASSESSMENT — PAIN DESCRIPTION - DESCRIPTORS: DESCRIPTORS: ACHING;CONSTANT

## 2021-03-17 ASSESSMENT — PAIN DESCRIPTION - PAIN TYPE: TYPE: CHRONIC PAIN

## 2021-03-17 NOTE — PROGRESS NOTES
[address] [facility]                Phone: [phone]   Fax: [phone]    Physical Therapy Daily Treatment Note  Date:  3/17/2021    Patient Name:  Quincy Sanchez    :  1955  MRN: [id number]    Evaluating therapist:  DEVONTE Manzo                        (3/17/21)  Restrictions/Precautions:    Diagnosis:  chronic L hip/LBP  Treatment Diagnosis:    Insurance/Certification information:  Abbey Holland     Referring Physician:  Hemanth George Plan of care signed (Y/N):    Visit# / total visits:  1/                            (4/15/21)  Pain level: 8/10   Time In:  Time Out:    Subjective:      Exercises:  Exercise/Equipment Resistance/Repetitions Other comments   StepOne              tball flex/rot            rot st     SKTC     piriformis st            seated hip add                       abd                       flex     sit/stand            step ups              side                                    Other Therapeutic Activities:      Home Exercise Program:      Manual Treatments:      Modalities:  MH to LB/L hip PRN    Timed Code Treatment Minutes: Total Treatment Minutes:      Treatment/Activity Tolerance:  [] Patient tolerated treatment well [] Patient limited by fatique  [] Patient limited by pain  [] Patient limited by other medical complications  [] Other:     Prognosis: [] Good [] Fair  [] Poor    Patient Requires Follow-up: [] Yes  [] No    Plan:   [] Continue per plan of care [] Alter current plan (see comments)  [] Plan of care initiated [] Hold pending MD visit [] Discharge  Plan for Next Session:      See Weekly Progress Note: []  Yes  []  No  Next due:        Electronically signed by:   Ab Adler

## 2021-03-17 NOTE — PROGRESS NOTES
Physical Therapy  Initial Assessment  Date: 3/17/2021  Patient Name: Francesco Melgar  MRN: [id number]  : 1955           Subjective   General  Chart Reviewed: Yes  Referring Practitioner: Donavon Frye Referral Date : 21  Diagnosis: chronic L hip pain  PT Visit Information  Onset Date: 21  PT Insurance Information: Brittanie Cardenas  Total # of Visits Approved: 6-8  Total # of Visits to Date: 1  Subjective  Subjective: pt presents to therapy with c/o L hip pain for several months of insidious onset; no PMH for L hip/LB injury/sx per pt; x-ray of L hip + for degenerative changes, LB + for degenerative changes L4-5, L5-S1; MEDS help somewhat; no c/o N/T into L LE or issue with leg buckling; sleep is hampered due to aching; RTD for follow-up 3/30/21  Pain Screening  Patient Currently in Pain: Yes  Pain Assessment  Pain Level: 8  Pain Type: Chronic pain  Pain Location: Back; Hip  Pain Orientation: Left  Pain Descriptors: Aching;Constant  Functional Pain Assessment: Prevents or interferes with many active not passive activities  Vital Signs  Patient Currently in Pain: Yes    Objective     Observation/Palpation  Palpation: discomfort noted across lateral aspect of L hip into gluteal mass and LB paraspinals L1-5  Observation: posture:  level ASIS/PSIS/iliacs; ant pelvic tilt noted    AROM RLE (degrees)  RLE AROM: WNL  AROM LLE (degrees)  LLE AROM : WNL  AROM RUE (degrees)  RUE AROM : WNL  AROM LUE (degrees)  LUE AROM : WNL  Spine  Lumbar: grossly limited 25% WNL for all ranges with no c/o radiculopathy noted  Special Tests: hyperflex/rot   +/+; slump  -    Strength Other  Other: grossly 4/5 for all ranges L hip/knee, 5/5 across L ankle     Additional Measures  Other: endurance for all prolonged activities is GOOD  Sensation  Overall Sensation Status: WNL     Ambulation  Ambulation?: Yes  Ambulation 1  Surface: level tile  Device: No Device  Assistance: Independent  Quality of Gait: slight listing noted off L LE due to hip/LB pain/tightness  Balance  Comments: static/dynamic balance is GOOD     Assessment   Conditions Requiring Skilled Therapeutic Intervention  Body structures, Functions, Activity limitations: Decreased ROM; Decreased strength;Decreased endurance  Assessment: pain noted across L side LB/hip with all prolonged activities, 8/10  Prognosis: Fair  Decision Making: Low Complexity  Activity Tolerance  Activity Tolerance: Patient Tolerated treatment well       Plan   Plan  Times per week: pt to be seen 2x/week/3-4 weeks  Current Treatment Recommendations: ROM, Strengthening, Endurance Training, Modalities, Home Exercise Program    Goals  Time Frame for goals: 3-4 weeks  goal 1: decrease LB/L hip pain with all prolonged activities, 0-4/10  goal 2: restore LB AROM to WNL for all ranges  goal 3: increase strength across L hip to grossly 4+, 5/5 for all ranges improving static/dynamic balance to GOOD+  goal 4: improve endurance for all prolonged activities to GOOD  goal 5: assure I with HEP for home management of condition    Patient goals : to get rid of the pain across his hip/LB with all activities          Oneyda Renee, PT

## 2021-03-23 ENCOUNTER — HOSPITAL ENCOUNTER (OUTPATIENT)
Dept: PHYSICAL THERAPY | Age: 66
Setting detail: THERAPIES SERIES
Discharge: HOME | End: 2021-03-23
Payer: MEDICARE

## 2021-03-23 PROCEDURE — 97110 THERAPEUTIC EXERCISES: CPT | Performed by: PHYSICAL THERAPIST

## 2021-03-23 PROCEDURE — 97530 THERAPEUTIC ACTIVITIES: CPT | Performed by: PHYSICAL THERAPIST

## 2021-03-23 NOTE — PROGRESS NOTES
[address] [facility]                Phone: [phone]   Fax: [phone]    Physical Therapy Daily Treatment Note  Date:  3/23/2021    Patient Name:  Alexandra Aguirre    :  1955  MRN: [id number]    Evaluating therapist:  DEVONTE Ceballos                        (3/17/21)  Restrictions/Precautions:    Diagnosis:  chronic L hip/LBP  Treatment Diagnosis:    Insurance/Certification information:  Al Cordon     Referring Physician:  Duane Deutscher Plan of care signed (Y/N):    Visit# / total visits:  2/5                            (4/15/21)  Pain level: 8/10   Time In:  930  Time Out:  1013    Subjective:      Exercises:  Exercise/Equipment Resistance/Repetitions Other comments   StepOne   10 min            tball flex/rot 10x10s           rot st 3x20s    SKTC 3x20s    piriformis st 3x20s           seated hip abd [id number]                      flex [id number]         sit/stand 3x10           step ups 2x10x6\"             side  2x10x6\"                                  Other Therapeutic Activities:      Home Exercise Program:      Manual Treatments:      Modalities:  MH to LB/L hip PRN    Timed Code Treatment Minutes: Total Treatment Minutes:      Treatment/Activity Tolerance:  [] Patient tolerated treatment well [] Patient limited by fatique  [] Patient limited by pain  [] Patient limited by other medical complications  [] Other:     Prognosis: [] Good [] Fair  [] Poor    Patient Requires Follow-up: [] Yes  [] No    Plan:   [] Continue per plan of care [] Alter current plan (see comments)  [] Plan of care initiated [] Hold pending MD visit [] Discharge  Plan for Next Session:      See Weekly Progress Note: []  Yes  []  No  Next due:        Electronically signed by:   Demario Pickens

## 2021-03-25 ENCOUNTER — HOSPITAL ENCOUNTER (OUTPATIENT)
Dept: PHYSICAL THERAPY | Age: 66
Setting detail: THERAPIES SERIES
Discharge: HOME | End: 2021-03-25
Payer: MEDICARE

## 2021-03-25 PROCEDURE — 97110 THERAPEUTIC EXERCISES: CPT | Performed by: PHYSICAL THERAPIST

## 2021-03-25 PROCEDURE — 9900000074 HC THERAPEUTIC ACTIVITIES PER 15 MIN (SELF-PAY): Performed by: PHYSICAL THERAPIST

## 2021-03-25 NOTE — PROGRESS NOTES
[address] [facility]                Phone: [phone]   Fax: [phone]    Physical Therapy Daily Treatment Note  Date:  3/25/2021    Patient Name:  Kristie Rizzo    :  1955  MRN: [id number]    Evaluating therapist:  DEVONTE Monteiro                        (3/17/21)  Restrictions/Precautions:    Diagnosis:  chronic L hip/LBP  Treatment Diagnosis:    Insurance/Certification information:  Aster Bear     Referring Physician:  Neha Bello Plan of care signed (Y/N):    Visit# / total visits:  3/5                            (4/15/21)  Pain level: 8/10   Time In:  930  Time Out:  1020    Subjective:      Exercises:  Exercise/Equipment Resistance/Repetitions Other comments   StepOne   10 min            tball flex/rot 10x10s           rot st 3x20s    SKTC 3x20s    piriformis st 3x20s           seated hip abd [id number]                      flex [id number]         sit/stand 3x10           step ups 2x10x6\"             side  2x10x6\"                                  Other Therapeutic Activities:      Home Exercise Program:      Manual Treatments:      Modalities:  MH to LB/L hip PRN    Timed Code Treatment Minutes: Total Treatment Minutes:      Treatment/Activity Tolerance:  [] Patient tolerated treatment well [] Patient limited by fatique  [] Patient limited by pain  [] Patient limited by other medical complications  [] Other:     Prognosis: [] Good [] Fair  [] Poor    Patient Requires Follow-up: [] Yes  [] No    Plan:   [] Continue per plan of care [] Alter current plan (see comments)  [] Plan of care initiated [] Hold pending MD visit [] Discharge  Plan for Next Session:      See Weekly Progress Note: []  Yes  []  No  Next due:        Electronically signed by:   Luis M Adam

## 2021-03-25 NOTE — PROGRESS NOTES
S:  pt presents to therapy for two of two scheduled visits this week; at this time  he reports that his hip/LB continue to ache with most prolonged activities; pain level given as 8/10 and remains constant in nature; no c/o buckling or LOB over last week's time; sleep remains hampered; HEP going well per pt    O:  performed the exercises/tasks as written in the flowsheet for the week ending 3/26/21; initiated HEP for home management of condition; LB AROM grossly 80% WNL for all ranges; strength across L hip grossly 4, 4+/5 for all ranges    A:  marita tx well; pt able to perform all requested tasks with good form and pacing noted; L hip strength and LB AROM grossly stable since eval; gait stable with near normal mechanics noted L LE; static/dynamic balance is GOOD; endurance for all prolonged activities is GOOD    P:  cont with POC of stretching/strengthening for trunk/R hip as pt tolerates

## 2021-03-28 ENCOUNTER — HOSPITAL ENCOUNTER (EMERGENCY)
Age: 66
Discharge: HOME | End: 2021-03-28
Payer: MEDICARE

## 2021-03-28 ENCOUNTER — APPOINTMENT (OUTPATIENT)
Dept: GENERAL RADIOLOGY | Age: 66
End: 2021-03-28
Payer: MEDICARE

## 2021-03-28 VITALS
DIASTOLIC BLOOD PRESSURE: 86 MMHG | OXYGEN SATURATION: 96 % | WEIGHT: 206 LBS | SYSTOLIC BLOOD PRESSURE: 148 MMHG | TEMPERATURE: 97 F | HEART RATE: 90 BPM | HEIGHT: 69 IN | BODY MASS INDEX: 30.51 KG/M2 | RESPIRATION RATE: 18 BRPM

## 2021-03-28 DIAGNOSIS — M25.511 RIGHT SHOULDER PAIN, UNSPECIFIED CHRONICITY: Primary | ICD-10-CM

## 2021-03-28 DIAGNOSIS — Z20.822 LAB TEST NEGATIVE FOR COVID-19 VIRUS: ICD-10-CM

## 2021-03-28 LAB — SARS-COV-2, NAAT: NOT DETECTED

## 2021-03-28 PROCEDURE — 6370000000 HC RX 637 (ALT 250 FOR IP): Performed by: NURSE PRACTITIONER

## 2021-03-28 PROCEDURE — 87635 SARS-COV-2 COVID-19 AMP PRB: CPT

## 2021-03-28 PROCEDURE — 73030 X-RAY EXAM OF SHOULDER: CPT

## 2021-03-28 PROCEDURE — 99284 EMERGENCY DEPT VISIT MOD MDM: CPT

## 2021-03-28 RX ORDER — HYDROCODONE BITARTRATE AND ACETAMINOPHEN 5; 325 MG/1; MG/1
1 TABLET ORAL ONCE
Status: COMPLETED | OUTPATIENT
Start: 2021-03-28 | End: 2021-03-28

## 2021-03-28 RX ORDER — HYDROCODONE BITARTRATE AND ACETAMINOPHEN 5; 325 MG/1; MG/1
1 TABLET ORAL EVERY 8 HOURS PRN
Qty: 6 TABLET | Refills: 0 | Status: SHIPPED | OUTPATIENT
Start: 2021-03-28 | End: 2021-03-31

## 2021-03-28 RX ADMIN — HYDROCODONE BITARTRATE AND ACETAMINOPHEN 1 TABLET: 5; 325 TABLET ORAL at 13:30

## 2021-03-28 ASSESSMENT — PAIN DESCRIPTION - DESCRIPTORS: DESCRIPTORS: ACHING

## 2021-03-28 ASSESSMENT — PAIN DESCRIPTION - LOCATION: LOCATION: SHOULDER

## 2021-03-28 NOTE — ED PROVIDER NOTES
[address]  Department of Emergency Medicine   ED  Encounter Note  Admit Date/RoomTime: 3/28/2021  1:01 PM  ED Room: CORI/CORI    NAME: Red Cuevas  : 1955  MRN: [id number]     Chief Complaint:  Shoulder Pain (right shoulder pain; previous surgery last october) and Other (needs covid test for surgery in [address] [place] [address] next week)    History of Present Illness       Red Cuevas is a 72 y.o. old male who presents to the emergency department for complaint of needing a COVID-19 test and for continued right shoulder pain. Patient reports that he had surgery on his right shoulder in 2020. Denies any injury or trauma to his right shoulder. Reports that he is presently being seen at [address] [place] [address] clinic and has an appointment in 2 days to be seen. States that they are requiring that he has a negative COVID-19 test prior to his appointment. States that he was previously using Norco for breakthrough right shoulder pain. States that he is out of his [employer] was last prescribed 1 month ago and he was only given 3-day supply. No fever or chills. Denies numbness, tingling, paresthesias, nausea, vomiting, chest pain, shortness of breath, back pain, neck pain, lightheadedness, dizziness, syncope, or other complaints at this time. Denies cough, congestion, loss of smell or taste, diarrhea, or any concern for COVID-19. He is right handed. The patients tetanus status is up to date. Since onset the symptoms have been waxing and waning. His pain is aggraveated by any movement and relieved by nothing. Sarai Jean Claude ROS   Pertinent positives and negatives are stated within HPI, all other systems reviewed and are negative. Past Medical History:  has a past medical history of Anxiety, Chronic right-sided low back pain with right-sided sciatica, Elbow pain, and Right hand pain. Surgical History:  has a past surgical history that includes shoulder surgery; arthrodesis;  Finger surgery; and epidural steroid injection (Right, 4/11/2019). Social History:  reports that he has been smoking cigarettes. He has a 17.50 pack-year smoking history. He has never used smokeless tobacco. He reports previous drug use. Drug: Cocaine. He reports that he does not drink alcohol. Family History: family history includes Heart Disease in his father; Stroke in his mother. Allergies: Penicillins    Physical Exam   Oxygen Saturation Interpretation: Normal.        ED Triage Vitals   BP Temp Temp Source Pulse Resp SpO2 Height Weight   03/28/21 1310 03/28/21 1300 03/28/21 1300 03/28/21 1300 03/28/21 1300 03/28/21 1300 03/28/21 1310 03/28/21 1310   (!) 148/86 97 °F (36.1 °C) Infrared 94 13 96 % 5' 9\" (1.753 m) 206 lb (93.4 kg)         Constitutional:  Alert, development consistent with age. Neck:  Normal ROM. Supple. Non-tender. Shoulder:  Right lateral.              Tenderness: mild              Swelling: None. Deformity: no.              ROM: full range with pain. Skin: No erythema or swelling. He has a well-healed scars present. Neurovascular: Motor deficit: none. Sensory deficit: none. Pulse deficit: none. Capillary refill: normal.    Elbow:              Tenderness:  none. Swelling: None. Deformity: no.              ROM: full painless ROM. Skin:  normal exam; no wounds, erythema, or swelling. No neurovascular deficits. No motor or sensory deficits. Compartments soft and compressible. Lymphatics: No lymphangitis or edema noted  Neurological:  Oriented. Motor functions intact.     Lab / Imaging Results   (All laboratory and radiology results have been personally reviewed by myself)  Labs:  Results for orders placed or performed during the hospital encounter of 03/28/21   COVID-19, Rapid    Specimen: Nasopharyngeal Swab   Result Value Ref Range    SARS-CoV-2, NAAT Not Detected Not Detected     Imaging: All Radiology results interpreted by Radiologist unless otherwise noted. XR SHOULDER RIGHT (MIN 2 VIEWS)   Final Result   No acute bony pathology. Stable absence of the distal clavicle. ED Course / Medical Decision Making     Medications   HYDROcodone-acetaminophen (NORCO) 5-325 MG per tablet 1 tablet (1 tablet Oral Given 3/28/21 1330)          Consult(s):   None    Procedure(s):   none    MDM:      Amy Larios is a 41-year-old male who presented to the emergency department for complaint of right shoulder pain. Reported history of right shoulder surgery 10/2020. He reported needing a COVID-19 test so that he could have follow-up in [facility] clinic. He reported having an appointment on 3/30/2021 in [email or web address] due to chronic ongoing pain in his right shoulder. Denied any new injury or trauma. No neurovascular deficits. No motor or sensory deficits. Compartments soft and compressible. A rapid COVID-19 test was completed and found to be negative. Imaging of right shoulder reassuring for no acute osseous abnormalities or malalignment. He was given Norco while in the ED and prescribed short-term Norco prescription. [place] report was reviewed and noted that he was prescribed Norco 1 month ago, which was a short-term 3-day supply. I did discuss with him in detail that he has been seen in the ED several times for chronic shoulder pain and the ED is not intended to treat and manage chronic pain. He verbalized understanding. He reported that due to not having a Covid test he was unable to be seen in [facility] as he was previously scheduled. He reported that he will follow up as scheduled on the 30th for further evaluation and pain management. He remained hemodynamically stable, nontoxic, and appropriate for outpatient management. Advised to return for any new, change, worsening symptoms or concerns.   At this time the patient is without objective evidence of an acute process requiring hospitalization or inpatient management. They have remained hemodynamically stable throughout their entire ED visit and are stable for discharge with outpatient follow-up. The plan has been discussed in detail and they are aware of the specific conditions for emergent return, as well as the importance of follow-up. Plan of Care/Counseling:  I reviewed today's visit with the patient in addition to providing specific details for the plan of care and counseling regarding the diagnosis and prognosis. Questions are answered at this time and are agreeable with the plan. Assessment      1. Right shoulder pain, unspecified chronicity    2. Lab test negative for COVID-19 virus      Plan   Discharge home. Patient condition is good    New Medications     New Prescriptions    HYDROCODONE-ACETAMINOPHEN (NORCO) 5-325 MG PER TABLET    Take 1 tablet by mouth every 8 hours as needed for Pain for up to 3 days. Intended supply: 3 days. Take lowest dose possible to manage pain     Electronically signed by RADHA Diaz CNP   DD: 3/28/21  **This report was transcribed using voice recognition software. Every effort was made to ensure accuracy; however, inadvertent computerized transcription errors may be present.   END OF ED PROVIDER NOTE         RADHA Diaz CNP  03/28/21 4053

## 2021-03-28 NOTE — ED NOTES
Pt alert and oriented x4. Speech clear. Respirations easy/unlabored. Skin warm/dry. Appropriate color. No signs of acute distress noted. Pt/family teaching provided; verbalized understanding. Pt stable for discharge.        Carolyn Duverney, RN  03/28/21 5801

## 2021-04-04 ENCOUNTER — HOSPITAL ENCOUNTER (EMERGENCY)
Age: 66
Discharge: HOME | End: 2021-04-04
Payer: MEDICARE

## 2021-04-04 VITALS
TEMPERATURE: 97.5 F | WEIGHT: 202 LBS | BODY MASS INDEX: 29.83 KG/M2 | RESPIRATION RATE: 18 BRPM | SYSTOLIC BLOOD PRESSURE: 151 MMHG | OXYGEN SATURATION: 96 % | HEART RATE: 99 BPM | DIASTOLIC BLOOD PRESSURE: 93 MMHG

## 2021-04-04 DIAGNOSIS — M54.50 ACUTE EXACERBATION OF CHRONIC LOW BACK PAIN: Primary | ICD-10-CM

## 2021-04-04 DIAGNOSIS — G89.29 ACUTE EXACERBATION OF CHRONIC LOW BACK PAIN: Primary | ICD-10-CM

## 2021-04-04 PROCEDURE — U0005 INFEC AGEN DETEC AMPLI PROBE: HCPCS

## 2021-04-04 PROCEDURE — U0003 INFECTIOUS AGENT DETECTION BY NUCLEIC ACID (DNA OR RNA); SEVERE ACUTE RESPIRATORY SYNDROME CORONAVIRUS 2 (SARS-COV-2) (CORONAVIRUS DISEASE [COVID-19]), AMPLIFIED PROBE TECHNIQUE, MAKING USE OF HIGH THROUGHPUT TECHNOLOGIES AS DESCRIBED BY CMS-2020-01-R: HCPCS

## 2021-04-04 PROCEDURE — 99283 EMERGENCY DEPT VISIT LOW MDM: CPT

## 2021-04-04 RX ORDER — HYDROCODONE BITARTRATE AND ACETAMINOPHEN 5; 325 MG/1; MG/1
1 TABLET ORAL EVERY 8 HOURS PRN
Qty: 9 TABLET | Refills: 0 | Status: SHIPPED | OUTPATIENT
Start: 2021-04-04 | End: 2021-04-07 | Stop reason: SDUPTHER

## 2021-04-04 RX ORDER — METHYLPREDNISOLONE 4 MG/1
TABLET ORAL
Qty: 21 TABLET | Status: SHIPPED | OUTPATIENT
Start: 2021-04-04 | End: 2021-04-10

## 2021-04-04 ASSESSMENT — PAIN DESCRIPTION - FREQUENCY: FREQUENCY: INTERMITTENT

## 2021-04-04 ASSESSMENT — PAIN DESCRIPTION - LOCATION: LOCATION: HIP

## 2021-04-04 ASSESSMENT — PAIN DESCRIPTION - PAIN TYPE: TYPE: CHRONIC PAIN;ACUTE PAIN

## 2021-04-04 NOTE — ED PROVIDER NOTES
[facility]  Department of Emergency Medicine   ED  Encounter Note  Admit Date/RoomTime: 2021  1:00 PM  ED Room: [facility]    NAME: Clay Halsted  : 1955  MRN: [id number]     Chief Complaint:  Hip Pain (marshall nair sent thursday but didnt come. pain left hip \"found tumor with MRI\" pain too bad, has \"[facility] but didnt accept because didnt have ambulatory covid, had rapid but dont accept? )    History of Present Illness        Clay Halsted is a 72 y.o. old male with a prior history of chronic back pain, presents to the emergency department by private vehicle, for left-sided back pain which radiates to his left buttock. Patient states this is chronic and denies worsening pain. Patient denies injury. Patient states his symptoms are mild in severity and describes as an aching pain. Patient denies any make it better or worse. Patient states he is here for pain medication because he was unable to go to pain management this past week because he did not accept a rapid Covid test.  Patient also needs a send out Covid. Patient denies IV drug use, saddle paresthesias, loss of bowel or bladder function. Denies fever/chills, headache, vision change, dizziness, cough, chest pain, dyspnea, abdominal pain, NVD, numbness/weakness. ROS   Pertinent positives and negatives are stated within HPI, all other systems reviewed and are negative. Past Medical History:  has a past medical history of Anxiety, Chronic right-sided low back pain with right-sided sciatica, Elbow pain, and Right hand pain. Surgical History:  has a past surgical history that includes shoulder surgery; arthrodesis; Finger surgery; and epidural steroid injection (Right, 2019). Social History:  reports that he has been smoking cigarettes. He has a 17.50 pack-year smoking history. He has never used smokeless tobacco. He reports previous drug use. Drug: Cocaine.  He reports that he does not drink alcohol. Family History: family history includes Heart Disease in his father; Stroke in his mother. Allergies: Penicillins    Physical Exam   Oxygen Saturation Interpretation: Normal.        ED Triage Vitals   BP Temp Temp src Pulse Resp SpO2 Height Weight   04/04/21 1255 04/04/21 1242 -- 04/04/21 1242 04/04/21 1255 04/04/21 1242 -- 04/04/21 1255   (!) 151/93 97.5 °F (36.4 °C)  99 18 96 %  202 lb (91.6 kg)         Constitutional:  Alert, development consistent with age. HEENT:  NC/NT. Airway patent. Neck:  Normal ROM. Supple. Respiratory:  Clear to auscultation and breath sounds equal.  CV:  Regular rate and rhythm, normal heart sounds, without pathological murmurs, ectopy, gallops, or rubs. GI:  Abdomen Soft, nontender, good bowel sounds. No firm or pulsatile mass. Back: lower lumbar spine and sacral spine left sided. Tenderness: Mild. Swelling: no.              Range of Motion: full range of motion. CVA Tenderness: No.            Skin:  no erythema, rash or swelling noted. Distal Function:              Motor deficit: none. Sensory deficit: none. Pulse deficit: none. Calf Tenderness:  No Bilateral.               Edema:  none Both lower extremity(s). Deep Tendon Reflexes (DTR's) to bilateral knee's and ankle's are normo-reflexive   Gait:  normal.  Integument:  Normal turgor. Warm, dry, without visible rash. Lymphatics: No lymphangitis or adenopathy noted. Neurological:  Oriented. Motor functions intact. Lab / Imaging Results   (All laboratory and radiology results have been personally reviewed by myself)  Labs:  No results found for this visit on 04/04/21. Imaging: All Radiology results interpreted by Radiologist unless otherwise noted.   No orders to display       ED Course / Medical Decision Making   Medications - No data to display      Consult(s):   None    Procedure(s):   none    Medical Decision Making/Counseling: Patient presenting with chronic low back pain. Patient is in no acute distress, afebrile, nontoxic appearance. Patient denied red flag symptoms. Patient was unable to go to pain management this past week due to having a rapid Covid instead of a send out. Patient will get a few days worth of Norco but discussed with patient that he cannot keep coming back to the ED for refills on pain medicine. Patient received a send out Covid so he is able to get back to pain management next week. Recommended patient follow-up with pain management and PCP. Recommended patient return to the ED with new or worsening of symptoms. Assessment      1. Acute exacerbation of chronic low back pain      Plan   Discharge home. Patient condition is stable    New Medications     Discharge Medication List as of 4/4/2021  1:24 PM      START taking these medications    Details   HYDROcodone-acetaminophen (NORCO) 5-325 MG per tablet Take 1 tablet by mouth every 8 hours as needed for Pain for up to 3 days. Intended supply: 3 days. Take lowest dose possible to manage pain, Disp-9 tablet, R-0Print      methylPREDNISolone (MEDROL, JOIE,) 4 MG tablet Take as directed on package insert days 1-6, Disp-21 tablet, R-zeroPrint           Electronically signed by Mera Rain PA-C   DD: 4/4/21  **This report was transcribed using voice recognition software. Every effort was made to ensure accuracy; however, inadvertent computerized transcription errors may be present.   END OF ED PROVIDER NOTE     Mera Rain PA-C  04/04/21 2274

## 2021-04-05 ENCOUNTER — CARE COORDINATION (OUTPATIENT)
Dept: CARE COORDINATION | Age: 66
End: 2021-04-05

## 2021-04-05 LAB — SARS-COV-2, PCR: NOT DETECTED

## 2021-04-05 NOTE — CARE COORDINATION
Patient contacted regarding Jori Mota. Discussed COVID-19 related testing which was pending at this time. Test results were pending. Patient informed of results, if available? Yes    Ambulatory Care Manager contacted the patient by telephone to perform post discharge assessment. Call within 2 business days of discharge: Yes. Verified name and  with patient as identifiers. Provided introduction to self, and explanation of the CTN/ACM role, and reason for call due to risk factors for infection and/or exposure to COVID-19. Symptoms reviewed with patient who verbalized the following symptoms: pain or aching joints and left back and left hip pain. Due to no new or worsening symptoms encounter was not routed to provider for escalation. Discussed follow-up appointments. If no appointment was previously scheduled, appointment scheduling offered: Yes  [facility] follow up appointment(s):   Future Appointments   Date Time Provider Alise Guzman   2021 10:00 AM Thania Rivera, [address] [place] [address]   2021 12:00 PM LAINA Garcia [facility]     Non-[facility] follow up appointment(s): no    Non-face-to-face services provided:  Molly HERNANDEZ     Advance Care Planning:   Does patient have an Advance Directive:  decision maker updated. Patient has following risk factors of: Chronic pain synfrome, arthritis, cigarette smoker. ACM reviewed discharge instructions, medical action plan and red flags such as increased shortness of breath, increasing fever and signs of decompensation with patient who verbalized understanding. Discussed exposure protocols and quarantine with CDC Guidelines What to do if you are sick with coronavirus disease .  Patient was given an opportunity for questions and concerns. The patient agrees to contact the [facility] exposure line [phone], [facility] department [place] [facility]: ([phone]) and PCP office for questions related to their healthcare. ACM provided contact information for future needs. Reviewed and educated patient on any new and changed medications related to discharge diagnosis     Was patient discharged with a pulse oximeter? No Discussed and confirmed pulse oximeter discharge instructions and when to notify provider or seek emergency care. Patient/family/caregiver given information for [employer] and agrees to enroll no  Patient's preferred e-mail: no   Patient's preferred phone number: no  Based on Loop alert triggers, patient will be contacted by nurse care manager for worsening symptoms.    -Pt reports feeling better. He said he is relaxing today and not exerting. Left back & left hip pain is less. Pt filled prescriptions (Norco, Medrol Alvaro) and initiated. Pt said pain is less since medications initiated. Pt said his mobility is not quite baseline yet. No fever, cough, SOB or loss of taste/smell.   -Pt reports he needed a Covid test for [facility] referral appt. -Pt reports that his insurance changed. Pt is going to ask for a [facility] referral because [facility] takes this new insurance. He said the only reason he was going to [facility] was due to [facility] did not take his old insurance. He would rather go to an Ortho physician locally.   -Pt uses a TENS unit on his back.   -Pt continues to smoke cigarettes. He is considering stopping.    -Pt reports he has been going to PT twice a week. However, his transportation through his insurance did not picked him up last week either day. Pt did talk with his insurance concerning transportation. Transportation is suppose to call him 24 hours in advance that he will be picked up. Pt is scheduled for PT 4-7-2021. He said he is going to cancel the 4/7/21 PT appt. Then, talk with Carmen Gifford Wednesday and decide if more PT is needed at this time. -PCP appt 4-7-2021. Will ask for Covid test at PCP appt. -Offered MyChart, Pt declined. No email  -Decline LOOP.   No email or texting.   -Offered Care Coordination, Pt declined. Plan for follow-up call in 5-7 days based on severity of symptoms and risk factors.

## 2021-04-07 ENCOUNTER — HOSPITAL ENCOUNTER (OUTPATIENT)
Dept: PHYSICAL THERAPY | Age: 66
Setting detail: THERAPIES SERIES
Discharge: HOME | End: 2021-04-07
Payer: MEDICARE

## 2021-04-07 ENCOUNTER — OFFICE VISIT (OUTPATIENT)
Dept: FAMILY MEDICINE CLINIC | Age: 66
End: 2021-04-07
Payer: MEDICARE

## 2021-04-07 VITALS
HEIGHT: 69 IN | DIASTOLIC BLOOD PRESSURE: 72 MMHG | TEMPERATURE: 97.7 F | HEART RATE: 87 BPM | OXYGEN SATURATION: 95 % | SYSTOLIC BLOOD PRESSURE: 138 MMHG | BODY MASS INDEX: 30.66 KG/M2 | RESPIRATION RATE: 18 BRPM | WEIGHT: 207 LBS

## 2021-04-07 DIAGNOSIS — G89.29 ACUTE EXACERBATION OF CHRONIC LOW BACK PAIN: Primary | ICD-10-CM

## 2021-04-07 DIAGNOSIS — M25.511 CHRONIC RIGHT SHOULDER PAIN: ICD-10-CM

## 2021-04-07 DIAGNOSIS — G89.29 CHRONIC RIGHT SHOULDER PAIN: ICD-10-CM

## 2021-04-07 DIAGNOSIS — M54.50 ACUTE EXACERBATION OF CHRONIC LOW BACK PAIN: Primary | ICD-10-CM

## 2021-04-07 DIAGNOSIS — M46.1 SACROILIITIS (HCC): ICD-10-CM

## 2021-04-07 PROCEDURE — 99214 OFFICE O/P EST MOD 30 MIN: CPT | Performed by: PHYSICIAN ASSISTANT

## 2021-04-07 RX ORDER — HYDROCODONE BITARTRATE AND ACETAMINOPHEN 5; 325 MG/1; MG/1
1 TABLET ORAL EVERY 8 HOURS PRN
Qty: 15 TABLET | Refills: 0 | Status: SHIPPED | OUTPATIENT
Start: 2021-04-07 | End: 2021-04-12

## 2021-04-07 NOTE — PROGRESS NOTES
Chief Complaint   Patient presents with    Orders       HPI:    Patient complains of wanting to have referral to pain management locally for left sacroiliac joint pain and right shoulder pain. He also states that when he went locally before \"there was no professionalism. \" he then went to [facility]. Sacroiliac injections done in Dec and Jan. His hip pain improved. He has right shoulder ultrasound scheduled but he did not have it done. \"The only thing that worked was the hydrocodone. \" he asks for a \"small amount\" until he goes back to [place]. He was told to [facility] marshall to give you some\" from the ED. I reviewed all records from [facility] and advised the patient to continue in their care. I will not give him more norco after this. Patient's past medical, surgical, social and/or family history reviewed, updated in chart, and are non-contributory (unless otherwise stated). Medications and allergies also reviewed and updated in chart.     Review of Systems:  Constitutional:  No fever, no fatigue, no chills, no headaches, no weight change  Dermatology:  No rash, no mole, no dry or sensitive skin  ENT:  No cough, no sore throat, no sinus pain, no runny nose, no ear pain  Cardiology:  No chest pain, no palpitations, no leg edema, no shortness of breath, no PND  Gastroenterology:  No dysphagia, no abdominal pain, no nausea, no vomiting, no constipation, no diarrhea, no heartburn  Musculoskeletal:  + joint pain, no leg cramps, + back pain, no muscle aches  Respiratory:  No shortness of breath, no orthopnea, no wheezing, no HAMMOND, no hemoptysis  Urology:  No blood in the urine, no urinary frequency, no urinary incontinence, no urinary urgency, no nocturia, no dysuria    Vitals:    04/07/21 1200   BP: 138/72   Site: Right Upper Arm   Position: Sitting   Cuff Size: Large Adult   Pulse: 87   Resp: 18   Temp: 97.7 °F (36.5 °C)   TempSrc: Infrared   SpO2: 95%   Weight: 207 lb (93.9 kg)   Height: 5' 9\" (1.753 m)       Physical Exam  Constitutional:       General: He is not in acute distress. Appearance: He is well-developed. HENT:      Head: Normocephalic and atraumatic. Right Ear: External ear normal.      Left Ear: External ear normal.      Nose: Nose normal.   Eyes:      General: No scleral icterus. Conjunctiva/sclera: Conjunctivae normal.      Pupils: Pupils are equal, round, and reactive to light. Neck:      Musculoskeletal: Normal range of motion and neck supple. Thyroid: No thyromegaly. Cardiovascular:      Rate and Rhythm: Normal rate and regular rhythm. Heart sounds: Normal heart sounds. No murmur. Pulmonary:      Effort: Pulmonary effort is normal. No accessory muscle usage or respiratory distress. Breath sounds: Normal breath sounds. No wheezing. Musculoskeletal: Normal range of motion. Skin:     General: Skin is warm and dry. Findings: No rash. Neurological:      Mental Status: He is alert and oriented to person, place, and time. Deep Tendon Reflexes: Reflexes are normal and symmetric. Psychiatric:         Speech: Speech normal.         Behavior: Behavior normal.         Assessment/Plan:      Luis Carlos was seen today for orders. Diagnoses and all orders for this visit:    Acute exacerbation of chronic low back pain  -     HYDROcodone-acetaminophen (NORCO) 5-325 MG per tablet; Take 1 tablet by mouth every 8 hours as needed for Pain for up to 5 days. Intended supply: 3 days. Take lowest dose possible to manage pain    Sacroiliitis (HCC)    Chronic right shoulder pain  Controlled Substance Monitoring:    Acute and Chronic Pain Monitoring:   RX Monitoring 4/7/2021   Attestation -   Acute Pain Prescriptions Severe pain not adequately treated with lower dose. Periodic Controlled Substance Monitoring No signs of potential drug abuse or diversion identified. As above.   Call or go to ED immediately if symptoms worsen or persist.  Return if symptoms worsen or fail to improve. , or sooner if necessary. Educational materials and/or home exercises printed for patient's review and were included in patient instructions on his/her After Visit Summary and given to patient at the end of visit. Counseled regarding above diagnosis, including possible risks and complications,  especially if left uncontrolled. Counseled regarding the possible side effects, risks, benefits and alternatives to treatment; patient and/or guardian verbalizes understanding, agrees, feels comfortable with and wishes to proceed with above treatment plan. Advised patient to call with any new medication issues, and read all Rx info from pharmacy to assure aware of all possible risks and side effects of medication before taking. Reviewed age and gender appropriate health screening exams and vaccinations. Advised patient regarding importance of keeping up with recommended health maintenance and to schedule as soon as possible if overdue, as this is important in assessing for undiagnosed pathology, especially cancer, as well as protecting against potentially harmful/life threatening disease. Patient and/or guardian verbalizes understanding and agrees with above counseling, assessment and plan. All questions answered. Reyes Bazan PA-C  4/7/2021    I have personally reviewed and updated the chief complaint, HPI, Past Medical, Family and Social History, as well as the above Review of Systems.

## 2021-04-07 NOTE — PROGRESS NOTES
[facility]                Phone: [phone]  Fax: [phone]    Physical Therapy  Cancellation/No-show Note  Patient Name:  Neftaly Patel  :  1955   Date:  2021    For today's appointment patient:  [x]  Cancelled  []  Rescheduled appointment  []  No-show     Reason given by patient:  []  Patient ill  []  Conflicting appointment  [x]  No transportation    []  Conflict with work  []  No reason given  []  Other:     Comments:        Electronically signed by:   Zohra Woodward

## 2021-04-09 ENCOUNTER — HOSPITAL ENCOUNTER (OUTPATIENT)
Dept: PHYSICAL THERAPY | Age: 66
Setting detail: THERAPIES SERIES
Discharge: HOME | End: 2021-04-09
Payer: MEDICARE

## 2021-04-09 PROCEDURE — 97110 THERAPEUTIC EXERCISES: CPT | Performed by: PHYSICAL THERAPIST

## 2021-04-09 PROCEDURE — 97530 THERAPEUTIC ACTIVITIES: CPT | Performed by: PHYSICAL THERAPIST

## 2021-04-09 NOTE — PROGRESS NOTES
S:  pt presents to therapy for one of two scheduled visits this week, having cancelled on 4/7/21; at this time  he reports that his hip/LB continue to ache with most prolonged activities; pain level given as 8/10 and remains constant in nature; no c/o buckling or LOB over last week's time; sleep remains hampered; HEP going well per pt    O:  performed the exercises/tasks as written in the flowsheet for the week ending 4/9/21; initiated HEP for home management of condition; LB AROM grossly 80% WNL for all ranges; strength across L hip grossly 4, 4+/5 for all ranges    A:  marita tx well; pt able to perform all requested tasks with good form and pacing noted; L hip strength and LB AROM grossly stable since eval; gait stable with near normal mechanics noted L LE; static/dynamic balance is GOOD; endurance for all prolonged activities is GOOD    P:  cont with POC of stretching/strengthening for trunk/R hip as pt tolerates for one more visit with D/C to HEP at that time

## 2021-04-09 NOTE — PROGRESS NOTES
[facility]                Phone: [phone]   Fax: [phone]    Physical Therapy Daily Treatment Note  Date:  2021    Patient Name:  Gerardo Urban    :  1955  MRN: [id number]    Evaluating therapist:  DEVONTE Chandra                        (3/17/21)  Restrictions/Precautions:    Diagnosis:  chronic L hip/LBP  Treatment Diagnosis:    Insurance/Certification information:  Isaiah Arnett     Referring Physician:  Dixon Cortes Plan of care signed (Y/N):    Visit# / total visits:  4/5                            (4/15/21)  Pain level: 8/10   Time In:  1021  Time Out:  1100    Subjective:      Exercises:  Exercise/Equipment Resistance/Repetitions Other comments   StepOne   10 min            tball flex/rot 10x10s           rot st 3x20s    SKTC 3x20s    piriformis st 3x20s           seated hip abd [id number]                      flex [id number]         sit/stand 3x10           step ups 2x10x6\"             side  2x10x6\"                                  Other Therapeutic Activities:      Home Exercise Program:      Manual Treatments:      Modalities:  MH to LB/L hip PRN    Timed Code Treatment Minutes: Total Treatment Minutes:      Treatment/Activity Tolerance:  [] Patient tolerated treatment well [] Patient limited by fatique  [] Patient limited by pain  [] Patient limited by other medical complications  [] Other:     Prognosis: [] Good [] Fair  [] Poor    Patient Requires Follow-up: [] Yes  [] No    Plan:   [] Continue per plan of care [] Alter current plan (see comments)  [] Plan of care initiated [] Hold pending MD visit [] Discharge  Plan for Next Session:      See Weekly Progress Note: []  Yes  []  No  Next due:        Electronically signed by:   Ryne Andrews

## 2021-04-11 ENCOUNTER — HOSPITAL ENCOUNTER (EMERGENCY)
Age: 66
Discharge: HOME | End: 2021-04-11
Payer: MEDICARE

## 2021-04-11 VITALS
OXYGEN SATURATION: 99 % | DIASTOLIC BLOOD PRESSURE: 83 MMHG | HEART RATE: 99 BPM | BODY MASS INDEX: 30.51 KG/M2 | HEIGHT: 69 IN | RESPIRATION RATE: 18 BRPM | TEMPERATURE: 98 F | SYSTOLIC BLOOD PRESSURE: 138 MMHG | WEIGHT: 206 LBS

## 2021-04-11 DIAGNOSIS — Z20.822 COVID-19 VIRUS TEST RESULT UNKNOWN: Primary | ICD-10-CM

## 2021-04-11 PROCEDURE — U0005 INFEC AGEN DETEC AMPLI PROBE: HCPCS

## 2021-04-11 PROCEDURE — 99283 EMERGENCY DEPT VISIT LOW MDM: CPT

## 2021-04-11 PROCEDURE — U0003 INFECTIOUS AGENT DETECTION BY NUCLEIC ACID (DNA OR RNA); SEVERE ACUTE RESPIRATORY SYNDROME CORONAVIRUS 2 (SARS-COV-2) (CORONAVIRUS DISEASE [COVID-19]), AMPLIFIED PROBE TECHNIQUE, MAKING USE OF HIGH THROUGHPUT TECHNOLOGIES AS DESCRIBED BY CMS-2020-01-R: HCPCS

## 2021-04-11 NOTE — ED PROVIDER NOTES
[address] [place] [address]  Department of Emergency Medicine   ED  Encounter Note  Admit Date/RoomTime: 2021  1:05 PM  ED Room: [address]  NAME: Ngoc Blevins  : 1955  MRN: [id number]     Chief Complaint:  Covid Testing (needs covid test for surgery)    HISTORY OF PRESENT ILLNESS        Ngoc Blevins is a 72 y.o. male who presents to the ED for complaint of needing a Covid test for upcoming back procedure. Patient reports that he is presently under the care of [facility] and they informed him that he needed a send out Covid test so that they could see and evaluate him in 3 days. He reports that they told him that he had to have the test within 72 hours of his appointment. States that he has had 2 previous COVID-19 test in the past month. He reports both have been negative. Reports that he did receive both COVID-19 vaccines. Denies any other complaints. No fever or chills. No change in taste or smell. No cough or congestion. No chest pain or shortness of breath. No nausea, vomiting, diarrhea, or any other issues voiced. ROS   Pertinent positives and negatives are stated within HPI, all other systems reviewed and are negative. Past Medical History:  has a past medical history of Anxiety, Chronic right-sided low back pain with right-sided sciatica, Elbow pain, and Right hand pain. Surgical History:  has a past surgical history that includes shoulder surgery; arthrodesis; Finger surgery; and epidural steroid injection (Right, 2019). Social History:  reports that he has been smoking cigarettes. He has a 17.50 pack-year smoking history. He has never used smokeless tobacco. He reports previous drug use. Drug: Cocaine. He reports that he does not drink alcohol. Family History: family history includes Heart Disease in his father; Stroke in his mother.      Allergies: Penicillins    PHYSICAL EXAM   Oxygen Saturation Interpretation: Normal.        ED Triage Vitals   BP Temp Temp Source Pulse Resp SpO2 Height Weight   04/11/21 1302 04/11/21 1259 04/11/21 1259 04/11/21 1259 04/11/21 1302 04/11/21 1259 04/11/21 1302 04/11/21 1302   138/83 98 °F (36.7 °C) Tympanic 80 18 98 % 5' 9\" (1.753 m) 206 lb (93.4 kg)         Physical Exam  Constitutional/General: Alert and oriented x3, well appearing, non toxic  HEENT:  NC/NT. Airway patent. Neck: Supple, full ROM, non tender to palpation in the midline, no stridor, no crepitus, no meningeal signs  Respiratory: Lungs clear to auscultation bilaterally, no wheezes, rales, or rhonchi. Not in respiratory distress  CV:  Regular rate. Regular rhythm. No murmurs, gallops, or rubs. 2+ distal pulses  Chest: No chest wall tenderness  GI:  Abdomen Soft, Non tender, Non distended. +BS. No rebound, guarding, or rigidity. No pulsatile masses. Musculoskeletal: Moves all extremities x 4. Warm and well perfused, no clubbing, cyanosis, or edema. Capillary refill <3 seconds  Integument: skin warm and dry. No rashes. Lymphatic: no lymphadenopathy noted  Neurologic: GCS 15, no focal deficits, symmetric strength 5/5 in the upper and lower extremities bilaterally  Psychiatric: Normal Affect    Lab / Imaging Results   (All laboratory and radiology results have been personally reviewed by myself)  Labs:  Results for orders placed or performed during the hospital encounter of 04/11/21   Covid-19 Ambulatory   Result Value Ref Range    Source NP swab      Imaging: All Radiology results interpreted by Radiologist unless otherwise noted.   No orders to display       ED Course / Medical Decision Making   Medications - No data to display       Consult(s):   None    Procedure(s):   none    MDM:   Fariha Reyes is a 59-year-old male who presented to the emergency department for complaint of needing a COVID-19 test.  He reported that he is presently under the care of [facility] and has an appointment in 3 days for back procedure. He reported that he has been informed by their facility that he needs a COVID-19 test within 72 hours of his visit. Stated that a rapid Covid test is not acceptable. He had no other complaints. No fever or chills. No cough. No change in smell or taste. No chest pain or shortness of breath. He had no other complaints concerning factors with this visit. A COVID-19 PCR test was obtained and pending results. Reported that he has had both of his COVID-19 vaccine shots. He was instructed to follow-up with [facility] as scheduled. He remained hemodynamically stable, nontoxic, and appropriate for outpatient management. Advised to return for any new, change, worsening symptoms or concerns. Plan of Care/Counseling:  I reviewed today's visit with the patient in addition to providing specific details for the plan of care and counseling regarding the diagnosis and prognosis. Questions are answered at this time and are agreeable with the plan. ASSESSMENT     1. COVID-19 virus test result unknown      PLAN   Discharge home. Patient condition is good    New Medications     New Prescriptions    No medications on file     Electronically signed by RADHA Luna CNP   DD: 4/11/21  **This report was transcribed using voice recognition software. Every effort was made to ensure accuracy; however, inadvertent computerized transcription errors may be present.   END OF ED PROVIDER NOTE      RADHA Luna CNP  04/11/21 7335

## 2021-04-12 ENCOUNTER — CARE COORDINATION (OUTPATIENT)
Dept: CARE COORDINATION | Age: 66
End: 2021-04-12

## 2021-04-12 LAB — SARS-COV-2, PCR: NOT DETECTED

## 2021-04-12 RX ORDER — IBUPROFEN 200 MG
200 TABLET ORAL EVERY 6 HOURS PRN
COMMUNITY
End: 2021-05-24

## 2021-04-12 SDOH — HEALTH STABILITY: MENTAL HEALTH: HOW MANY STANDARD DRINKS CONTAINING ALCOHOL DO YOU HAVE ON A TYPICAL DAY?: NOT ASKED

## 2021-04-12 SDOH — ECONOMIC STABILITY: INCOME INSECURITY: HOW HARD IS IT FOR YOU TO PAY FOR THE VERY BASICS LIKE FOOD, HOUSING, MEDICAL CARE, AND HEATING?: NOT VERY HARD

## 2021-04-12 SDOH — SOCIAL STABILITY: SOCIAL NETWORK
IN A TYPICAL WEEK, HOW MANY TIMES DO YOU TALK ON THE PHONE WITH FAMILY, FRIENDS, OR NEIGHBORS?: MORE THAN THREE TIMES A WEEK

## 2021-04-12 SDOH — SOCIAL STABILITY: SOCIAL NETWORK: ARE YOU MARRIED, WIDOWED, DIVORCED, SEPARATED, NEVER MARRIED, OR LIVING WITH A PARTNER?: DIVORCED

## 2021-04-12 SDOH — HEALTH STABILITY: MENTAL HEALTH
STRESS IS WHEN SOMEONE FEELS TENSE, NERVOUS, ANXIOUS, OR CAN'T SLEEP AT NIGHT BECAUSE THEIR MIND IS TROUBLED. HOW STRESSED ARE YOU?: ONLY A LITTLE

## 2021-04-12 SDOH — ECONOMIC STABILITY: TRANSPORTATION INSECURITY
IN THE PAST 12 MONTHS, HAS THE LACK OF TRANSPORTATION KEPT YOU FROM MEDICAL APPOINTMENTS OR FROM GETTING MEDICATIONS?: YES

## 2021-04-12 NOTE — CARE COORDINATION
Ambulatory Care Coordination Note  4/12/2021  CM Risk Score: 8  Charlson 10 Year Mortality Risk Score: 10%     ACC: Lorenza Parker RN    Summary Note:   ACM contacted patient as a follow up to his ED visit. Patient presented to the ED d/t needing another COVID test before attending his scheduled appointment at [facility] (Pain Management/Ortho provider: Dr. Ashley Cuellar). Patient lives with his niece in an apartment. Patient lives in the basement where his bedroom, bath and laundry room are located. Patient is independent with his ADLS. Patient currently does not drive (His family and his insurance provides transportation). Patient does not use any assistive devices. Healthcare decision makers were reviewed, information is current. Patient does not have an email address, therefore, he is not active on My Chart. Patient's PCP is Nabeel Gomes and his pharmacy is [facility] on [address]. Medication review was completed on this outreach, patient does take his medications as prescribed. Patient last saw Nabeel Gomes on 04/07/21 as a follow up visit to his ED visit on 04/04/21. No follow up appointment was scheduled. Patient will see his PCP on an as needed basis. GENERAL - PAIN  -Patient has chronic left hip pain. He rates the pain as a 6/10 after exercising today.   -Patient is active with PT.   -Patient does attempt to exercise daily.   -Patient would like a referral to the RD. He feels weight loss may be helpful in managing his chronic hip pain.   -Patient's pain is being managed by Dr. Ashley Cuellar from [facility]. Patient does have an appointment scheduled with this provider on 04/14/21. SMOKING CESSATION  -Patient states he is aware he needs to stop smoking. He currently smokes approximately 1/2 ppd. PLAN  -Continue Care Coordination  -F/U to review outcome of upcoming [facility] appointment.   -Refer to RD  -F/U to review status of smoking and to provide ongoing education on smoking cessation.       Ambulatory Care Coordination Assessment    Care Coordination Protocol  Program Enrollment: Complex Care  Referral from Primary Care Provider: No  Week 1 - Initial Assessment     Do you have all of your prescriptions and are they filled?: Yes  Are you able to afford your medications?: Yes  How often do you have trouble taking your medications the way you have been told to take them?: I always take them as prescribed. Do you have Home O2 Therapy?: No      Ability to seek help/take action for Emergent Urgent situations i.e. fire, crime, inclement weather or health crisis. : Independent  Ability to ambulate to restroom: Independent  Ability handle personal hygeine needs (bathing/dressing/grooming): Independent  Ability to manage Medications: Independent  Ability to prepare Food Preparation: Independent  Ability to maintain home (clean home, laundry): Independent  Ability to drive and/or has transportation: Dependent  Ability to do shopping: Needs Assistance  Ability to manage finances:  Independent  Is patient able to live independently?: Yes     Current Housing: Apartment        Per the Fall Risk Screening, did the patient have 2 or more falls or 1 fall with injury in the past year?: No     Frequent urination at night?: Yes  Do you use rails/bars?: No  Do you have a non-slip tub mat?: No     Are you experiencing loss of meaning?: No  Are you experiencing loss of hope and peace?: No     Thinking about your patient's physical health needs, are there any symptoms or problems (risk indicators) you are unsure about that require further investigation?: No identified areas of uncertainly or problems already being investigated   Are the patients physical health problems impacting on their mental well-being?: No identified areas of concern   Are there any problems with your patients lifestyle behaviors (alcohol, drugs, diet, exercise) that are impacting on physical or mental well-being?: No identified areas of concern   Do you have any other concerns about your patients mental well-being? How would you rate their severity and impact on the patient?: No identified areas of concern   How would you rate their home environment in terms of safety and stability (including domestic violence, insecure housing, neighbor harassment)?: Consistently safe, supportive, stable, no identified problems   How do daily activities impact on the patient's well-being? (include current or anticipated unemployment, work, caregiving, access to transportation or other): No identified problems or perceived positive benefits   How would you rate their social network (family, work, friends)?: Adequate participation with social networks   How would you rate their financial resources (including ability to afford all required medical care)?: Financially secure, resources adequate, no identified problems   How wells does the patient now understand their health and well-being (symptoms, signs or risk factors) and what they need to do to manage their health?: Reasonable to good understanding and already engages in managing health or is willing to undertake better management   How well do you think your patient can engage in healthcare discussions? (Barriers include language, deafness, aphasia, alcohol or drug problems, learning difficulties, concentration): Clear and open communication, no identified barriers   Do other services need to be involved to help this patient?: Other care/services not in place and required   Suggested Interventions and Community Resources   Medi Set or Pill Pack: Declined   Physical Therapy: In Process   Registered Dietician: In Process   Smoking Cessation: In Process   Specialty Service Referral: Completed         Set up/Review an Education Plan, Set up/Review Goals              Prior to Admission medications    Medication Sig Start Date End Date Taking?  Authorizing Provider   ibuprofen (ADVIL;MOTRIN) 200 MG tablet Take 200 mg by mouth every 6 hours as needed for Pain Takes 2 tabs po q6h/prn for pain   Yes Historical Provider, MD   HYDROcodone-acetaminophen (NORCO) 5-325 MG per tablet Take 1 tablet by mouth every 8 hours as needed for Pain for up to 5 days. Intended supply: 3 days. Take lowest dose possible to manage pain 4/7/21 4/12/21 Yes Len Galicia PA-C   fluticasone [facility]) 50 MCG/ACT nasal spray 1 spray by Each Nostril route daily 1/29/21  Yes Len Galicia PA-C   atorvastatin (LIPITOR) 10 MG tablet Take 1 tablet by mouth daily 6/26/20  Yes Crystal Jauregui DO   aspirin 81 MG chewable tablet Take 81 mg by mouth daily   Yes Historical Provider, MD   diclofenac sodium (VOLTAREN) 1 % GEL Apply 2 g topically 4 times daily 4/7/20  Yes RADHA Decker - CNP   Aspirin-Acetaminophen-Caffeine (EXCEDRIN PO) Take by mouth as needed Q8 hrs.     Yes Historical Provider, MD   Multiple Vitamins-Minerals (CENTRUM PO) Take by mouth     Historical Provider, MD       Future Appointments   Date Time Provider Alise Guzman   4/13/2021 10:30 AM [facility], MATILDE Boland   4/20/2021 10:30 AM [facility], PT [facility]      and   General Assessment    Do you have any symptoms that are causing concern?: Yes  Progression since Onset: Intermittent - Waxing/Waning  Reported Symptoms: Pain (Comment: 04/12/21: left shoulder pain)

## 2021-04-12 NOTE — LETTER
4/12/2021    Luis Carlos [address] [facility]    Dear Deisy Ramírez,    I enjoyed speaking with you and wanted to send some additional information. Sharlene Sagastume PA-C and I will work together to ensure your needs are met and help you achieve your health goals. We are committed to walk with you on this journey and look forward to working with you. Please feel free to contact me with any questions or concerns. I am available by phone.     You can reach me at [phone]    In good health,     Sudheer Tirado RN

## 2021-04-13 ENCOUNTER — HOSPITAL ENCOUNTER (OUTPATIENT)
Dept: PHYSICAL THERAPY | Age: 66
Setting detail: THERAPIES SERIES
Discharge: HOME | End: 2021-04-13
Payer: MEDICARE

## 2021-04-13 NOTE — PROGRESS NOTES
[address] [facility]                Phone: [phone]  Fax: [phone]    Physical Therapy  Cancellation/No-show Note  Patient Name:  Soto Garcia  :  1955   Date:  2021    For today's appointment patient:  [x]  Cancelled  []  Rescheduled appointment  []  No-show     Reason given by patient:  []  Patient ill  [x]  Conflicting appointment  []  No transportation    []  Conflict with work  []  No reason given  []  Other:     Comments:      Electronically signed by:   Deandre Mcintyre

## 2021-04-14 ENCOUNTER — CARE COORDINATION (OUTPATIENT)
Dept: CARE COORDINATION | Age: 66
End: 2021-04-14

## 2021-04-14 NOTE — CARE COORDINATION
Contacted Aniyah Talbert regarding Dietitian referral. Pt answered, RD explained reason for call and role in care. Pt requested RD call back on a different day- pt prefers a call tomorrow 4/15. RD will contact pt at this time and follow up as appropriate.          [address] [facility], [address] [place] [address]

## 2021-04-15 ENCOUNTER — CARE COORDINATION (OUTPATIENT)
Dept: CARE COORDINATION | Age: 66
End: 2021-04-15

## 2021-04-15 NOTE — CARE COORDINATION
Contacted Leeanne Martin and left voicemail regarding Dietitian referral. Left call back number. RD outreached 4/14 and today 4/15. RD will update RN, Nacho Mcnair. RD will continue to follow/assist with patient return call.        [address] [facility], [facility]

## 2021-04-16 NOTE — CARE COORDINATION
Patient returned RD call. RD contacted patient and left voicemail regarding Dietitian referral. Left call back number and will follow up as appropriate.        [address] [facility], [address] [place] [address]

## 2021-04-19 ENCOUNTER — CARE COORDINATION (OUTPATIENT)
Dept: CARE COORDINATION | Age: 66
End: 2021-04-19

## 2021-04-19 NOTE — CARE COORDINATION
Ambulatory Care Coordination Note  4/19/2021  CM Risk Score: 8  Charlson 10 Year Mortality Risk Score: 10%     ACC: Ila Nair RN    Summary Note:   Patient contacted this ACM to discuss dissatisfaction with pain management provider at [facility]. Patient last saw PCP on 04/07/21. Patient will contact PCP on an as needed basis to schedule any follow up appointments. GENERAL - PAIN  -See assessment  -Patient did attend appointment at [facility] on 04/13/21. He was ordered Etodolac. Patient contacted the provider on 04/16/21 to inform him that he was having side effects d/t Etodolac (feeling woozy). Patient stated that Dr. Margret Hedrick told him if the Etodalac did not work, he would prescribe him Hydrocodone. When the patient called in to tell him he could not tolerate Etodalac, the provider recommended the use of OTC naproxen or ibuprofen or acetaminophen. Patient was upset stating that he has used these medications in the past and they do not manage his hib pain. Patient felt frustrated d/t he was \"promised\" by this provider that he would order Hydrocodone if Etodalac did not work, and then he stated he could not order this medication.  -Patient states his pain level is anywhere from 7-9/10 on a typical day. -Patient does have an appointment scheduled for his last PT session on 04/20/21. Patient states he is not going to attend this appointment d/t transportation. ACM encouraged patient to contact PT to r/s this appointment and inform them that he needs assistance with transportation. ACM strongly recommend that patient no be a \"no show\" for this appointment. Patient agreed to do so. -ACM discussed referral to ortho for chronic hip pain vs pain management. Patient feels pain management would be more helpful at this time than ortho. Patient does have a follow up appointment at [facility] scheduled for 05/26/21. SMOKING  -Patient states he is smoking 10-12 cigarettes per day.  He is aware of the benefits of smoking cessation.   -Patient is aware of his triggers that cause him to smoke. RD  -Patient states he has returned the RD voice messages. ACM educated patient to try to contact RD again, as he education may be very beneficial to determine a weight loss strategy. PLAN  -Continue Care Coordination  -F/U on status of pain. -F/U on status of smoking cessation. -F/U to determine if patient did cancel and r/s his final PT appointment. -F/U to determine if patient was able to talk to the RD. Care Coordination Interventions    Program Enrollment: Complex Care  Referral from Primary Care Provider: No  Suggested Interventions and Community Resources  Medi Set or Pill Pack: Declined  Physical Therapy: In Process (Comment: 04/12/21: Patient currently active with PT)  Registered Dietician: In Process (Comment: 04/12/21: Referral placed for weight loss)  Smoking Cessation: In Process  Specialty Services Referral: Completed (Comment: 04/12/21: Patient active in Pain Management through [facility])  Transportation Support: Completed         Goals Addressed                 This Visit's Progress       Patient Stated     Patient Stated (pt-stated)   On track     I will follow with provider to assist with pain management    Barriers: overwhelmed by complexity of regimen  Plan for overcoming my barriers: Care Coordination, F/U on status of pain  Confidence: 7/10  Anticipated Goal Completion Date: 07/12/21         Other     Conditions and Symptoms   On track     I will schedule office visits, as directed by my provider. I will keep my appointment or reschedule if I have to cancel. I will notify my provider of any symptoms that indicate a worsening of my condition.   R/T Pain    Barriers: overwhelmed by complexity of regimen and time constraints  Plan for overcoming my barriers: Care Coordination, Remind patient of upcoming appointments  Confidence: 8/10  Anticipated Goal Completion Date: 07/12/21         Nutrition Plan   No change     I will allow ACM to place a referral to RD for education on weight loss strategies    Barriers: overwhelmed by complexity of regimen and lack of education  Plan for overcoming my barriers: Care Coordination, Refer to RD  Confidence: 7/10  Anticipated Goal Completion Date: 07/12/21       Wellness Goal   On track     Patient Self-Management Goal for Health Maintenance  Goal: I will chose a goal related to tobacco cessation:  I will think about my triggers for smoking, I will think about reasons why I should quit smoking, I will eliminate tobacco products from my home and work environments, and I will avoid triggers and negative influences. Barriers: overwhelmed by complexity of regimen and stress  Plan for overcoming my barriers: Care Coordination, Educate on the benefits of Smoking Cessation  Confidence: 6/10  Anticipated Goal Completion Date: 07/12/21            Prior to Admission medications    Medication Sig Start Date End Date Taking? Authorizing Provider   ibuprofen (ADVIL;MOTRIN) 200 MG tablet Take 200 mg by mouth every 6 hours as needed for Pain Takes 2 tabs po q6h/prn for pain    Historical Provider, MD   fluticasone (FLONASE) 50 MCG/ACT nasal spray 1 spray by Each Nostril route daily 1/29/21   Tony Burt PA-C   atorvastatin (LIPITOR) 10 MG tablet Take 1 tablet by mouth daily 6/26/20   Nicolás Erps, DO   aspirin 81 MG chewable tablet Take 81 mg by mouth daily    Historical Provider, MD   diclofenac sodium (VOLTAREN) 1 % GEL Apply 2 g topically 4 times daily 4/7/20   Adarsh Grimm, APRN - CNP   Multiple Vitamins-Minerals (CENTRUM PO) Take by mouth     Historical Provider, MD   Aspirin-Acetaminophen-Caffeine (EXCEDRIN PO) Take by mouth as needed Q8 hrs.      Historical Provider, MD       Future Appointments   Date Time Provider Alise Guzman   4/20/2021 10:30 AM Christi Navarrete, PT [facility]      and   General Assessment

## 2021-04-20 ENCOUNTER — HOSPITAL ENCOUNTER (OUTPATIENT)
Dept: PHYSICAL THERAPY | Age: 66
Setting detail: THERAPIES SERIES
End: 2021-04-20
Payer: MEDICARE

## 2021-04-26 ENCOUNTER — CARE COORDINATION (OUTPATIENT)
Dept: CARE COORDINATION | Age: 66
End: 2021-04-26

## 2021-04-26 NOTE — CARE COORDINATION
Ambulatory Care Coordination Note  4/26/2021  CM Risk Score: 8  Charlson 10 Year Mortality Risk Score: 10%     ACC: Daniel Hair RN    Summary Note:   Patient contacted this ACM asking for assistance in scheduling an appointment with PCP, Domo Wright. ACM contacted Yamilka Lane from pre-service with the patient on the line and he did agree to the date and time that an appointment with his PCP was scheduled. AC did give patient the contact information for the RD. Patient states he does want to lose weight. -ACM reviewed smoking cessation with patient. He feels he can stop smoking without any assistance. He continues to smoke 10-12 cigarettes per day. GENERAL - PAIN  -See assessment  -Patient stated that his provider from [facility] did call him in 15 pills of Hydrocodone. He is trying to make this medication last, however, he does not feel it will last until he sees this provider on 05/27/21.   -Patient would like to see PCP to discuss his pain (lt hip) and the fact the the provider from [facility] only prescribed him a minimal number of pain pills. Patient did rate his pain as a 7/10 today.   -Patient continues with PT and ACM reminded him of his next scheduled appointment on 04/28/21.  -Patient states he is trying to exercise on a consistent basis. He states he did walk to the gym today and he received a ride home from a friend. PLAN  -Continue Care Coordination  -F/U to determine if patient did contact the RD.  -F/U to review status of smoking  -F/U to review status of pain and if PCP discussed alternate pain management recommendations. Care Coordination Interventions    Program Enrollment: Complex Care  Referral from Primary Care Provider: No  Suggested Interventions and Community Resources  Medi Set or Pill Pack: Declined  Physical Therapy: In Process (Comment: 04/12/21: Patient currently active with PT)  Registered Dietician:  In Process (Comment: 04/12/21: Referral placed for weight loss)  Smoking 07/12/21            Prior to Admission medications    Medication Sig Start Date End Date Taking? Authorizing Provider   ibuprofen (ADVIL;MOTRIN) 200 MG tablet Take 200 mg by mouth every 6 hours as needed for Pain Takes 2 tabs po q6h/prn for pain    Historical Provider, MD   fluticasone (FLONASE) 50 MCG/ACT nasal spray 1 spray by Each Nostril route daily 1/29/21   Nestor Strange PA-C   atorvastatin (LIPITOR) 10 MG tablet Take 1 tablet by mouth daily 6/26/20   Papito Coleman DO   aspirin 81 MG chewable tablet Take 81 mg by mouth daily    Historical Provider, MD   diclofenac sodium (VOLTAREN) 1 % GEL Apply 2 g topically 4 times daily 4/7/20   Isa Eye, APRN - CNP   Multiple Vitamins-Minerals (CENTRUM PO) Take by mouth     Historical Provider, MD   Aspirin-Acetaminophen-Caffeine (EXCEDRIN PO) Take by mouth as needed Q8 hrs.      Historical Provider, MD       Future Appointments   Date Time Provider Alise Guzman   4/28/2021 10:30 AM Kyle Sharma PT [place] [facility]   5/3/2021  9:30 AM LAINA Ji [facility]

## 2021-04-28 ENCOUNTER — HOSPITAL ENCOUNTER (OUTPATIENT)
Dept: PHYSICAL THERAPY | Age: 66
Setting detail: THERAPIES SERIES
Discharge: HOME | End: 2021-04-28
Payer: MEDICARE

## 2021-04-28 NOTE — PROGRESS NOTES
[address] [facility]                Phone: [phone]   Fax: [phone]    Physical Therapy Daily Treatment Note  Date:  2021    Patient Name:  Brooke Parks    :  1955  MRN: [id number]    Evaluating therapist:  DEVONTE Melo                        (3/17/21)  Restrictions/Precautions:    Diagnosis:  chronic L hip/LBP  Treatment Diagnosis:    Insurance/Certification information:  Noni Whitley     Referring Physician:  Manuela Rust Plan of care signed (Y/N):    Visit# / total visits:  5/5                            (4/15/21)  Pain level: 6/10   Time In:  1051  Time Out:  1142    Subjective:      Exercises:  Exercise/Equipment Resistance/Repetitions Other comments   StepOne   10 min            tball flex/rot 10x10s           rot st 3x20s    SKTC 3x20s    piriformis st 3x20s           seated hip abd [id number]                      flex [id number]         sit/stand 3x10           step ups 2x10x6\"             side  2x10x6\"                                  Other Therapeutic Activities:      Home Exercise Program:      Manual Treatments:      Modalities:  MH to LB/L hip PRN    Timed Code Treatment Minutes: Total Treatment Minutes:      Treatment/Activity Tolerance:  [] Patient tolerated treatment well [] Patient limited by fatique  [] Patient limited by pain  [] Patient limited by other medical complications  [] Other:     Prognosis: [] Good [] Fair  [] Poor    Patient Requires Follow-up: [] Yes  [] No    Plan:   [] Continue per plan of care [] Alter current plan (see comments)  [] Plan of care initiated [] Hold pending MD visit [] Discharge  Plan for Next Session:      See Weekly Progress Note: []  Yes  []  No  Next due:        Electronically signed by:   Jayla Campo

## 2021-05-03 ENCOUNTER — OFFICE VISIT (OUTPATIENT)
Dept: FAMILY MEDICINE CLINIC | Age: 66
End: 2021-05-03
Payer: MEDICARE

## 2021-05-03 VITALS
HEART RATE: 92 BPM | RESPIRATION RATE: 16 BRPM | SYSTOLIC BLOOD PRESSURE: 136 MMHG | OXYGEN SATURATION: 98 % | BODY MASS INDEX: 31.22 KG/M2 | WEIGHT: 210.8 LBS | TEMPERATURE: 96.8 F | DIASTOLIC BLOOD PRESSURE: 84 MMHG | HEIGHT: 69 IN

## 2021-05-03 DIAGNOSIS — G89.4 CHRONIC PAIN SYNDROME: Primary | ICD-10-CM

## 2021-05-03 DIAGNOSIS — M54.16 LUMBAR RADICULOPATHY: ICD-10-CM

## 2021-05-03 DIAGNOSIS — G89.29 CHRONIC PAIN OF RIGHT KNEE: ICD-10-CM

## 2021-05-03 DIAGNOSIS — M16.12 PRIMARY OSTEOARTHRITIS OF LEFT HIP: ICD-10-CM

## 2021-05-03 DIAGNOSIS — M25.561 CHRONIC PAIN OF RIGHT KNEE: ICD-10-CM

## 2021-05-03 PROCEDURE — 99214 OFFICE O/P EST MOD 30 MIN: CPT | Performed by: PHYSICIAN ASSISTANT

## 2021-05-03 RX ORDER — ETODOLAC 200 MG/1
CAPSULE ORAL
COMMUNITY
Start: 2021-04-14 | End: 2021-05-18

## 2021-05-03 RX ORDER — HYDROCODONE BITARTRATE AND ACETAMINOPHEN 5; 325 MG/1; MG/1
TABLET ORAL
COMMUNITY
Start: 2021-04-20 | End: 2021-05-10

## 2021-05-03 RX ORDER — KETOROLAC TROMETHAMINE 10 MG/1
10 TABLET, FILM COATED ORAL EVERY 6 HOURS PRN
Qty: 20 TABLET | Refills: 0 | Status: SHIPPED
Start: 2021-05-03 | End: 2021-05-18

## 2021-05-03 NOTE — PROGRESS NOTES
Pain:  Patient is here today with complaints of left hip pain. This is a/an chronic problem. This has been going on for awhile. Exacerbating factors include walking. Alleviating factors include Norco.  Pain is sharp and aching  in nature. 8/10. Pain is not radiating. This has happen to patient before. Imaging to date includes 01/19/21. Therapy to date includes physical therapy. Labs to date include 11/11/2020. He said he was told by Pain mgnt at [facility] to call when he needs more pain meds and then they refused. He says  \"I dont like liars. \" I read the note from Mark farah that says \"no intention for refills or chronic opiate therapy. \"  He asks \" you can't give me another 7 days. \" I do not find that appropriate if pain mgnt does not think that it is. He asks if he can then have a referral back to Ortho. Patient's past medical, surgical, social and/or family history reviewed, updated in chart, and are non-contributory (unless otherwise stated). Medications and allergies also reviewed and updated in chart.       Review of Systems:  Constitutional:  No fever, no fatigue, no chills, no headaches, no weight change  Dermatology:  No rash, no mole, no dry or sensitive skin  ENT:  No cough, no sore throat, no sinus pain, no runny nose, no ear pain  Cardiology:  No chest pain, no palpitations, no leg edema, no shortness of breath, no PND  Gastroenterology:  No dysphagia, no abdominal pain, no nausea, no vomiting, no constipation, no diarrhea, no heartburn  Musculoskeletal:+ joint pain, no leg cramps, no back pain, no muscle aches  Respiratory:  No shortness of breath, no orthopnea, no wheezing, no HAMMOND, no hemoptysis  Urology:  No blood in the urine, no urinary frequency, no urinary incontinence, no urinary urgency, no nocturia, no dysuria    Vitals:    05/03/21 0912   BP: 136/84   Pulse: 92   Resp: 16   Temp: 96.8 °F (36 °C)   SpO2: 98%   Weight: 210 lb 12.8 oz (95.6 kg)   Height: 5' 9\" (1.753 m) Physical Exam  Constitutional:       General: He is not in acute distress. Appearance: He is well-developed. HENT:      Head: Normocephalic and atraumatic. Right Ear: External ear normal.      Left Ear: External ear normal.      Nose: Nose normal.   Eyes:      General: No scleral icterus. Conjunctiva/sclera: Conjunctivae normal.      Pupils: Pupils are equal, round, and reactive to light. Neck:      Musculoskeletal: Normal range of motion and neck supple. Thyroid: No thyromegaly. Cardiovascular:      Rate and Rhythm: Normal rate and regular rhythm. Heart sounds: Normal heart sounds. No murmur. Pulmonary:      Effort: Pulmonary effort is normal. No accessory muscle usage or respiratory distress. Breath sounds: Normal breath sounds. No wheezing. Musculoskeletal: Normal range of motion. Skin:     General: Skin is warm and dry. Findings: No rash. Neurological:      Mental Status: He is alert and oriented to person, place, and time. Deep Tendon Reflexes: Reflexes are normal and symmetric. Psychiatric:         Speech: Speech normal.         Behavior: Behavior normal.         Assessment/Plan:      Luis Carlos was seen today for hip pain. Diagnoses and all orders for this visit:    Chronic pain syndrome  -     ketorolac (TORADOL) 10 MG tablet; Take 1 tablet by mouth every 6 hours as needed for Pain    Chronic pain of right knee  -     ketorolac (TORADOL) 10 MG tablet; Take 1 tablet by mouth every 6 hours as needed for Pain    Lumbar radiculopathy  -     ketorolac (TORADOL) 10 MG tablet; Take 1 tablet by mouth every 6 hours as needed for Pain    Primary osteoarthritis of left hip  -     Betty Hoff Bras, DO, Orthopaedics, [address]    reviewed [facility] pain mgnt and recent imaging  As above. Call or go to ED immediately if symptoms worsen or persist.  Return if symptoms worsen or fail to improve. , or sooner if necessary.       Educational materials and/or home exercises printed for patient's review and were included in patient instructions on his/her After Visit Summary and given to patient at the end of visit. Counseled regarding above diagnosis, including possible risks and complications,  especially if left uncontrolled. Counseled regarding the possible side effects, risks, benefits and alternatives to treatment; patient and/or guardian verbalizes understanding, agrees, feels comfortable with and wishes to proceed with above treatment plan. Advised patient to call with any new medication issues, and read all Rx info from pharmacy to assure aware of all possible risks and side effects of medication before taking. Reviewed age and gender appropriate health screening exams and vaccinations. Advised patient regarding importance of keeping up with recommended health maintenance and to schedule as soon as possible if overdue, as this is important in assessing for undiagnosed pathology, especially cancer, as well as protecting against potentially harmful/life threatening disease. Patient and/or guardian verbalizes understanding and agrees with above counseling, assessment and plan. All questions answered. Ciara Peters PA-C  5/3/2021    I have personally reviewed and updated the chief complaint, HPI, Past Medical, Family and Social History, as well as the above Review of Systems.

## 2021-05-05 ENCOUNTER — CARE COORDINATION (OUTPATIENT)
Dept: CARE COORDINATION | Age: 66
End: 2021-05-05

## 2021-05-05 NOTE — CARE COORDINATION
[place]. -F/U on status of smoking cessation. -F/U to determine if patient did call the RD back. Care Coordination Interventions    Program Enrollment: Complex Care  Referral from Primary Care Provider: No  Suggested Interventions and Community Resources  Medi Set or Pill Pack: Declined  Physical Therapy: Completed (Comment: 04/12/21: Patient currently active with PT; PT completed on 04/28/21)  Registered Dietician: In Process (Comment: 04/12/21: Referral placed for weight loss)  Smoking Cessation: In Process  Specialty Services Referral: Completed (Comment: 04/12/21: Patient active in Pain Management through [facility])  Transportation Support: Completed         Goals Addressed                 This Visit's Progress       Patient Stated     Patient Stated (pt-stated)   On track     I will follow with provider to assist with pain management    Barriers: overwhelmed by complexity of regimen  Plan for overcoming my barriers: Care Coordination, F/U on status of pain  Confidence: 7/10  Anticipated Goal Completion Date: 07/12/21         Other     Conditions and Symptoms   On track     I will schedule office visits, as directed by my provider. I will keep my appointment or reschedule if I have to cancel. I will notify my provider of any symptoms that indicate a worsening of my condition.   R/T Pain    Barriers: overwhelmed by complexity of regimen and time constraints  Plan for overcoming my barriers: Care Coordination, Remind patient of upcoming appointments  Confidence: 8/10  Anticipated Goal Completion Date: 07/12/21         Nutrition Plan   No change     I will allow ACM to place a referral to RD for education on weight loss strategies    Barriers: overwhelmed by complexity of regimen and lack of education  Plan for overcoming my barriers: Care Coordination, Refer to RD  Confidence: 7/10  Anticipated Goal Completion Date: 07/12/21       Wellness Goal   No change     Patient Self-Management Goal for Health Maintenance  Goal: I will chose a goal related to tobacco cessation:  I will think about my triggers for smoking, I will think about reasons why I should quit smoking, I will eliminate tobacco products from my home and work environments, and I will avoid triggers and negative influences. Barriers: overwhelmed by complexity of regimen and stress  Plan for overcoming my barriers: Care Coordination, Educate on the benefits of Smoking Cessation  Confidence: 6/10  Anticipated Goal Completion Date: 07/12/21            Prior to Admission medications    Medication Sig Start Date End Date Taking? Authorizing Provider   etodolac (LODINE) 200 MG capsule take 1 capsule by mouth every 8 hours for 7 days 4/14/21   Historical Provider, MD   HYDROcodone-acetaminophen ([facility]) 5-325 MG per tablet take 1 tablet by mouth every 8 hours if needed 4/20/21   Historical Provider, MD   ketorolac (TORADOL) 10 MG tablet Take 1 tablet by mouth every 6 hours as needed for Pain 5/3/21 5/3/22  Len Galicia PA-C   ibuprofen (ADVIL;MOTRIN) 200 MG tablet Take 200 mg by mouth every 6 hours as needed for Pain Takes 2 tabs po q6h/prn for pain    Historical Provider, MD   fluticasone (FLONASE) 50 MCG/ACT nasal spray 1 spray by Each Nostril route daily 1/29/21   Len Galicia PA-C   atorvastatin (LIPITOR) 10 MG tablet Take 1 tablet by mouth daily 6/26/20   Crystal Jauregui DO   aspirin 81 MG chewable tablet Take 81 mg by mouth daily    Historical Provider, MD   diclofenac sodium (VOLTAREN) 1 % GEL Apply 2 g topically 4 times daily 4/7/20   Kristen Elizabeth APRN - CNP   Multiple Vitamins-Minerals (CENTRUM PO) Take by mouth     Historical Provider, MD   Aspirin-Acetaminophen-Caffeine (EXCEDRIN PO) Take by mouth as needed Q8 hrs.      Historical Provider, MD       Future Appointments   Date Time Provider Alise Guzman   5/18/2021  9:00 AM Brandie Arreola DO [facility]      and   General Assessment    Do you have any symptoms that are

## 2021-05-10 ENCOUNTER — HOSPITAL ENCOUNTER (EMERGENCY)
Age: 66
Discharge: HOME | End: 2021-05-10
Payer: MEDICARE

## 2021-05-10 VITALS
BODY MASS INDEX: 31.01 KG/M2 | TEMPERATURE: 96.9 F | HEART RATE: 90 BPM | DIASTOLIC BLOOD PRESSURE: 80 MMHG | OXYGEN SATURATION: 97 % | WEIGHT: 210 LBS | SYSTOLIC BLOOD PRESSURE: 129 MMHG | RESPIRATION RATE: 18 BRPM

## 2021-05-10 DIAGNOSIS — M25.552 LEFT HIP PAIN: Primary | ICD-10-CM

## 2021-05-10 PROCEDURE — 99282 EMERGENCY DEPT VISIT SF MDM: CPT

## 2021-05-10 RX ORDER — HYDROCODONE BITARTRATE AND ACETAMINOPHEN 5; 325 MG/1; MG/1
1 TABLET ORAL EVERY 6 HOURS PRN
Qty: 12 TABLET | Refills: 0 | Status: SHIPPED | OUTPATIENT
Start: 2021-05-10 | End: 2021-05-13

## 2021-05-10 RX ORDER — METHYLPREDNISOLONE 4 MG/1
TABLET ORAL
Qty: 1 KIT | Refills: 0 | Status: SHIPPED | OUTPATIENT
Start: 2021-05-10 | End: 2021-05-16

## 2021-05-10 RX ORDER — CYCLOBENZAPRINE HCL 5 MG
5 TABLET ORAL 2 TIMES DAILY PRN
Qty: 10 TABLET | Refills: 0 | Status: SHIPPED | OUTPATIENT
Start: 2021-05-10 | End: 2021-05-20

## 2021-05-10 NOTE — ED PROVIDER NOTES
[address]  Department of Emergency Medicine   ED  Encounter Note  Admit Date/RoomTime: 5/10/2021  8:46 AM  ED Room: [facility]/ST-1    NAME: Asuncion Alvarez  : 1955  MRN: [id number]     Chief Complaint:  Hip Pain (left hip pain, chronic (in process of getting injections started in left hip through ortho [facility]), reports ortho wrote norco script for 5 days but didnt last till appointment on )    History of Present Illness       Asuncion Alvarez is a 72 y.o. old male who presents to the emergency department by private vehicle, for  non-traumatic left hip pain which occured several month(s) prior to arrival.  The pain was result of no known injury. Since onset the symptoms have been waxing and waning. Patient has  prior history of pain/injury with regards to today's visit. His pain is aggraveated by any movement or pressure on or palpation of, relieved by prescription medications for the condition and associated with no other . He denies any head injury, headache, loss of consciousness, confusion, dizziness or neck pain. Patient states that he is seeing [facility]  Tetanus Status: up to date. ROS   Pertinent positives and negatives are stated within HPI, all other systems reviewed and are negative. Past Medical History:  has a past medical history of Anxiety, Chronic right-sided low back pain with right-sided sciatica, Elbow pain, and Right hand pain. Surgical History:  has a past surgical history that includes shoulder surgery; arthrodesis; Finger surgery; and epidural steroid injection (Right, 2019). Social History:  reports that he has been smoking cigarettes. He has a 17.50 pack-year smoking history. He has never used smokeless tobacco. He reports previous drug use. Drug: Cocaine. He reports that he does not drink alcohol. Family History: family history includes Heart Disease in his father; Stroke in his mother.      Allergies: Penicillins    Physical Exam   Oxygen Saturation Interpretation: Normal.        ED Triage Vitals   BP Temp Temp src Pulse Resp SpO2 Height Weight   05/10/21 0844 05/10/21 0831 -- 05/10/21 0831 05/10/21 0831 05/10/21 0831 -- 05/10/21 0844   129/80 96.9 °F (36.1 °C)  82 16 96 %  210 lb (95.3 kg)         Constitutional:  Alert. Development consistent with age. Head:  Atraumatic, without temporal or scalp tenderness. Eyes:  PERRL, EOMI. No periorbital ecchymoses. Conjunctiva: normal.  Ears:  External ears without lesions. Throat:  Pharynx without lesions. Airway patient. Neck:  Supple. Nontender. Chest:  Symmetrical without visible rash or tenderness. Respiratory:  Clear to auscultation and breath sounds equal.  CV:  Regular rate and rhythm, normal heart sounds, without pathological murmurs. GI:  Abdmen Soft, nontender. No firm or pulsatile mass. No abrasions, ecchymoses, or lacerations. Back:  No costovertebral, paravertebral, intervertebral, or vertebral tenderness or spasm. Musculoskeletal:  Pelvis:  Left. Tenderness: mild. Stability:  stable to palpation. Hip(s):  Left: hip. Tenderness:  mild. Swelling: None. Deformity: no.       ROM: full range of motion. Skin: normal exam; no wounds, erythema, or swelling. Neurovascular: Motor deficit: none. Sensory deficit: none. Pulse deficit: none. Capillary refill: normal.       Calf Tenderness:  No Bilateral.          Edema:  none Both lower extremity(s). Gait:  normal.  Integument:  No abrasions, ecchymoses, or lacerations unless noted elsewhere. Neurological:  Orientation age-appropriate. Motor functions intact. Lab / Imaging Results   (All laboratory and radiology results have been personally reviewed by myself)  Labs:  No results found for this visit on 05/10/21. Imaging: All Radiology results interpreted by Radiologist unless otherwise noted.   No orders to display     ED Course / Medical Decision Making   Medications - No data to display         Consult(s):   None    Procedure(s):   none    MDM:   Imaging was not obtained based on no suspicion for fracture/dislocation as per history/physical findings. At this time the patient is without objective evidence of an acute process requiring hospitalization or inpatient management. They have remained hemodynamically stable throughout their entire ED visit and are stable for discharge with outpatient follow-up. The plan has been discussed in detail and they are aware of the specific conditions for emergent return, as well as the importance of follow-up. Plan of Care/Counseling:  I reviewed today's visit with the patient in addition to providing specific details for the plan of care and counseling regarding the diagnosis and prognosis. Questions are answered at this time and are agreeable with the plan. Assessment      1. Left hip pain      Plan   Discharge home. Patient condition is good    New Medications     Discharge Medication List as of 5/10/2021  9:37 AM      START taking these medications    Details   cyclobenzaprine (FLEXERIL) 5 MG tablet Take 1 tablet by mouth 2 times daily as needed for Muscle spasms, Disp-10 tablet, R-0Normal      methylPREDNISolone (MEDROL, JOIE,) 4 MG tablet Take by mouth., Disp-1 kit, R-0Normal           Electronically signed by RADHA Severino CNP   DD: 5/10/21  **This report was transcribed using voice recognition software. Every effort was made to ensure accuracy; however, inadvertent computerized transcription errors may be present.   END OF ED PROVIDER NOTE        RADHA Helm CNP  05/10/21 One RADHA Streeter CNP  05/10/21 2168

## 2021-05-14 DIAGNOSIS — M16.12 PRIMARY OSTEOARTHRITIS OF LEFT HIP: Primary | ICD-10-CM

## 2021-05-14 NOTE — PROGRESS NOTES
[address] [facility]                Phone: [phone]   Fax: [phone]    To: Dr. Mariela Bolivar       From: [place]     Patient: Leeanne Martin       : 1955  Diagnosis:       Date: 2021  Treatment Diagnosis:  chronic L hip/LBP    Physical Therapy Discharge Note    Total Visits to date:   5 Cancels/No-shows to date:  2 cancellations/2 no-shows    Plan of Care/Treatment to date:  [x] Therapeutic Exercise    [] Modalities:  [x] Therapeutic Activity     [] Ultrasound  [] Electrical Stimulation  [] Gait Training      [] Cervical Traction   [] Lumbar Traction  [x] Neuromuscular Re-education  [] Cold/hotpack [] Iontophoresis  [x] Instruction in HEP      Other:  [] Manual Therapy       []    [] Aquatic Therapy       []                            Progress towards goals:  pt reached all stated functional LTG's for therapy while aching persisted across L hip/LB; I with HEP for home management of condition       Goal Status:  [] Achieved [x] Partially Achieved  [] Not Achieved     Patient Status: [] Continue per initial plan of Care     [x] Patient now discharged     [] Additional visits requested, Please re-certify for additional visits:          Electronically signed by: DEVONTE Kinney    If you have any questions or concerns, please don't hesitate to call.   Thank you for your referral.

## 2021-05-18 ENCOUNTER — OFFICE VISIT (OUTPATIENT)
Dept: ORTHOPEDIC SURGERY | Age: 66
End: 2021-05-18
Payer: MEDICARE

## 2021-05-18 ENCOUNTER — HOSPITAL ENCOUNTER (OUTPATIENT)
Dept: GENERAL RADIOLOGY | Age: 66
Discharge: HOME | End: 2021-05-20
Payer: MEDICARE

## 2021-05-18 VITALS — TEMPERATURE: 98.7 F

## 2021-05-18 DIAGNOSIS — M54.16 LUMBAR RADICULOPATHY: ICD-10-CM

## 2021-05-18 DIAGNOSIS — G89.29 CHRONIC RIGHT SHOULDER PAIN: ICD-10-CM

## 2021-05-18 DIAGNOSIS — M16.12 PRIMARY OSTEOARTHRITIS OF LEFT HIP: ICD-10-CM

## 2021-05-18 DIAGNOSIS — M53.3 PAIN OF LEFT SACROILIAC JOINT: Primary | ICD-10-CM

## 2021-05-18 DIAGNOSIS — G89.4 CHRONIC PAIN SYNDROME: ICD-10-CM

## 2021-05-18 DIAGNOSIS — M25.511 CHRONIC RIGHT SHOULDER PAIN: ICD-10-CM

## 2021-05-18 PROCEDURE — 99215 OFFICE O/P EST HI 40 MIN: CPT | Performed by: PHYSICIAN ASSISTANT

## 2021-05-18 PROCEDURE — 2500000003 HC RX 250 WO HCPCS

## 2021-05-18 PROCEDURE — 73502 X-RAY EXAM HIP UNI 2-3 VIEWS: CPT

## 2021-05-18 PROCEDURE — 6360000002 HC RX W HCPCS

## 2021-05-18 PROCEDURE — 99202 OFFICE O/P NEW SF 15 MIN: CPT | Performed by: PHYSICIAN ASSISTANT

## 2021-05-18 PROCEDURE — 20610 DRAIN/INJ JOINT/BURSA W/O US: CPT | Performed by: PHYSICIAN ASSISTANT

## 2021-05-18 PROCEDURE — 20552 NJX 1/MLT TRIGGER POINT 1/2: CPT | Performed by: PHYSICIAN ASSISTANT

## 2021-05-18 RX ORDER — TRIAMCINOLONE ACETONIDE 40 MG/ML
40 INJECTION, SUSPENSION INTRA-ARTICULAR; INTRAMUSCULAR ONCE
Status: COMPLETED | OUTPATIENT
Start: 2021-05-18 | End: 2021-05-18

## 2021-05-18 RX ORDER — BUPIVACAINE HYDROCHLORIDE 2.5 MG/ML
3 INJECTION, SOLUTION EPIDURAL; INFILTRATION; INTRACAUDAL ONCE
Status: COMPLETED | OUTPATIENT
Start: 2021-05-18 | End: 2021-05-18

## 2021-05-18 RX ORDER — LIDOCAINE HYDROCHLORIDE 10 MG/ML
3 INJECTION, SOLUTION INFILTRATION; PERINEURAL ONCE
Status: COMPLETED | OUTPATIENT
Start: 2021-05-18 | End: 2021-05-18

## 2021-05-18 RX ADMIN — TRIAMCINOLONE ACETONIDE 40 MG: 40 INJECTION, SUSPENSION INTRA-ARTICULAR; INTRAMUSCULAR at 13:13

## 2021-05-18 RX ADMIN — BUPIVACAINE HYDROCHLORIDE 7.5 MG: 2.5 INJECTION, SOLUTION EPIDURAL; INFILTRATION; INTRACAUDAL at 13:12

## 2021-05-18 RX ADMIN — LIDOCAINE HYDROCHLORIDE 3 ML: 10 INJECTION, SOLUTION INFILTRATION; PERINEURAL at 13:13

## 2021-05-18 NOTE — PATIENT INSTRUCTIONS
Continue to maintain healthy lifestyle and weight  Activity as tolerated. You may be sore at the injection site for several days. OK to use over the counter Acetaminophen or Ibuprofen as needed for pain  You may apply ice to your injection site. Call the office if any unusual new swelling, pain or redness develops around your left sacroiliac joint. Steroid injections can be repeated every 3 months if needed  Goal for steroid injection is at least 8 weeks of relief    We are going to refer you to pain management at [facility] for possible lumbar work-up and epidural injections. You will be contacted in order to have this set up. In addition, we are going to refer you to Dr. 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 Follow-up as needed in our office. Call with any questions or concerns.

## 2021-05-18 NOTE — PROGRESS NOTES
New Patient Orthopaedic Progress Note    Jo Ann Mullins is a 72 y.o. male, his YOB: 1955 with the following history as recorded in [facility]:      Patient Active Problem List    Diagnosis Date Noted    Pain of left sacroiliac joint 05/18/2021    Primary osteoarthritis of left hip 05/18/2021    COVID-19 11/12/2020    Lumbar radiculopathy 04/02/2019    Chronic pain syndrome 03/21/2019    Chronic right-sided low back pain with right-sided sciatica 03/21/2019    Right hand pain 03/21/2019    Chronic right shoulder pain 03/21/2019    Chronic pain of right knee 03/21/2019    Myalgia 03/21/2019    Positive FIT (fecal immunochemical test) 03/14/2019    Degenerative arthritis of finger 11/03/2010     Current Outpatient Medications   Medication Sig Dispense Refill    cyclobenzaprine (FLEXERIL) 5 MG tablet Take 1 tablet by mouth 2 times daily as needed for Muscle spasms 10 tablet 0    ibuprofen (ADVIL;MOTRIN) 200 MG tablet Take 200 mg by mouth every 6 hours as needed for Pain Takes 2 tabs po q6h/prn for pain      fluticasone (FLONASE) 50 MCG/ACT nasal spray 1 spray by Each Nostril route daily 1 Bottle 5    atorvastatin (LIPITOR) 10 MG tablet Take 1 tablet by mouth daily 30 tablet 3    aspirin 81 MG chewable tablet Take 81 mg by mouth daily      diclofenac sodium (VOLTAREN) 1 % GEL Apply 2 g topically 4 times daily 2 Tube 0    Multiple Vitamins-Minerals (CENTRUM PO) Take by mouth       Aspirin-Acetaminophen-Caffeine (EXCEDRIN PO) Take by mouth as needed Q8 hrs. No current facility-administered medications for this visit.      Allergies: Penicillins  Past Medical History:   Diagnosis Date    Anxiety     Chronic right-sided low back pain with right-sided sciatica 3/21/2019    Elbow pain     Right hand pain      Past Surgical History:   Procedure Laterality Date    ARTHRODESIS      Right 3rd PIP joint    EPIDURAL STEROID INJECTION Right 4/11/2019    L5 S1 EPIDURAL STEROID INJECTION performed by Lv Mcintyre DO at [id number] [facility]      fusion    SHOULDER SURGERY      Right AC joint surgery     Family History   Problem Relation Age of Onset    Heart Disease Father         64    Stroke Mother      Social History     Tobacco Use    Smoking status: Current Some Day Smoker     Packs/day: 0.50     Years: 35.00     Pack years: 17.50     Types: Cigarettes    Smokeless tobacco: Never Used    Tobacco comment: Patient states he wants to stop smoking, however, he is not sure if he is ready to stop. Substance Use Topics    Alcohol use: No                             Chief Complaint   Patient presents with    Pain      Left hip  and Right shoulder pain. MRI results to be reviewed for hip       SUBJECTIVE: Tritsin Salazar is a 55-year-old male who presents today complaining of left-sided low back pain with intermittent radiation into the left hip area. He states that he has had chronic pain issues for many years with a left-sided low back pain has been getting significantly worse over the past 6 to 8 months. He does not recall any specific injury leading to his symptoms. He denies right-sided low back symptoms or radiating leg pain or numbness. He states that he has had conservative measures including physical therapy which did help. According to the patient, he is in the process of trying to get what he describes as some type of back injection possibly an SI joint injection approved through his insurance. He states that the injection was initially denied and authorization was resubmitted. He states that he did have a right SI joint injection in the past which helped. He states that the majority of his pain is in the left low back area and he denies left buttock or left groin pain. He states that he is having more difficulty with daily activity due to the pain.   He states that he does see pain management in [facility] [employer] and he is interested in getting set up for pain management in [place]. In addition, he does have right shoulder pain and states that he has had 2 prior surgeries with Dr. Boone Srivastava on his shoulder which helped him. He states that his left-sided low back/hip pain is considerably worse than his shoulder issues. Review of Systems   Constitutional: Negative for fever, chills, diaphoresis, appetite change and fatigue. HENT: Negative for dental issues, hearing loss and tinnitus. Negative for congestion, sinus pressure, sneezing, sore throat. Negative for headache. Eyes: Negative for visual disturbance, blurred and double vision. Negative for pain, discharge, redness and itching  Respiratory: Negative for cough, shortness of breath and wheezing. Cardiovascular: Negative for chest pain, palpitations and leg swelling. No dyspnea on exertion   Gastrointestinal:   Negative for nausea, vomiting, abdominal pain, diarrhea, constipation  or black or bloody. Hematologic\Lymphatic:  negative for bleeding, petechiae,   Genitourinary: Negative for hematuria and difficulty urinating. Musculoskeletal: Negative for neck pain and stiffness. Negative for back pain, see HPI  Skin: Negative for pallor, rash and wound. Neurological: Negative for dizziness, tremors, seizures, weakness, light-headedness, no TIA or stroke symptoms. No numbness and headaches. Psychiatric/Behavioral: Negative. OBJECTIVE:      Physical Examination:   General appearance: alert, well appearing, and in no distress,  normal appearing weight.  No visible signs of trauma   Mental status: alert, oriented to person, place, and time, normal mood, behavior, speech, dress, motor activity, and thought processes  Abdomen: soft, nondistended  Resp:   resp easy and unlabored, no audible wheezes note, normal symmetrical expansion of both hemithoraces  Cardiac: distal pulses palpable, skin and extremities well perfused  Neurological: alert, oriented X3, normal speech, no focal findings or movement disorder noted, motor and sensory grossly normal bilaterally, normal muscle tone, no tremors, strength 5/5, normal gait and station  HEENT: normochephalic atraumatic, external ears and eyes normal, sclera normal, neck supple, no nasal discharge. Extremities:   peripheral pulses normal, no edema, redness or tenderness in the calves   Skin: normal coloration, no rashes or open wounds, no suspicious skin lesions noted  Psych: Affect euthymic   Musculoskeletal:   Extremity:  Left Lower Extremity  Skin clean dry and intact, without signs of infection  Moderate tenderness to palpation over the left SI joint area. no edema noted  Compartments supple throughout thigh and leg  Calf supple and nontender  Demonstrates active knee flexion/extension, ankle plantar/dorsiflexion/great toe extension. States sensation intact to touch in sural/deep peroneal/superficial peroneal/saphenous/posterior tibial nerve distributions to foot/ankle. Palpable dorsalis pedis and posterior tibialis pulses, cap refill brisk in toes, foot warm/perfused. No pain with passive internal and external rotation of his left hip. No tenderness over the left trochanteric bursal area  Good strength in the left hip flexor. He does complain of left-sided low back pain nicely straight leg raise to approximately 60 degrees without distal pain. Right Upper Extremity  Skin is clean dry and intact  no edema noted  Radial pulse palpable, fingers warm with BCR  Flex/extension intact to wrist, thumb and fingers  Finger opposition intact  Finger adduction/abduction intact  Finger crossover intact  Subjectively states sensation intact to radial/medial/ulnar distribution  No tenderness over the right AC joint. No significant pain with range of motion of the right shoulder. Temp 98.7 °F (37.1 °C) (Oral)      XR: 5/18/21   X-rays taken in the office today left hip and pelvis show moderate bilateral hip osteoarthritic changes.   In addition, there are moderate degenerative work-up. He states that he was seeing pain management in [facility] [employer] but would like to get set up here [place]. We are going to set him up for pain management at [facility] for further evaluation and possible injections/epidurals. He is in agreement with this plan. In addition, patient was given a left SI joint injection in the office today under sterile conditions with a combination 3 cc Marcaine, 3 cc lidocaine and 1 cc Kenalog without complications. He states that he is in the process of trying to get some type of injection approved through his insurance possibly an SI injection however after long discussion he decided to proceed with the left SI injection in the office today. He tolerated the procedure well with no complications. Of note, he did ask for hydrocodone multiple times however I told him that I cannot give him any narcotic pain medication today. He will be seen on an as-needed basis in our office. He was advised to contact the office if he has any problems or questions. Electronically signed by PAVAN Chung on 5/18/2021 at 11:52 AM  Note: This report was completed using [employer] voiced recognition software. Every effort has been made to ensure accuracy; however, inadvertent computerized transcription errors may be present.

## 2021-05-20 ENCOUNTER — CARE COORDINATION (OUTPATIENT)
Dept: CARE COORDINATION | Age: 66
End: 2021-05-20

## 2021-05-20 NOTE — CARE COORDINATION
Aaron Sloano  May 20, 2021    Initial Referral Reason: Weight Loss Tips     Patient Care Team:  Rob Corado PA-C as PCP - General (Physician Assistant)  Rob Corado PA-C as PCP - [facility] Provider  Femi Medina, RN as Ambulatory Care Manager  Phoebe Morales RD, LD as Dietitian (Dietitian)    Past Medical History:    Current Outpatient Medications   Medication Sig Dispense Refill    cyclobenzaprine (FLEXERIL) 5 MG tablet Take 1 tablet by mouth 2 times daily as needed for Muscle spasms 10 tablet 0    ibuprofen (ADVIL;MOTRIN) 200 MG tablet Take 200 mg by mouth every 6 hours as needed for Pain Takes 2 tabs po q6h/prn for pain      fluticasone (FLONASE) 50 MCG/ACT nasal spray 1 spray by Each Nostril route daily 1 Bottle 5    atorvastatin (LIPITOR) 10 MG tablet Take 1 tablet by mouth daily 30 tablet 3    aspirin 81 MG chewable tablet Take 81 mg by mouth daily      diclofenac sodium (VOLTAREN) 1 % GEL Apply 2 g topically 4 times daily 2 Tube 0    Multiple Vitamins-Minerals (CENTRUM PO) Take by mouth       Aspirin-Acetaminophen-Caffeine (EXCEDRIN PO) Take by mouth as needed Q8 hrs. No current facility-administered medications for this visit.        Biochemical Data, Medical Tests and Procedures:    No results found for: LABA1C  No results found for: EAG    Lab Results   Component Value Date    CHOL 304 (H) 06/23/2020     Lab Results   Component Value Date    TRIG 273 (H) 06/23/2020     Lab Results   Component Value Date    HDL 45 06/23/2020     Lab Results   Component Value Date    LDLCALC 204 (H) 06/23/2020     Lab Results   Component Value Date    LABVLDL 55 06/23/2020     No results found for: [facility]    Lab Results   Component Value Date    WBC 14.5 (H) 11/11/2020    HGB 11.0 (L) 11/11/2020    HCT 35.0 (L) 11/11/2020    MCV 93.6 11/11/2020     (H) 11/11/2020       Lab Results   Component Value Date    CREATININE 0.8 11/11/2020    BUN 15 11/11/2020     (L) 11/11/2020    K 4.5 11/11/2020    CL 93 (L) 11/11/2020    CO2 27 11/11/2020         Anthropometric Measurements:    Height: 69 inches (175.3 cm)  Weight: 210 lb (95.3 kg)  BMI: 31.01 (obesity class I)  IBW: 160 lb (72.7 kg) +-10%  %IBW: 131.25%   AdBW: 180 lb (81.8 kg)    Physical Exam Findings:  Deferred    Nutrition Interview: RD called pt, explained reason for call and role in care. Pt states appetite is \"fantastic\", typically eats 2 meals/day. See food recall below. Patient states his weight goal is 208 lbs. RD explained the importance of setting healthy, realistic goals. Discussed you are more likely to succeed when you set realistic goals for yourself- make small changes step by step and you will see a big impact with time. Explained the importance of eating at least 3 meals/day and making these meals balanced with a variety of food. Discussed the components of a balanced meal using the MyPlate [id number]-3/8 plate fruits and/or vegetables, 1/4 plate protein and 1/4 plate starchy carbohydrates with 8 oz glass of low fat milk if desired. RD used patient's dinner example and discussed adding a non starchy vegetable such as carrots or green beans. Patient asked specifically if he could add corn to the meal for his vegetable- RD explained starchy vegetables versus non starchy vegetables. Pt verbalizes understanding. RD provided another example of a balanced meal: grilled chicken with broccoli and a serving of brown rice. Discussed watching portion sizes to help manage calorie intake. RD encouraged patient to focus on eating 3 balanced meals a day instead of 2 meals/day. Explained the importance of meeting estimated nutrition needs. Explained if we are not eating enough throughout the day, our body will go into starvation mode and hold onto fat cells. RD reiterated importance of choosing fruits, vegetables, whole grains, lean protein sources and low fat dairy products.  Patient states on some days he snacks a lot- per pt he will snack on donuts, ice cream or cashews. RD discussed eating donuts and ice cream in moderation. Discussed eating healthy snacks with a protein and a carbohydrate such as cashews with a piece of fruit, hard boiled egg with piece of fruit, etc. Pt verbalizes understanding. RD discussed the importance of incorporating physical activity and staying hydrated. Pt states he takes walks and drinks plenty of water. RD acknowledged patient's readiness to change and encouraged pt to focus on making small changes one at a time. RD offered to mail educational handouts to pt to reinforce concepts discussed during phone conversation, pt accepted and very appreciative. RD verified address. 24-Hour Diet Recall  Breakfast  Consumed: none    Lunch  Consumed: grits, pancake and hot dog    PM Snack  Consumed: donuts, ice cream, cashews    Dinner  Consumed: roast with potatoes     Beverages: water, soda    Exercise: walks    Nutrition Diagnosis:  #1 Problem Overweight/Obesity       Etiology related to excessive energy intake        Signs/Symptoms as evidenced by BMI 31.01 (obesity class I)    #2 Problem Food and nutrition-related knowledge deficit       Etiology related to lack of prior nutrition related education regarding healthy eating       Signs/Symptoms as evidenced by food recall (2 meals/day) and conversation with patient     Nutrition Intervention:     Estimated Needs  regular diet providing 1900 kcals to promote wt loss (Wh[place]ide Cunning based on AdBW for obesity class I). Estimated daily CHO Needs: 261 g (based on 45-65% total calorie intake)  Estimated daily Protein Needs: 65-82 g (based on 0.8-1.0 g/kg based on AdBW for obesity class I)  Estimated daily Fluid Needs: 64 oz. #1 Nutrition Information: Provided patient with Eating Right for a Healthy Weight and General Healthy Nutrition Therapy handouts. For reinforcement of concepts discussed during nutrition interview.      #2 Nutrition Counseling: Used open-ended questions to assess patients perceived susceptibility and severity of disease state. Discussed potential impact of health threat on patient's lifestyle. Used open-ended questions to assess patient's perception regarding benefits of and barriers to implementation of nutrition therapy. #3 Nutrition Education: Clearly defined the benefits of nutrition therapy. Summarized and affirmed positive aspects of current nutrition patterns. Provided education regarding value of adherence to regular diet. Discussed ways to establish applying concepts of alternatives and choices regarding implementation of diet. Explored ideas for small, incremental goals to initiate behavior change. Monitoring and Evaluation:    Indicator/Goal Criteria   #1 Eat balanced meals consistently throughout the day. #1 Focus on eating 3 meals/day instead of only 2 meals/day. #2 Incorporate more of a variety at meals. Choose fruits, vegetables, whole grains, lean protein and low fat dairy. #2 Make meals balanced using the MyPlate [id number]-5/4 plate fruits and/or vegetables, 1/4 plate protein and 1/4 plate starchy carbohydrates with 8 oz glass of low fat milk if desired. #3 Eat balanced snacks in between meals if hungry. Eat sweets in moderation. #3 Include a serving of protein with a serving of carbohydrates. Limit consumption of sweets. Follow Up: RD will call pt in 2 weeks to follow up and make sure pt received handouts in mail. RD will answer any nutrition related questions at this time.      [address], [address]

## 2021-05-20 NOTE — CARE COORDINATION
Ambulatory Care Coordination Note  5/20/2021  CM Risk Score: 8  Charlson 10 Year Mortality Risk Score: 10%     ACC: Ila Nair, SKYLER    Summary Note:   [facility] contacted patient to follow up on his status. Patient has not yet returned the RD call. [facility] provided him with her contact information and educated him that she may be able to give him helpful suggestions on weight loss strategies. Patient states he will call her back. Patient does not feel he needs to see his PCP until after he has appointments with Dr. Davon Kellogg and Pain Management. PAIN  -Patient did attend appointment with Dr. Lisa Wells office on 5/18/21. He did receive an injection and states his hip pain is currently a 5/10 and describes the pain as a slight stabbing sensation. Patient also stated his mobility is increased. -Patient was referred to Dr. Davon Kellogg and Pain Management with [facility]. He is waiting for calls from both providers to schedule appointments. SMOKING  -Patient states pain is his trigger and d/t he feels his pain is better controlled at this time, he feels he can decrease his smoking. He is currently smoking 12 cigarettes per day. ACM asked patient is he could cut down to 9, patient thought he may be able to cut down to 3 per day. -Patient is aware of the benefits of smoking cessation. PLAN  -Continue Care Coordination  -F/U to determine if patient was contacted by Dr. Davon Kelolgg and Pain Management to schedule appointments. -F/U on status of smoking cessation. -F/U to determine if patient did contact the RD. Care Coordination Interventions    Program Enrollment: Complex Care  Referral from Primary Care Provider: No  Suggested Interventions and Community Resources  Medi Set or Pill Pack: Declined  Physical Therapy: Completed (Comment: 04/12/21: Patient currently active with PT; PT completed on 04/28/21)  Registered Dietician: In Process (Comment: 04/12/21: Referral placed for weight loss)  Smoking Cessation:  In Process  Specialty Services Referral: Completed (Comment: 04/12/21: Patient active in Pain Management through [facility]; 05/18/21: Referral placed for Pain Management through [facility])  Transportation Support: Completed         Goals Addressed                    This Visit's Progress       Patient Stated      Patient Stated (pt-stated)   On track      I will follow with provider to assist with pain management    Barriers: overwhelmed by complexity of regimen  Plan for overcoming my barriers: Care Coordination, F/U on status of pain  Confidence: 7/10  Anticipated Goal Completion Date: 07/12/21         Other      Conditions and Symptoms   On track      I will schedule office visits, as directed by my provider. I will keep my appointment or reschedule if I have to cancel. I will notify my provider of any symptoms that indicate a worsening of my condition. R/T Pain    Barriers: overwhelmed by complexity of regimen and time constraints  Plan for overcoming my barriers: Care Coordination, Remind patient of upcoming appointments  Confidence: 8/10  Anticipated Goal Completion Date: 07/12/21          Nutrition Plan   No change      I will allow ACM to place a referral to RD for education on weight loss strategies    Barriers: overwhelmed by complexity of regimen and lack of education  Plan for overcoming my barriers: Care Coordination, Refer to RD  Confidence: 7/10  Anticipated Goal Completion Date: 07/12/21        Wellness Goal   No change      Patient Self-Management Goal for Health Maintenance  Goal: I will chose a goal related to tobacco cessation:  I will think about my triggers for smoking, I will think about reasons why I should quit smoking, I will eliminate tobacco products from my home and work environments, and I will avoid triggers and negative influences.   Barriers: overwhelmed by complexity of regimen and stress  Plan for overcoming my barriers: Care Coordination, Educate on the benefits of Smoking

## 2021-05-21 ENCOUNTER — CARE COORDINATION (OUTPATIENT)
Dept: CARE COORDINATION | Age: 66
End: 2021-05-21

## 2021-05-21 ENCOUNTER — TELEPHONE (OUTPATIENT)
Dept: ADMINISTRATIVE | Age: 66
End: 2021-05-21

## 2021-05-21 DIAGNOSIS — M16.12 PRIMARY OSTEOARTHRITIS OF LEFT HIP: ICD-10-CM

## 2021-05-21 DIAGNOSIS — M25.561 CHRONIC PAIN OF RIGHT KNEE: ICD-10-CM

## 2021-05-21 DIAGNOSIS — G89.29 CHRONIC RIGHT SHOULDER PAIN: Primary | ICD-10-CM

## 2021-05-21 DIAGNOSIS — M54.16 LUMBAR RADICULOPATHY: ICD-10-CM

## 2021-05-21 DIAGNOSIS — G89.29 CHRONIC PAIN OF RIGHT KNEE: ICD-10-CM

## 2021-05-21 DIAGNOSIS — G89.4 CHRONIC PAIN SYNDROME: ICD-10-CM

## 2021-05-21 DIAGNOSIS — M25.511 CHRONIC RIGHT SHOULDER PAIN: Primary | ICD-10-CM

## 2021-05-21 DIAGNOSIS — M19.041 OSTEOARTHRITIS OF FINGER OF RIGHT HAND: ICD-10-CM

## 2021-05-21 NOTE — CARE COORDINATION
Patient contacted this AC stating that [facility] contacted him and stated they denied the referral placed by Dr. Marzena Richards office. Patient stated he has been a patient with this practice in the past, and did not have a good experience. Patient states he would like a referral to [facility]. [facility] educated patient to contact Dr. Marzena Richards office, and ask for the referral to [address]. Patient agreed to do so. PLAN  -Continue to follow to determine if patient did receive a referral to [facility].

## 2021-05-24 ENCOUNTER — HOSPITAL ENCOUNTER (EMERGENCY)
Age: 66
Discharge: HOME | End: 2021-05-24
Attending: EMERGENCY MEDICINE
Payer: MEDICARE

## 2021-05-24 VITALS
HEART RATE: 84 BPM | WEIGHT: 210 LBS | RESPIRATION RATE: 18 BRPM | DIASTOLIC BLOOD PRESSURE: 90 MMHG | HEIGHT: 69 IN | BODY MASS INDEX: 31.1 KG/M2 | TEMPERATURE: 97.1 F | OXYGEN SATURATION: 98 % | SYSTOLIC BLOOD PRESSURE: 166 MMHG

## 2021-05-24 DIAGNOSIS — Z20.822 ENCOUNTER FOR LABORATORY TESTING FOR COVID-19 VIRUS: Primary | ICD-10-CM

## 2021-05-24 LAB — SARS-COV-2, NAAT: NOT DETECTED

## 2021-05-24 PROCEDURE — 99282 EMERGENCY DEPT VISIT SF MDM: CPT

## 2021-05-24 PROCEDURE — 87635 SARS-COV-2 COVID-19 AMP PRB: CPT

## 2021-05-24 RX ORDER — NAPROXEN 500 MG/1
500 TABLET ORAL 2 TIMES DAILY PRN
Qty: 20 TABLET | Refills: 0 | Status: SHIPPED | OUTPATIENT
Start: 2021-05-24

## 2021-05-24 ASSESSMENT — PAIN DESCRIPTION - FREQUENCY: FREQUENCY: INTERMITTENT

## 2021-05-24 ASSESSMENT — PAIN DESCRIPTION - LOCATION: LOCATION: HIP

## 2021-05-24 ASSESSMENT — PAIN SCALES - GENERAL: PAINLEVEL_OUTOF10: 5

## 2021-05-24 ASSESSMENT — PAIN DESCRIPTION - DESCRIPTORS: DESCRIPTORS: ACHING

## 2021-05-24 ASSESSMENT — PAIN DESCRIPTION - ORIENTATION: ORIENTATION: LEFT

## 2021-05-24 NOTE — ED PROVIDER NOTES
Department of Emergency Medicine   ED  Provider Note  Admit Date/RoomTime: 5/24/2021  6:47 AM  ED Room: [place] [id number]          History of Present Illness:  5/24/21, Time: 6:40 AM EDT  Chief Complaint   Patient presents with    Other     Needs covid test for procedure @ [address]; Voices no other complaints @ this time                Nick Ruiz is a 72 y.o. male presenting to the ED for COVID 19 testing prior to elective surgical procedure. .  The complaint has been persistent, mild in severity, and worsened by nothing. Patient reports he is scheduled for a procedure at the [facility] clinic on Wednesday and needs a Covid 19 test prior to the procedure. He denies any other complaints. He says they will not let him into the [facility] clinic without a Covid test. He denies any infectious symptoms. No fevers or chills or cough or shortness of breath. Review of Systems:   A complete review of systems was performed and pertinent positives and negatives are stated within HPI, all other systems reviewed and are negative.        --------------------------------------------- PAST HISTORY ---------------------------------------------  Past Medical History:  has a past medical history of Anxiety, Chronic right-sided low back pain with right-sided sciatica, Elbow pain, and Right hand pain. Past Surgical History:  has a past surgical history that includes shoulder surgery; arthrodesis; Finger surgery; and epidural steroid injection (Right, 4/11/2019). Social History:  reports that he has been smoking cigarettes. He has a 17.50 pack-year smoking history. He has never used smokeless tobacco. He reports previous drug use. Drug: Cocaine. He reports that he does not drink alcohol. Family History: family history includes Heart Disease in his father; Stroke in his mother. . Unless otherwise noted, family history is non contributory    The patients home medications have been reviewed.     Allergies: Penicillins    I have reviewed the past medical history, past surgical history, social history, and family history    ---------------------------------------------------PHYSICAL EXAM--------------------------------------    Constitutional/General: Alert and oriented x3  Head: Normocephalic and atraumatic  Eyes: PERRL, EOMI, sclera non icteric  ENT: Oropharynx clear, handling secretions  Neck: Supple, full ROM, no stridor, no meningeal signs  Respiratory: Lungs clear to auscultation bilaterally  Cardiovascular:  Regular rate. Regular rhythm  Musculoskeletal: Moves all extremities x 4. Skin: skin warm and dry. No rashes. Neurologic: GCS 15  Psychiatric: Normal Affect          -------------------------------------------------- RESULTS -------------------------------------------------  I have personally reviewed all laboratory and imaging results for this patient. Results are listed below. LABS: (Lab results interpreted by me)  Results for orders placed or performed during the hospital encounter of 05/24/21   COVID-19, Rapid   Result Value Ref Range    SARS-CoV-2, NAAT Not Detected Not Detected   ,       RADIOLOGY:  Interpreted by Radiologist unless otherwise specified  No orders to display             ------------------------- NURSING NOTES AND VITALS REVIEWED ---------------------------   The nursing notes within the ED encounter and vital signs as below have been reviewed by myself  BP (!) 166/90   Pulse 84   Temp 97.1 °F (36.2 °C) (Tympanic)   Resp 18   Ht 5' 9\" (1.753 m)   Wt 210 lb (95.3 kg)   SpO2 98%   BMI 31.01 kg/m²     Oxygen Saturation Interpretation: Normal    The patients available past medical records and past encounters were reviewed. ------------------------------ ED COURSE/MEDICAL DECISION MAKING----------------------  Medications - No data to display        IDr. Esdras, am the primary provider of record    Medical Decision Making:   Covid test was ordered as requested by the patient.   He then approached the nurse asking for medication for his chronic left hip pain. Naproxen was ordered. The patient then became upset and told me the only thing that works was Goodman Do.  I discussed with him my concern with prescribing narcotic pain medication and that naproxen or another anti-inflammatory medication would likely be more appropriate. I also reviewed his [facility] automated prescription reporting report and he has had multiple doses of Norco prescribed by multiple providers from the emergency department over the last few weeks to months. I discussed that rules and regulations recommend one provider to follow this long-term. I told him I would not be able to prescribe a another prescription for Norco as he just had this done less than 2 weeks ago prescribed 12 pills at that time. He did become upset swearing at myself and nursing about not receiving Norco and then got up and walked out the door left without his prescription for his naproxen. Counseling: The emergency provider has spoken with the patient and discussed todays results, in addition to providing specific details for the plan of care and counseling regarding the diagnosis and prognosis. Questions are answered at this time and they are agreeable with the plan.       --------------------------------- IMPRESSION AND DISPOSITION ---------------------------------    IMPRESSION  1. Encounter for laboratory testing for COVID-19 virus        DISPOSITION  Disposition: Discharge to home  Patient condition is good        NOTE: This report was transcribed using voice recognition software.  Every effort was made to ensure accuracy; however, inadvertent computerized transcription errors may be present       Mesfin Kinney MD  05/24/21 2783

## 2021-05-25 ENCOUNTER — CARE COORDINATION (OUTPATIENT)
Dept: CARE COORDINATION | Age: 66
End: 2021-05-25

## 2021-05-25 NOTE — CARE COORDINATION
contacted patient for CC follow up on pain management, smoking, RD. Patient states he has not received a call from [facility] yet for pain management. Patient states he has become a little irritated that he has not received a call and wanted to make sure the referral was javier. Informed patient that I do see documented notes from Friday 5/21/21 that show a fax was sent over. Patient stats if he doesn't here from them by tomorrow he is going to call. Patient has tried to cut down on the amount of cigarettes he smokes but admits that during times that he is irritated or stressed that he smokes more. Patient states he did okay over with weekend with just smoking 9 cigarette but on Monday he smoked 12. Patient spoke to [facility] and states he has no additional questions at this time.

## 2021-06-03 ENCOUNTER — CARE COORDINATION (OUTPATIENT)
Dept: CARE COORDINATION | Age: 66
End: 2021-06-03

## 2021-06-03 NOTE — CARE COORDINATION
Yaneli Saldivar  6/3/2021    Registered Dietitian Progress Note for Care Coordination    Assessment: Irene Moran is a 72 y.o. male. RD referred for weight loss tips. RD spoke with patient for initial nutrition assessment on 5/20. RD called to follow up with pt today 6/3. Pt states he received the educational handouts in the mail- found the information helpful. RD discussed previous goals with pt. He is eating 2 meals/day with a balanced snack. He is watching his portion sizes and snacking on nuts more often. He explains he \"sees a difference. \" RD acknowledged the awesome changes patient has made and encouraged him to continue the awesome work. Pt has no nutrition related questions at this time. RD offered to follow up with patient in a few weeks, pt accepted and very appreciative. Barriers to meeting goals: overwhelmed by complexity of regimen    Action:  Reiterated the importance of eating balanced meals and balanced snacks consistently throughout the day. Nutrition Monitoring and Evaluation  Indicator/Goal Criteria Progress   #1 Eat balanced meals consistently throughout the day. #1 Focus on eating 3 meals/day instead of only 2 meals/day. #1 Pt is eating more consistently throughout the day. #2  Incorporate more of a variety at meals. Choose fruits, vegetables, whole grains, lean protein and low fat dairy. #2 Make meals balanced using the MyPlate OVNMOFNVO-6/5 plate fruits and/or vegetables, 1/4 plate protein and 1/4 plate starchy carbohydrates with 8 oz glass of low fat milk if desired. #2 Pt is focusing on incorporating each component the plate. Pt is watching portion sizes. #3  Eat balanced snacks in between meals if hungry. Eat sweets in moderation. #3 Include a serving of protein with a serving of carbohydrates. Limit consumption of sweets. #3 Pt is eating nuts for a snack. Plan of Care:  RD encouraged pt to keep working toward goals set.  RD will follow up with pt to discuss any questions pt has and check the progress toward goals. Follow Up:    RD will call pt in 2 weeks to follow up and answer any nutrition related questions at that time.      [address] [facility], [address] [place] [address]

## 2021-06-09 ENCOUNTER — CARE COORDINATION (OUTPATIENT)
Dept: CARE COORDINATION | Age: 66
End: 2021-06-09

## 2021-06-09 NOTE — CARE COORDINATION
Ambulatory Care Coordination Note  6/9/2021  CM Risk Score: 8  Charlson 10 Year Mortality Risk Score: 10%     ACC: Ila Nair, RN    Summary Note:   ACM contacted patient to follow up on his status. Patient did verify that he has had the COVID vaccine. He received the vaccines at [facility]. Patient is working with the RD to assist in weight management. ACM contacted Dimitri Carroll from pre-service and assisted in scheduling a f/u appointment with his PCP, Jazmine Jean. Patient was on the line and agreed to the scheduled date and time of the appointment. Referral for Advance Care  with patient who is interested in discussing their wishes further. Referral for continued conversation for ACP decision-making/goals of care. ACM did verify patient's healthcare decision makers. ACM verified that patient does not have an email address. GENERAL - PAIN  -See assessment  -Patient did have surgery scheduled at [facility] for 06/11/21. Patient states he is not planning on attending this appointment. He is frustrated that he needs to have a repeat COVID test again and he does not want to have shots in his back. ACM strongly encouraged patient to call the clinic to inform them that he will not be attending this appointment. Patient agreed to do so. -Patient states his pain is in his left lower back. He rates it as a 6-7/10.   -Patient has signed a LISY for [address] [place] [address] to obtain his records from [facility]. ACM contacted [facility] and they did verify that the LISY was sent on 06/02/21. ACM informed patient of this information. Patient was frustrated stating that he does not understand why things take so long in [place]. He states if he was in [facility], he would not have to deal with the issues that he is dealing with to have his pain managed.   -Patient states he does believe that smoking helps his pain. He is smoking approximately 14 cigarettes per day.  Patient is aware of the detrimental effects smoking can have on his overall health. PLAN  -Continue Care Coordination  -F/U to determine if [facility] has accepted to treat patient.  -Refer to ACP per patient request.   -F/U on status of smoking. Care Coordination Interventions    Program Enrollment: Complex Care  Referral from Primary Care Provider: No  Suggested Interventions and Community Resources  Medi Set or Pill Pack: Declined  Physical Therapy: Completed (Comment: 04/12/21: Patient currently active with PT; PT completed on 04/28/21)  Registered Dietician: In Process (Comment: 04/12/21: Referral placed for weight loss)  Smoking Cessation: In Process  Specialty Services Referral: Completed (Comment: 04/12/21: Patient active in Pain Management through [facility]; 05/18/21: Referral placed for Pain Management through [employer] [facility])  Transportation Support: Completed         Goals Addressed                    This Visit's Progress       Patient Stated      Patient Stated (pt-stated)   No change      I will follow with provider to assist with pain management    Barriers: overwhelmed by complexity of regimen  Plan for overcoming my barriers: Care Coordination, F/U on status of pain  Confidence: 7/10  Anticipated Goal Completion Date: 07/12/21         Other      Conditions and Symptoms   On track      I will schedule office visits, as directed by my provider. I will keep my appointment or reschedule if I have to cancel. I will notify my provider of any symptoms that indicate a worsening of my condition.   R/T Pain    Barriers: overwhelmed by complexity of regimen and time constraints  Plan for overcoming my barriers: Care Coordination, Remind patient of upcoming appointments  Confidence: 8/10  Anticipated Goal Completion Date: 07/12/21          Nutrition Plan   On track      I will allow ACM to place a referral to RD for education on weight loss strategies    Barriers: overwhelmed by complexity of regimen and lack of education  Plan for overcoming my barriers: Care Coordination, Refer to RD  Confidence: 7/10  Anticipated Goal Completion Date: 07/12/21        Wellness Goal   Worsening      Patient Self-Management Goal for Health Maintenance  Goal: I will chose a goal related to tobacco cessation:  I will think about my triggers for smoking, I will think about reasons why I should quit smoking, I will eliminate tobacco products from my home and work environments, and I will avoid triggers and negative influences. Barriers: overwhelmed by complexity of regimen and stress  Plan for overcoming my barriers: Care Coordination, Educate on the benefits of Smoking Cessation  Confidence: 6/10  Anticipated Goal Completion Date: 07/12/21            Prior to Admission medications    Medication Sig Start Date End Date Taking? Authorizing Provider   naproxen (NAPROSYN) 500 MG tablet Take 1 tablet by mouth 2 times daily as needed for Pain 5/24/21   Zane King MD   fluticasone [facility]) 50 MCG/ACT nasal spray 1 spray by Each Nostril route daily 1/29/21   Len Galicia PA-C   atorvastatin (LIPITOR) 10 MG tablet Take 1 tablet by mouth daily 6/26/20   Crystal Jauregui DO   aspirin 81 MG chewable tablet Take 81 mg by mouth daily    Historical Provider, MD   diclofenac sodium (VOLTAREN) 1 % GEL Apply 2 g topically 4 times daily 4/7/20   Kristen Elizabeth APRN - CNP   Multiple Vitamins-Minerals (CENTRUM PO) Take by mouth     Historical Provider, MD   Aspirin-Acetaminophen-Caffeine (EXCEDRIN PO) Take by mouth as needed Q8 hrs.      Historical Provider, MD       Future Appointments   Date Time Provider Alise Guzman   6/11/2021  1:30 PM LAINA Mcdaniels [facility]      and   General Assessment    Do you have any symptoms that are causing concern?: Yes  Progression since Onset: Unchanged  Reported Symptoms: Pain (Comment: 06/09/21: continues with pain in left lower back region)

## 2021-06-10 ENCOUNTER — CARE COORDINATION (OUTPATIENT)
Dept: CARE COORDINATION | Age: 66
End: 2021-06-10

## 2021-06-10 NOTE — CARE COORDINATION
Patient contacted this ACM to state that he did cancel his appointment with [facility]. He also stated that his sister will be transporting him to his PCP appointment on 06/11/21. PLAN  -Continue Care Coordination  -F/U to determine status of pain and any additional recommendation from PCP  -F/U on status of smoking.

## 2021-06-11 ENCOUNTER — OFFICE VISIT (OUTPATIENT)
Dept: FAMILY MEDICINE CLINIC | Age: 66
End: 2021-06-11
Payer: MEDICARE

## 2021-06-11 ENCOUNTER — TELEPHONE (OUTPATIENT)
Dept: CARE COORDINATION | Age: 66
End: 2021-06-11

## 2021-06-11 VITALS
TEMPERATURE: 97 F | DIASTOLIC BLOOD PRESSURE: 80 MMHG | SYSTOLIC BLOOD PRESSURE: 120 MMHG | HEART RATE: 82 BPM | HEIGHT: 69 IN | BODY MASS INDEX: 31.99 KG/M2 | WEIGHT: 216 LBS | RESPIRATION RATE: 16 BRPM

## 2021-06-11 DIAGNOSIS — G89.4 CHRONIC PAIN SYNDROME: ICD-10-CM

## 2021-06-11 DIAGNOSIS — G89.29 CHRONIC PAIN OF RIGHT KNEE: ICD-10-CM

## 2021-06-11 DIAGNOSIS — M54.16 LUMBAR RADICULOPATHY: ICD-10-CM

## 2021-06-11 DIAGNOSIS — J30.2 SEASONAL ALLERGIES: Primary | ICD-10-CM

## 2021-06-11 DIAGNOSIS — M25.561 CHRONIC PAIN OF RIGHT KNEE: ICD-10-CM

## 2021-06-11 PROCEDURE — 99214 OFFICE O/P EST MOD 30 MIN: CPT | Performed by: PHYSICIAN ASSISTANT

## 2021-06-11 PROCEDURE — 96372 THER/PROPH/DIAG INJ SC/IM: CPT | Performed by: PHYSICIAN ASSISTANT

## 2021-06-11 RX ORDER — KETOROLAC TROMETHAMINE 10 MG/1
10 TABLET, FILM COATED ORAL EVERY 6 HOURS PRN
Qty: 20 TABLET | Refills: 0 | Status: SHIPPED | OUTPATIENT
Start: 2021-06-11 | End: 2022-06-11

## 2021-06-11 RX ORDER — FLUTICASONE PROPIONATE 50 MCG
1 SPRAY, SUSPENSION (ML) NASAL DAILY
Qty: 1 BOTTLE | Refills: 5 | Status: SHIPPED
Start: 2021-06-11 | End: 2021-06-11 | Stop reason: SDUPTHER

## 2021-06-11 RX ORDER — FLUTICASONE PROPIONATE 50 MCG
1 SPRAY, SUSPENSION (ML) NASAL DAILY
Qty: 1 BOTTLE | Refills: 5 | Status: SHIPPED | OUTPATIENT
Start: 2021-06-11

## 2021-06-11 RX ORDER — DEXAMETHASONE SODIUM PHOSPHATE 4 MG/ML
4 INJECTION, SOLUTION INTRA-ARTICULAR; INTRALESIONAL; INTRAMUSCULAR; INTRAVENOUS; SOFT TISSUE ONCE
Status: COMPLETED | OUTPATIENT
Start: 2021-06-11 | End: 2021-06-11

## 2021-06-11 RX ADMIN — DEXAMETHASONE SODIUM PHOSPHATE 4 MG: 4 INJECTION, SOLUTION INTRA-ARTICULAR; INTRALESIONAL; INTRAMUSCULAR; INTRAVENOUS; SOFT TISSUE at 14:26

## 2021-06-11 NOTE — TELEPHONE ENCOUNTER
ACP Specialist received a referral from the [facility] to contact Luis Carlos regarding AD's and continued conversation on goals of care. ACP Specialist made 1st outreach attempt and only reached VM. VM was left with call back number. ACP Specialist will follow up with a 2nd outreach attempt. * Note that [facility] stated in her charting that Luis Carlos does not have e-mail. Will have ACP documents/information mailed to Luis Carlos once contact has been made and verification that he wants them.

## 2021-06-11 NOTE — PROGRESS NOTES
CC:  \"no one loves me, they pissing me off, [facility]. \"   HPI: patient is here for chronic pain. Again, he is complaining about all the specialists that he has seen and their lack of willingness to send in opiate pain meds. He says he is asking for them to just send in the meds until he comes up for the procedure and \"he can kiss my ass, excuse my [place]. \" complaining about covid testing prior to going there. He complains that locally, he had a problem with \"Malpractice shooting me in the back and not knowing what she was doing\"   \"No one wants to give me any medication. im the one in the pain. \"  He says \"it becomes a game. \" discussing how he has to go to many places for testing. \"   \"the practice here is backwards. \" he is referring to [facility] ([facility] Discussed this is not a mercy problem, but this is the laws created by the Medical Board. He asks me specifically if \"there is anything you can do. \" I explained again that I can not do chronic opiates for him, especially being that the pain specialist determined it is not appropriate. He asks for \"pain shots. \" the toradol I gave him before helped \"somewhat. \"  He also complains of seasonal allergies. Controlled Substance Monitoring:    Acute and Chronic Pain Monitoring:   RX Monitoring 6/11/2021   Attestation -   Acute Pain Prescriptions -   Periodic Controlled Substance Monitoring Possible medication side effects, risk of tolerance/dependence & alternative treatments discussed. Chronic Pain > 80 MEDD Consulted with a specialist.         Patient's past medical, surgical, social and/or family history reviewed, updated in chart, and are non-contributory (unless otherwise stated). Medications and allergies also reviewed and updated in chart.       Review of Systems:  Constitutional:  No fever, no fatigue, no chills, no headaches, no weight change  Dermatology:  No rash, no mole, no dry or sensitive skin  ENT:  No cough, no sore throat, no sinus pain, + runny nose, no ear pain  Cardiology:  No chest pain, no palpitations, no leg edema, no shortness of breath, no PND  Gastroenterology:  No dysphagia, no abdominal pain, no nausea, no vomiting, no constipation, no diarrhea, no heartburn  Musculoskeletal:  No joint pain, no leg cramps,+ back pain, no muscle aches  Respiratory:  No shortness of breath, no orthopnea, no wheezing, no HAMMOND, no hemoptysis  Urology:  No blood in the urine, no urinary frequency, no urinary incontinence, no urinary urgency, no nocturia, no dysuria    Vitals:    06/11/21 1349   BP: 120/80   Pulse: 82   Resp: 16   Temp: 97 °F (36.1 °C)   TempSrc: Temporal   Weight: 216 lb (98 kg)   Height: 5' 9\" (1.753 m)       Physical Exam  Constitutional:       General: He is not in acute distress. Appearance: He is well-developed. He is obese. HENT:      Head: Normocephalic and atraumatic. Right Ear: External ear normal.      Left Ear: External ear normal.      Nose: Nose normal.   Eyes:      General: No scleral icterus. Conjunctiva/sclera: Conjunctivae normal.      Pupils: Pupils are equal, round, and reactive to light. Neck:      Thyroid: No thyromegaly. Cardiovascular:      Rate and Rhythm: Normal rate and regular rhythm. Heart sounds: Normal heart sounds. No murmur heard. Pulmonary:      Effort: Pulmonary effort is normal. No accessory muscle usage or respiratory distress. Breath sounds: Normal breath sounds. No wheezing. Musculoskeletal:         General: Normal range of motion. Cervical back: Normal range of motion and neck supple. Skin:     General: Skin is warm and dry. Findings: No rash. Neurological:      Mental Status: He is alert and oriented to person, place, and time. Deep Tendon Reflexes: Reflexes are normal and symmetric. Psychiatric:         Speech: Speech normal.         Behavior: Behavior normal.         Assessment/Plan:      Luis Carlos was seen today for pain.     Diagnoses and all orders for this visit:    Seasonal allergies  -     fluticasone (FLONASE) 50 MCG/ACT nasal spray; 1 spray by Each Nostril route daily  Chronic pain syndrome  -     ketorolac (TORADOL) 10 MG tablet; Take 1 tablet by mouth every 6 hours as needed for Pain    Chronic pain of right knee  -     ketorolac (TORADOL) 10 MG tablet; Take 1 tablet by mouth every 6 hours as needed for Pain    Lumbar radiculopathy  -     ketorolac (TORADOL) 10 MG tablet; Take 1 tablet by mouth every 6 hours as needed for Pain  -     dexamethasone (DECADRON) injection 4 mg        As above. Call or go to ED immediately if symptoms worsen or persist.  Return if symptoms worsen or fail to improve. , or sooner if necessary. Educational materials and/or home exercises printed for patient's review and were included in patient instructions on his/her After Visit Summary and given to patient at the end of visit. Counseled regarding above diagnosis, including possible risks and complications,  especially if left uncontrolled. Counseled regarding the possible side effects, risks, benefits and alternatives to treatment; patient and/or guardian verbalizes understanding, agrees, feels comfortable with and wishes to proceed with above treatment plan. Advised patient to call with any new medication issues, and read all Rx info from pharmacy to assure aware of all possible risks and side effects of medication before taking. Reviewed age and gender appropriate health screening exams and vaccinations. Advised patient regarding importance of keeping up with recommended health maintenance and to schedule as soon as possible if overdue, as this is important in assessing for undiagnosed pathology, especially cancer, as well as protecting against potentially harmful/life threatening disease. Patient and/or guardian verbalizes understanding and agrees with above counseling, assessment and plan. All questions answered.     Angel Bee PA-C  6/11/2021    I

## 2021-06-17 ENCOUNTER — TELEPHONE (OUTPATIENT)
Dept: CARE COORDINATION | Age: 66
End: 2021-06-17

## 2021-06-17 ENCOUNTER — CARE COORDINATION (OUTPATIENT)
Dept: CARE COORDINATION | Age: 66
End: 2021-06-17

## 2021-06-17 NOTE — TELEPHONE ENCOUNTER
This ACP Specialist received a referral from the [facility] on Luis Carlos  Referral was for AD's and continued conversation on goals of care. ACP Specialist spoke with  Luis Carlos and they are aware and agreeable to this referral.        ACP Specialist reviewed HCD fields and ensured it was up to date in [address] ACP Specialist reviewed AD's with Luis Carlos, information sent out via mail. Luis Carlos does not have email access to complete them via [employer]. Attempted to set up a call back date with Luis Carlos to review the documents and assist via phone. Luis Carlos states he can't set a time because, \"I might be gone\". He couldn't verify that he would be home at all for the rest of June/July. It was agreed upon that Luis Carlos would receive the information via mail and then reach out to this ACP Specialist once he has time and if he has questions. If no call is received by the end of July Luis Carlos's case will be closed. Luis Carlos verbalized understanding and agreement.

## 2021-06-17 NOTE — CARE COORDINATION
Edison Martin  6/17/2021    Registered Dietitian Progress Note for Care Coordination    Assessment: Isabel King is a 72 y.o. male. RD referred for weight loss tips. RD spoke with patient for initial nutrition assessment on 5/20 and first follow up on 6/3. RD called for final follow up with pt today 6/17. RD discussed previous goals with pt. He is eating balanced meals and balanced snacks consistently throughout the day. He is focusing on incorporating each component of the plate. RD encouraged patient to continue working towards goals. Pt has no nutrition related questions at this time. Barriers to meeting goals: overwhelmed by complexity of regimen    Action:  Reiterated the importance of eating balanced meals and balanced snacks consistently throughout the day. Reviewed the components of a balanced meal.     Nutrition Monitoring and Evaluation  Indicator/Goal Criteria Progress   #1 Eat balanced meals consistently throughout the day. #1 Focus on eating 3 meals/day instead of only 2 meals/day.  #1 Pt is eating more consistently throughout the day. #2  Incorporate more of a variety at meals. Choose fruits, vegetables, whole grains, lean protein and low fat dairy.  #2 Make meals balanced using the MyPlate PPCDZSASH-4/5 plate fruits and/or vegetables, 1/4 plate protein and 1/4 plate starchy carbohydrates with 8 oz glass of low fat milk if desired. #2 Pt is focusing on incorporating each component the plate. Pt is watching portion sizes. #3  Eat balanced snacks in between meals if hungry. Eat sweets in moderation. #3 Include a serving of protein with a serving of carbohydrates. Limit consumption of sweets.  #3 Pt is eating balanced snacks.            Plan of Care:  RD encouraged pt to keep working toward goals set. RD explained to pt this is final follow up call and provided contact information to pt. Encouraged pt to call RD in future with any nutrition related questions or concerns.     Follow Up:    Final follow up call today 6/17/21. RD will continue to follow/assist with patient return call.         [address] [facility], [address] [place] [address]

## 2021-06-22 ENCOUNTER — TELEPHONE (OUTPATIENT)
Dept: ORTHOPEDIC SURGERY | Age: 66
End: 2021-06-22

## 2021-06-22 NOTE — TELEPHONE ENCOUNTER
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 Patient stated that he was going to [place] for treatment and does not need a referral from our office at this time. Instructed patient to call our office with any future questions or concerns. No future appointments.

## 2021-06-22 NOTE — TELEPHONE ENCOUNTER
Received call from Therese Ley at Dr. Candie Fitzgerald office regarding the referral placed on 5/18/2021. She stated that the patient was previously seen by Dr. Danny Lance while in the hospital and that Dr. Danny Lance does not treat the condition below and recommended patient be seen at [facility] for any treatment related to this condition. See note below:    Galina Martinez MD   Physician   Neurosurgery   Progress Notes   Signed   Date of Service:  11/13/2020 11:33 AM               Signed             Show:Clear all  [x]Manual[x]Template[]Copied    Added by:  Jonny Cutler MD    []Fortunato for details  Neurosurgery Attending     Discussed with Dr. 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 If pain can be controlled, he can be discharged home and [facility].   If pain cannot be controlled he will need to be transferred to [facility] for evaluation as I do not manage these lesions.     Dondra Mcburney T Ugokwe

## 2021-06-28 ENCOUNTER — CARE COORDINATION (OUTPATIENT)
Dept: CARE COORDINATION | Age: 66
End: 2021-06-28

## 2025-02-28 NOTE — PROGRESS NOTES
[address] [facility]                Phone: [phone]   Fax: [phone]      Physical Therapy  Progress Summary       Date:  2021    Patient Name:  Iva Diaz    :  1955  MRN: [id number]      DIAGNOSIS:  R RTC repair, SAD, bicep tenodesis (10/22/20)  REFERRING PHYSICIAN:  Dr. Hermilo Mchugh:  Mahin Alcantara, DEVONTE       ATTENDANCE:  Patient has attended 15 of 20 scheduled treatments from  20  to 21. TREATMENTS RECEIVED:  Therapeutic exercise, PROM/AROM, stretching, strengthening, functional activities, postural awareness/positioning training, HEP, modalities. INITIAL STATUS:  · c/o constant/aching pain R shoulder 9/10  · Observation: moderate swelling observed across ant/post poles of shoulder with stitches still intact  · AROM  R shoulder:  flex/abd= 120*/100*, IR/ER= 45*/40*  · Strength  grossly 2-, 2/5 for all ranges R shoulder/RTC; grossly 4/5 across R elbow  · Endurance for all prolonged activities is FAIR      CURRENT / /FINAL STATUS:  · c/o  pain R shoulder 0-4/10  · Standing AROM flexion to 140°  abduction to 140° c/o stiffness but no c/o pain at end range elevation  · AROM ER reach T2-3  IR reach to T11-12  · Strength R shoulder grossly  4- to 4/5   · Endurance for all prolonged activities is GOOD-/GOOD  · Independent with HEP      GOALS:  5 out of 5 Long Term Goals were obtained. LONG TERM GOALS OBTAINED/NOT OBTAINED:   goal 1: Decrease pain across R shoulder with all prolonged activities, 0-4/10  ACHIEVED  goal 2: Increase AROM R shoulder:  flex/abd= 140° /140°, IR/ER= L4/back of head  ACHIEVED  goal 3: Increase strength across R shoulder to grossly 4- to 4/5 for all ranges  ACHIEVED  goal 4: Improve endurance for all prolonged activities to GOOD-/GOOD  ACHIEVED  goal 5: Assure I with HEP for home management of condition  ACHIEVED      PATIENT EDUCATION/INSTRUCTIONS:  Patient instructed on and provided copy of HEP.             [facility] Coordination  RPM Note     2/28/2025 8:54 AM     Patient Current Location:  Home: [place] [address]    CCSS received request from [facility] Nelly Young  to assist patient in contacting insurance company for verification of remote patient monitoring coverage.  If patient is agreeable to information provided by insurance company, patient will be enrolled in remote patient monitoring for CHF; condition managed by PCP. HTN; condition managed by PCP.        Insurance verification complete.  If provider is in EPO and depends how services are billed (office or home care), patient would be responsible for $50 copay (office) or 20% of cost (home care).  Will notify [facility] at this time.     Reference #: [id number]   you for the opportunity to work with your patient. If you have questions or comments, please feel free to contact me by phone or fax. Electronically Signed by: Rodney Burt ATC, PTA [id number]   2/5/2021     I have read the above summary and agree with all the content. Patient is discharged from PT care at this time.    Papo Regulus, PT

## 2025-08-17 VITALS
SYSTOLIC BLOOD PRESSURE: 125 MMHG | OXYGEN SATURATION: 97 % | HEART RATE: 80 BPM | RESPIRATION RATE: 19 BRPM | DIASTOLIC BLOOD PRESSURE: 73 MMHG | TEMPERATURE: 98.2 F

## 2025-08-17 PROCEDURE — 83735 ASSAY OF MAGNESIUM: CPT

## 2025-08-17 PROCEDURE — 80053 COMPREHEN METABOLIC PANEL: CPT

## 2025-08-17 PROCEDURE — 85025 COMPLETE CBC W/AUTO DIFF WBC: CPT

## 2025-08-17 PROCEDURE — 84484 ASSAY OF TROPONIN QUANT: CPT

## 2025-08-17 PROCEDURE — 99285 EMERGENCY DEPT VISIT HI MDM: CPT

## 2025-08-17 PROCEDURE — 87636 SARSCOV2 & INF A&B AMP PRB: CPT

## 2025-08-17 PROCEDURE — 87086 URINE CULTURE/COLONY COUNT: CPT

## 2025-08-17 PROCEDURE — 83605 ASSAY OF LACTIC ACID: CPT

## 2025-08-17 PROCEDURE — 81001 URINALYSIS AUTO W/SCOPE: CPT

## 2025-08-17 ASSESSMENT — LIFESTYLE VARIABLES: HOW OFTEN DO YOU HAVE A DRINK CONTAINING ALCOHOL: NEVER

## 2025-08-18 ENCOUNTER — APPOINTMENT (OUTPATIENT)
Dept: GENERAL RADIOLOGY | Age: 70
End: 2025-08-18
Payer: MEDICAID

## 2025-08-18 ENCOUNTER — HOSPITAL ENCOUNTER (EMERGENCY)
Age: 70
Discharge: HOME | End: 2025-08-18
Attending: STUDENT IN AN ORGANIZED HEALTH CARE EDUCATION/TRAINING PROGRAM
Payer: MEDICAID

## 2025-08-18 DIAGNOSIS — R53.83 FATIGUE, UNSPECIFIED TYPE: Primary | ICD-10-CM

## 2025-08-18 LAB
ALBUMIN SERPL-MCNC: 3.6 G/DL (ref 3.5–5.2)
ALP SERPL-CCNC: 82 U/L (ref 40–129)
ALT SERPL-CCNC: 18 U/L (ref 0–50)
ANION GAP SERPL CALCULATED.3IONS-SCNC: 14 MMOL/L (ref 7–16)
AST SERPL-CCNC: 23 U/L (ref 0–50)
BACTERIA URNS QL MICRO: ABNORMAL
BASOPHILS # BLD: 0.03 K/UL (ref 0–0.2)
BASOPHILS NFR BLD: 1 % (ref 0–2)
BILIRUB SERPL-MCNC: 0.3 MG/DL (ref 0–1.2)
BILIRUB UR QL STRIP: NEGATIVE
BUN SERPL-MCNC: 16 MG/DL (ref 8–23)
CALCIUM SERPL-MCNC: 8.8 MG/DL (ref 8.8–10.2)
CASTS #/AREA URNS LPF: ABNORMAL /LPF
CHLORIDE SERPL-SCNC: 106 MMOL/L (ref 98–107)
CLARITY UR: CLEAR
CO2 SERPL-SCNC: 21 MMOL/L (ref 22–29)
COLOR UR: YELLOW
CREAT SERPL-MCNC: 0.9 MG/DL (ref 0.7–1.2)
EOSINOPHIL # BLD: 0.2 K/UL (ref 0.05–0.5)
EOSINOPHILS RELATIVE PERCENT: 4 % (ref 0–6)
ERYTHROCYTE [DISTWIDTH] IN BLOOD BY AUTOMATED COUNT: 13.7 % (ref 11.5–15)
FLUAV RNA RESP QL NAA+PROBE: NOT DETECTED
FLUBV RNA RESP QL NAA+PROBE: NOT DETECTED
GFR, ESTIMATED: >90 ML/MIN/1.73M2
GLUCOSE SERPL-MCNC: 148 MG/DL (ref 74–99)
GLUCOSE UR STRIP-MCNC: NEGATIVE MG/DL
HCT VFR BLD AUTO: 43.8 % (ref 37–54)
HGB BLD-MCNC: 13.9 G/DL (ref 12.5–16.5)
HGB UR QL STRIP.AUTO: NEGATIVE
IMM GRANULOCYTES # BLD AUTO: <0.03 K/UL (ref 0–0.58)
IMM GRANULOCYTES NFR BLD: 0 % (ref 0–5)
KETONES UR STRIP-MCNC: ABNORMAL MG/DL
LACTATE BLDV-SCNC: 2 MMOL/L (ref 0.5–2.2)
LEUKOCYTE ESTERASE UR QL STRIP: NEGATIVE
LYMPHOCYTES NFR BLD: 1.54 K/UL (ref 1.5–4)
LYMPHOCYTES RELATIVE PERCENT: 32 % (ref 20–42)
MAGNESIUM SERPL-MCNC: 1.9 MG/DL (ref 1.6–2.4)
MCH RBC QN AUTO: 29.3 PG (ref 26–35)
MCHC RBC AUTO-ENTMCNC: 31.7 G/DL (ref 32–34.5)
MCV RBC AUTO: 92.4 FL (ref 80–99.9)
MONOCYTES NFR BLD: 0.55 K/UL (ref 0.1–0.95)
MONOCYTES NFR BLD: 11 % (ref 2–12)
NEUTROPHILS NFR BLD: 52 % (ref 43–80)
NEUTS SEG NFR BLD: 2.48 K/UL (ref 1.8–7.3)
NITRITE UR QL STRIP: NEGATIVE
PH UR STRIP: 6 [PH] (ref 5–8)
PLATELET # BLD AUTO: 298 K/UL (ref 130–450)
PMV BLD AUTO: 10.3 FL (ref 7–12)
POTASSIUM SERPL-SCNC: 3.9 MMOL/L (ref 3.5–5.1)
PROT SERPL-MCNC: 6.6 G/DL (ref 6.4–8.3)
PROT UR STRIP-MCNC: ABNORMAL MG/DL
RBC # BLD AUTO: 4.74 M/UL (ref 3.8–5.8)
RBC #/AREA URNS HPF: ABNORMAL /HPF
SARS-COV-2 RNA RESP QL NAA+PROBE: NOT DETECTED
SODIUM SERPL-SCNC: 140 MMOL/L (ref 136–145)
SOURCE: NORMAL
SP GR UR STRIP: >1.03 (ref 1–1.03)
SPECIMEN DESCRIPTION: NORMAL
TROPONIN I SERPL HS-MCNC: 12 NG/L (ref 0–22)
TROPONIN I SERPL HS-MCNC: 15 NG/L (ref 0–22)
UROBILINOGEN UR STRIP-ACNC: 1 EU/DL (ref 0–1)
WBC #/AREA URNS HPF: ABNORMAL /HPF
WBC OTHER # BLD: 4.8 K/UL (ref 4.5–11.5)

## 2025-08-18 PROCEDURE — 84484 ASSAY OF TROPONIN QUANT: CPT

## 2025-08-18 PROCEDURE — 71045 X-RAY EXAM CHEST 1 VIEW: CPT

## 2025-08-19 LAB
MICROORGANISM SPEC CULT: ABNORMAL
MICROORGANISM SPEC CULT: ABNORMAL
SPECIMEN DESCRIPTION: ABNORMAL

## 2025-08-21 LAB
EKG ATRIAL RATE: 77 BPM
EKG P AXIS: 67 DEGREES
EKG P-R INTERVAL: 184 MS
EKG Q-T INTERVAL: 396 MS
EKG QRS DURATION: 82 MS
EKG QTC CALCULATION (BAZETT): 448 MS
EKG R AXIS: -9 DEGREES
EKG T AXIS: 20 DEGREES
EKG VENTRICULAR RATE: 77 BPM

## 2025-09-04 ENCOUNTER — HOSPITAL ENCOUNTER (EMERGENCY)
Age: 70
Discharge: HOME | End: 2025-09-04
Attending: EMERGENCY MEDICINE
Payer: MEDICAID

## 2025-09-04 VITALS
HEIGHT: 69 IN | DIASTOLIC BLOOD PRESSURE: 76 MMHG | TEMPERATURE: 97.8 F | OXYGEN SATURATION: 97 % | WEIGHT: 216 LBS | RESPIRATION RATE: 18 BRPM | BODY MASS INDEX: 31.99 KG/M2 | HEART RATE: 90 BPM | SYSTOLIC BLOOD PRESSURE: 132 MMHG

## 2025-09-04 DIAGNOSIS — G89.29 CHRONIC RIGHT-SIDED LOW BACK PAIN WITHOUT SCIATICA: ICD-10-CM

## 2025-09-04 DIAGNOSIS — M54.50 CHRONIC RIGHT-SIDED LOW BACK PAIN WITHOUT SCIATICA: ICD-10-CM

## 2025-09-04 DIAGNOSIS — G89.29 CHRONIC RIGHT SHOULDER PAIN: Primary | ICD-10-CM

## 2025-09-04 DIAGNOSIS — M25.511 CHRONIC RIGHT SHOULDER PAIN: Primary | ICD-10-CM

## 2025-09-04 PROCEDURE — 6370000000 HC RX 637 (ALT 250 FOR IP): Performed by: EMERGENCY MEDICINE

## 2025-09-04 RX ORDER — OXYCODONE AND ACETAMINOPHEN 5; 325 MG/1; MG/1
2 TABLET ORAL ONCE
Refills: 0 | Status: COMPLETED | OUTPATIENT
Start: 2025-09-04 | End: 2025-09-04

## 2025-09-04 RX ORDER — NAPROXEN 500 MG/1
500 TABLET ORAL 2 TIMES DAILY PRN
Qty: 60 TABLET | Refills: 0 | Status: SHIPPED | OUTPATIENT
Start: 2025-09-04

## 2025-09-04 RX ORDER — TRAMADOL HYDROCHLORIDE 50 MG/1
50 TABLET ORAL EVERY 8 HOURS PRN
Qty: 9 TABLET | Refills: 0 | Status: SHIPPED | OUTPATIENT
Start: 2025-09-04 | End: 2025-09-07

## 2025-09-04 RX ORDER — LIDOCAINE 50 MG/G
1 PATCH TOPICAL DAILY
Qty: 10 PATCH | Refills: 0 | Status: SHIPPED | OUTPATIENT
Start: 2025-09-04 | End: 2025-09-14

## 2025-09-04 RX ADMIN — OXYCODONE AND ACETAMINOPHEN 2 TABLET: 5; 325 TABLET ORAL at 11:06

## 2025-09-04 ASSESSMENT — PAIN DESCRIPTION - DESCRIPTORS: DESCRIPTORS: SORE

## 2025-09-04 ASSESSMENT — PAIN DESCRIPTION - ONSET: ONSET: ON-GOING

## 2025-09-04 ASSESSMENT — PAIN DESCRIPTION - ORIENTATION
ORIENTATION: RIGHT
ORIENTATION: RIGHT

## 2025-09-04 ASSESSMENT — LIFESTYLE VARIABLES: HOW MANY STANDARD DRINKS CONTAINING ALCOHOL DO YOU HAVE ON A TYPICAL DAY: PATIENT DOES NOT DRINK

## 2025-09-04 ASSESSMENT — PAIN - FUNCTIONAL ASSESSMENT
PAIN_FUNCTIONAL_ASSESSMENT: 0-10
PAIN_FUNCTIONAL_ASSESSMENT: 0-10

## 2025-09-04 ASSESSMENT — PAIN SCALES - GENERAL
PAINLEVEL_OUTOF10: 8
PAINLEVEL_OUTOF10: 8

## 2025-09-04 ASSESSMENT — PAIN DESCRIPTION - LOCATION
LOCATION: SHOULDER
LOCATION: SHOULDER;HIP

## 2025-09-04 ASSESSMENT — PAIN DESCRIPTION - FREQUENCY: FREQUENCY: INTERMITTENT

## 2025-09-04 ASSESSMENT — PAIN DESCRIPTION - PAIN TYPE: TYPE: ACUTE PAIN

## (undated) DEVICE — 3M™ RED DOT™ MONITORING ELECTRODE WITH FOAM TAPE AND STICKY GEL 2560, 50/BAG, 20/CASE, 72/PLT: Brand: RED DOT™

## (undated) DEVICE — NEEDLE HYPO 25GA L1.5IN BLU POLYPR HUB S STL REG BVL STR

## (undated) DEVICE — Z DISCONTINUED APPLICATOR SURG PREP 0.35OZ 2% CHG 70% ISO ALC W/ HI LT

## (undated) DEVICE — BANDAGE ADH W1XL3IN NAT FAB WVN FLX DURABLE N ADH PD SEAL

## (undated) DEVICE — 6 ML SYRINGE LUER-LOCK TIP: Brand: MONOJECT

## (undated) DEVICE — 12 ML SYRINGE,LUER-LOCK TIP: Brand: MONOJECT

## (undated) DEVICE — NEEDLE HYPO 18GA L1.5IN PNK POLYPR HUB S STL THN WALL FILL